# Patient Record
Sex: MALE | Race: WHITE | NOT HISPANIC OR LATINO | URBAN - METROPOLITAN AREA
[De-identification: names, ages, dates, MRNs, and addresses within clinical notes are randomized per-mention and may not be internally consistent; named-entity substitution may affect disease eponyms.]

---

## 2018-05-30 ENCOUNTER — EMERGENCY (EMERGENCY)
Facility: HOSPITAL | Age: 63
LOS: 1 days | Discharge: HOME | End: 2018-05-30
Attending: EMERGENCY MEDICINE | Admitting: EMERGENCY MEDICINE
Payer: COMMERCIAL

## 2018-05-30 VITALS
SYSTOLIC BLOOD PRESSURE: 148 MMHG | HEART RATE: 69 BPM | OXYGEN SATURATION: 96 % | WEIGHT: 229.94 LBS | RESPIRATION RATE: 16 BRPM | HEIGHT: 69 IN | DIASTOLIC BLOOD PRESSURE: 89 MMHG

## 2018-05-30 DIAGNOSIS — Z96.649 PRESENCE OF UNSPECIFIED ARTIFICIAL HIP JOINT: Chronic | ICD-10-CM

## 2018-05-30 DIAGNOSIS — Z98.890 OTHER SPECIFIED POSTPROCEDURAL STATES: Chronic | ICD-10-CM

## 2018-05-30 DIAGNOSIS — Z90.49 ACQUIRED ABSENCE OF OTHER SPECIFIED PARTS OF DIGESTIVE TRACT: Chronic | ICD-10-CM

## 2018-05-30 LAB
ANION GAP SERPL CALC-SCNC: 7 MMOL/L — SIGNIFICANT CHANGE UP (ref 5–17)
APPEARANCE UR: CLEAR — SIGNIFICANT CHANGE UP
BASOPHILS # BLD AUTO: 0.1 K/UL — SIGNIFICANT CHANGE UP (ref 0–0.2)
BASOPHILS NFR BLD AUTO: 0.8 % — SIGNIFICANT CHANGE UP (ref 0–2)
BILIRUB UR-MCNC: NEGATIVE — SIGNIFICANT CHANGE UP
BUN SERPL-MCNC: 23 MG/DL — SIGNIFICANT CHANGE UP (ref 7–23)
CALCIUM SERPL-MCNC: 9.4 MG/DL — SIGNIFICANT CHANGE UP (ref 8.4–10.5)
CHLORIDE SERPL-SCNC: 99 MMOL/L — SIGNIFICANT CHANGE UP (ref 96–108)
CO2 SERPL-SCNC: 26 MMOL/L — SIGNIFICANT CHANGE UP (ref 22–31)
COLOR SPEC: YELLOW — SIGNIFICANT CHANGE UP
CREAT SERPL-MCNC: 0.84 MG/DL — SIGNIFICANT CHANGE UP (ref 0.5–1.3)
DIFF PNL FLD: ABNORMAL
EOSINOPHIL # BLD AUTO: 0 K/UL — SIGNIFICANT CHANGE UP (ref 0–0.5)
EOSINOPHIL NFR BLD AUTO: 0.2 % — SIGNIFICANT CHANGE UP (ref 0–6)
GLUCOSE SERPL-MCNC: 137 MG/DL — HIGH (ref 70–99)
GLUCOSE UR QL: NEGATIVE MG/DL — SIGNIFICANT CHANGE UP
HCT VFR BLD CALC: 43.8 % — SIGNIFICANT CHANGE UP (ref 39–50)
HGB BLD-MCNC: 15.1 G/DL — SIGNIFICANT CHANGE UP (ref 13–17)
KETONES UR-MCNC: NEGATIVE — SIGNIFICANT CHANGE UP
LEUKOCYTE ESTERASE UR-ACNC: NEGATIVE — SIGNIFICANT CHANGE UP
LYMPHOCYTES # BLD AUTO: 1.6 K/UL — SIGNIFICANT CHANGE UP (ref 1–3.3)
LYMPHOCYTES # BLD AUTO: 10.4 % — LOW (ref 13–44)
MCHC RBC-ENTMCNC: 30.7 PG — SIGNIFICANT CHANGE UP (ref 27–34)
MCHC RBC-ENTMCNC: 34.6 GM/DL — SIGNIFICANT CHANGE UP (ref 32–36)
MCV RBC AUTO: 88.8 FL — SIGNIFICANT CHANGE UP (ref 80–100)
MONOCYTES # BLD AUTO: 0.8 K/UL — SIGNIFICANT CHANGE UP (ref 0–0.9)
MONOCYTES NFR BLD AUTO: 4.9 % — SIGNIFICANT CHANGE UP (ref 2–14)
NEUTROPHILS # BLD AUTO: 13.1 K/UL — HIGH (ref 1.8–7.4)
NEUTROPHILS NFR BLD AUTO: 83.7 % — HIGH (ref 43–77)
NITRITE UR-MCNC: NEGATIVE — SIGNIFICANT CHANGE UP
PH UR: 7 — SIGNIFICANT CHANGE UP (ref 5–8)
PLATELET # BLD AUTO: 299 K/UL — SIGNIFICANT CHANGE UP (ref 150–400)
POTASSIUM SERPL-MCNC: 4.2 MMOL/L — SIGNIFICANT CHANGE UP (ref 3.5–5.3)
POTASSIUM SERPL-SCNC: 4.2 MMOL/L — SIGNIFICANT CHANGE UP (ref 3.5–5.3)
PROT UR-MCNC: NEGATIVE MG/DL — SIGNIFICANT CHANGE UP
RBC # BLD: 4.93 M/UL — SIGNIFICANT CHANGE UP (ref 4.2–5.8)
RBC # FLD: 11.6 % — SIGNIFICANT CHANGE UP (ref 10.3–14.5)
SODIUM SERPL-SCNC: 132 MMOL/L — LOW (ref 135–145)
SP GR SPEC: 1.01 — SIGNIFICANT CHANGE UP (ref 1.01–1.02)
UROBILINOGEN FLD QL: NEGATIVE MG/DL — SIGNIFICANT CHANGE UP
WBC # BLD: 15.7 K/UL — HIGH (ref 3.8–10.5)
WBC # FLD AUTO: 15.7 K/UL — HIGH (ref 3.8–10.5)

## 2018-05-30 PROCEDURE — 74176 CT ABD & PELVIS W/O CONTRAST: CPT

## 2018-05-30 PROCEDURE — 99284 EMERGENCY DEPT VISIT MOD MDM: CPT | Mod: 25

## 2018-05-30 PROCEDURE — 85027 COMPLETE CBC AUTOMATED: CPT

## 2018-05-30 PROCEDURE — 96361 HYDRATE IV INFUSION ADD-ON: CPT

## 2018-05-30 PROCEDURE — 96375 TX/PRO/DX INJ NEW DRUG ADDON: CPT

## 2018-05-30 PROCEDURE — 87086 URINE CULTURE/COLONY COUNT: CPT

## 2018-05-30 PROCEDURE — 74176 CT ABD & PELVIS W/O CONTRAST: CPT | Mod: 26

## 2018-05-30 PROCEDURE — 96374 THER/PROPH/DIAG INJ IV PUSH: CPT

## 2018-05-30 PROCEDURE — 81001 URINALYSIS AUTO W/SCOPE: CPT

## 2018-05-30 PROCEDURE — 99285 EMERGENCY DEPT VISIT HI MDM: CPT

## 2018-05-30 PROCEDURE — 80048 BASIC METABOLIC PNL TOTAL CA: CPT

## 2018-05-30 RX ORDER — OXYCODONE AND ACETAMINOPHEN 5; 325 MG/1; MG/1
2 TABLET ORAL
Qty: 24 | Refills: 0
Start: 2018-05-30 | End: 2018-06-01

## 2018-05-30 RX ORDER — SODIUM CHLORIDE 9 MG/ML
1000 INJECTION INTRAMUSCULAR; INTRAVENOUS; SUBCUTANEOUS ONCE
Qty: 0 | Refills: 0 | Status: COMPLETED | OUTPATIENT
Start: 2018-05-30 | End: 2018-05-30

## 2018-05-30 RX ORDER — MORPHINE SULFATE 50 MG/1
4 CAPSULE, EXTENDED RELEASE ORAL ONCE
Qty: 0 | Refills: 0 | Status: DISCONTINUED | OUTPATIENT
Start: 2018-05-30 | End: 2018-05-30

## 2018-05-30 RX ORDER — TAMSULOSIN HYDROCHLORIDE 0.4 MG/1
1 CAPSULE ORAL
Qty: 10 | Refills: 0
Start: 2018-05-30 | End: 2018-06-08

## 2018-05-30 RX ORDER — TAMSULOSIN HYDROCHLORIDE 0.4 MG/1
0.4 CAPSULE ORAL ONCE
Qty: 0 | Refills: 0 | Status: DISCONTINUED | OUTPATIENT
Start: 2018-05-30 | End: 2018-06-03

## 2018-05-30 RX ORDER — ONDANSETRON 8 MG/1
4 TABLET, FILM COATED ORAL ONCE
Qty: 0 | Refills: 0 | Status: COMPLETED | OUTPATIENT
Start: 2018-05-30 | End: 2018-05-30

## 2018-05-30 RX ADMIN — ONDANSETRON 4 MILLIGRAM(S): 8 TABLET, FILM COATED ORAL at 04:53

## 2018-05-30 RX ADMIN — MORPHINE SULFATE 4 MILLIGRAM(S): 50 CAPSULE, EXTENDED RELEASE ORAL at 05:09

## 2018-05-30 RX ADMIN — SODIUM CHLORIDE 1000 MILLILITER(S): 9 INJECTION INTRAMUSCULAR; INTRAVENOUS; SUBCUTANEOUS at 04:52

## 2018-05-30 RX ADMIN — MORPHINE SULFATE 4 MILLIGRAM(S): 50 CAPSULE, EXTENDED RELEASE ORAL at 04:53

## 2018-05-30 NOTE — ED ADULT NURSE NOTE - PSH
H/O ileostomy  reversed 12/2014  H/O laminectomy  x2  H/O resection of rectum    History of hip replacement  right

## 2018-05-30 NOTE — ED ADULT TRIAGE NOTE - BMI (KG/M2)
34 Patient will perform bed mobility skills (supine to sit and sit to supine) with maximum assistance in 2-4 sessions.

## 2018-05-30 NOTE — ED ADULT NURSE NOTE - CAS EDN DISCHARGE ASSESSMENT
Patient baseline mental status/No adverse reaction to first time med in ED/Alert and oriented to person, place and time

## 2018-05-30 NOTE — ED PROVIDER NOTE - OBJECTIVE STATEMENT
62yo male who presents with flank \pain since last nite. 64yo male who presents with flank \pain since last nite.  pt has had pain since last nite, right flank non radiating, no vomiting or fever, pt has been drinking water with little relief. pt has hx of kidney stone last one he passed, pt did not take anything for the pain

## 2018-05-30 NOTE — ED PROVIDER NOTE - PROGRESS NOTE DETAILS
pt reevaluated, feeling better, pain has improved. ct results reviewed with pt, pt to be d/c home follow up with pmd d/c with flomax and percocet, return if any sytmposm wrosen

## 2018-05-31 LAB
CULTURE RESULTS: NO GROWTH — SIGNIFICANT CHANGE UP
SPECIMEN SOURCE: SIGNIFICANT CHANGE UP

## 2020-09-15 ENCOUNTER — TRANSCRIPTION ENCOUNTER (OUTPATIENT)
Age: 65
End: 2020-09-15

## 2022-11-21 PROBLEM — F41.9 ANXIETY DISORDER, UNSPECIFIED: Chronic | Status: ACTIVE | Noted: 2018-05-30

## 2022-11-21 PROBLEM — I10 ESSENTIAL (PRIMARY) HYPERTENSION: Chronic | Status: ACTIVE | Noted: 2018-05-30

## 2022-11-21 PROBLEM — G20 PARKINSON'S DISEASE: Chronic | Status: ACTIVE | Noted: 2018-05-30

## 2022-11-21 PROBLEM — E03.9 HYPOTHYROIDISM, UNSPECIFIED: Chronic | Status: ACTIVE | Noted: 2018-05-30

## 2022-11-23 ENCOUNTER — TRANSCRIPTION ENCOUNTER (OUTPATIENT)
Age: 67
End: 2022-11-23

## 2022-11-23 ENCOUNTER — APPOINTMENT (OUTPATIENT)
Dept: UROLOGY | Facility: CLINIC | Age: 67
End: 2022-11-23

## 2022-11-23 VITALS
HEIGHT: 70 IN | DIASTOLIC BLOOD PRESSURE: 71 MMHG | HEART RATE: 73 BPM | WEIGHT: 270 LBS | BODY MASS INDEX: 38.65 KG/M2 | TEMPERATURE: 97.6 F | SYSTOLIC BLOOD PRESSURE: 165 MMHG

## 2022-11-23 DIAGNOSIS — R31.0 GROSS HEMATURIA: ICD-10-CM

## 2022-11-23 PROCEDURE — 99204 OFFICE O/P NEW MOD 45 MIN: CPT

## 2022-11-23 NOTE — HISTORY OF PRESENT ILLNESS
[FreeTextEntry1] : referred for evaluation of renal masses.\par two weeks ago noted gross hematuria, intermittent but no clots. No flank pian. N/V though some "tightness" on the left ; Has h/o stones in past = nothing like it.\par Hd CT scan - we reviewed together  r.\par Noted a cm mass right kidney - extends into sinus near division of the right renal artery. imaging most c/w RCC.\par Left kidney with hydronephrotic pelvis, almost like a UPJ with hyperdense material on non-contrast, partly enhances . Note  LP blown out with thin parenchyma. Some enlarged nodes left side.\par Ct Chest negative \par never smoked. \par \par h/o removal rectal lesion with covering ileostomy - then hernai repair with mesh \par

## 2022-11-23 NOTE — REVIEW OF SYSTEMS
[Seen by urologist before (Name)  ___] : Preciously seen by a urologist: [unfilled] [Urine Infection (bladder/kidney)] : bladder/kidney infection [Blood in urine that you can see] : blood visible in urine [Wake up at night to urinate  How many times?  ___] : wakes up to urinate [unfilled] times during the night [Joint Pain] : joint pain [Negative] : Integumentary [Limb Weakness] : limb weakness [Difficulty Walking] : difficulty walking [Muscle Weakness] : muscle weakness [FreeTextEntry2] : STONES IN URINE

## 2022-11-23 NOTE — ASSESSMENT
[FreeTextEntry1] : feel he has bilateral separate issues - more concerned on left with the likely urothelial cancer with lymphadenopathy  = plan for ureteroscopy and biopsy JJ stent. Note recent Creatinine 0.9\par if so needs chemotherapy followed by nephroureterectomy and node dissection.\par not so worried with the rght side -  a partial fesible though would do open with ice.

## 2022-11-25 ENCOUNTER — OUTPATIENT (OUTPATIENT)
Dept: OUTPATIENT SERVICES | Facility: HOSPITAL | Age: 67
LOS: 1 days | Discharge: HOME | End: 2022-11-25

## 2022-11-25 DIAGNOSIS — N28.89 OTHER SPECIFIED DISORDERS OF KIDNEY AND URETER: ICD-10-CM

## 2022-11-25 DIAGNOSIS — Z90.49 ACQUIRED ABSENCE OF OTHER SPECIFIED PARTS OF DIGESTIVE TRACT: Chronic | ICD-10-CM

## 2022-11-25 DIAGNOSIS — Z96.649 PRESENCE OF UNSPECIFIED ARTIFICIAL HIP JOINT: Chronic | ICD-10-CM

## 2022-11-25 DIAGNOSIS — Z98.890 OTHER SPECIFIED POSTPROCEDURAL STATES: Chronic | ICD-10-CM

## 2022-11-25 LAB — BACTERIA UR CULT: NORMAL

## 2022-11-25 NOTE — CONSULT LETTER
[Dear  ___] : Dear  [unfilled], [Consult Letter:] : I had the pleasure of evaluating your patient, [unfilled]. [Please see my note below.] : Please see my note below. [Consult Closing:] : Thank you very much for allowing me to participate in the care of this patient.  If you have any questions, please do not hesitate to contact me. [Sincerely,] : Sincerely, [DrJazzmine  ___] : Dr. LOAIZA [DrJazzmine ___] : Dr. LOAIZA

## 2022-11-26 LAB — URINE CYTOLOGY: NORMAL

## 2022-11-28 ENCOUNTER — OUTPATIENT (OUTPATIENT)
Dept: OUTPATIENT SERVICES | Facility: HOSPITAL | Age: 67
LOS: 1 days | End: 2022-11-28
Payer: MEDICARE

## 2022-11-28 ENCOUNTER — NON-APPOINTMENT (OUTPATIENT)
Age: 67
End: 2022-11-28

## 2022-11-28 ENCOUNTER — RESULT REVIEW (OUTPATIENT)
Age: 67
End: 2022-11-28

## 2022-11-28 ENCOUNTER — APPOINTMENT (OUTPATIENT)
Dept: HEMATOLOGY ONCOLOGY | Facility: CLINIC | Age: 67
End: 2022-11-28

## 2022-11-28 VITALS
OXYGEN SATURATION: 96 % | SYSTOLIC BLOOD PRESSURE: 168 MMHG | HEART RATE: 75 BPM | DIASTOLIC BLOOD PRESSURE: 80 MMHG | RESPIRATION RATE: 16 BRPM | TEMPERATURE: 97 F | HEIGHT: 70 IN | BODY MASS INDEX: 39.77 KG/M2 | WEIGHT: 277.78 LBS

## 2022-11-28 DIAGNOSIS — Z80.0 FAMILY HISTORY OF MALIGNANT NEOPLASM OF DIGESTIVE ORGANS: ICD-10-CM

## 2022-11-28 DIAGNOSIS — Z98.890 OTHER SPECIFIED POSTPROCEDURAL STATES: Chronic | ICD-10-CM

## 2022-11-28 DIAGNOSIS — Z96.649 PRESENCE OF UNSPECIFIED ARTIFICIAL HIP JOINT: Chronic | ICD-10-CM

## 2022-11-28 DIAGNOSIS — Z80.51 FAMILY HISTORY OF MALIGNANT NEOPLASM OF KIDNEY: ICD-10-CM

## 2022-11-28 DIAGNOSIS — Z78.9 OTHER SPECIFIED HEALTH STATUS: ICD-10-CM

## 2022-11-28 DIAGNOSIS — Z11.52 ENCOUNTER FOR SCREENING FOR COVID-19: ICD-10-CM

## 2022-11-28 DIAGNOSIS — Z80.3 FAMILY HISTORY OF MALIGNANT NEOPLASM OF BREAST: ICD-10-CM

## 2022-11-28 DIAGNOSIS — Z90.49 ACQUIRED ABSENCE OF OTHER SPECIFIED PARTS OF DIGESTIVE TRACT: Chronic | ICD-10-CM

## 2022-11-28 DIAGNOSIS — I10 ESSENTIAL (PRIMARY) HYPERTENSION: ICD-10-CM

## 2022-11-28 DIAGNOSIS — G20 PARKINSON'S DISEASE: ICD-10-CM

## 2022-11-28 LAB
BASOPHILS # BLD AUTO: 0.06 K/UL — SIGNIFICANT CHANGE UP (ref 0–0.2)
BASOPHILS NFR BLD AUTO: 0.6 % — SIGNIFICANT CHANGE UP (ref 0–2)
EOSINOPHIL # BLD AUTO: 0.12 K/UL — SIGNIFICANT CHANGE UP (ref 0–0.5)
EOSINOPHIL NFR BLD AUTO: 1.2 % — SIGNIFICANT CHANGE UP (ref 0–6)
HCT VFR BLD CALC: 39.7 % — SIGNIFICANT CHANGE UP (ref 39–50)
HGB BLD-MCNC: 12.9 G/DL — LOW (ref 13–17)
IMM GRANULOCYTES NFR BLD AUTO: 0.3 % — SIGNIFICANT CHANGE UP (ref 0–0.9)
LYMPHOCYTES # BLD AUTO: 1.44 K/UL — SIGNIFICANT CHANGE UP (ref 1–3.3)
LYMPHOCYTES # BLD AUTO: 15 % — SIGNIFICANT CHANGE UP (ref 13–44)
MCHC RBC-ENTMCNC: 27 PG — SIGNIFICANT CHANGE UP (ref 27–34)
MCHC RBC-ENTMCNC: 32.5 G/DL — SIGNIFICANT CHANGE UP (ref 32–36)
MCV RBC AUTO: 83.2 FL — SIGNIFICANT CHANGE UP (ref 80–100)
MONOCYTES # BLD AUTO: 0.66 K/UL — SIGNIFICANT CHANGE UP (ref 0–0.9)
MONOCYTES NFR BLD AUTO: 6.9 % — SIGNIFICANT CHANGE UP (ref 2–14)
NEUTROPHILS # BLD AUTO: 7.31 K/UL — SIGNIFICANT CHANGE UP (ref 1.8–7.4)
NEUTROPHILS NFR BLD AUTO: 76 % — SIGNIFICANT CHANGE UP (ref 43–77)
NRBC # BLD: 0 /100 WBCS — SIGNIFICANT CHANGE UP (ref 0–0)
PLATELET # BLD AUTO: 293 K/UL — SIGNIFICANT CHANGE UP (ref 150–400)
RBC # BLD: 4.77 M/UL — SIGNIFICANT CHANGE UP (ref 4.2–5.8)
RBC # FLD: 14.3 % — SIGNIFICANT CHANGE UP (ref 10.3–14.5)
SARS-COV-2 RNA SPEC QL NAA+PROBE: SIGNIFICANT CHANGE UP
WBC # BLD: 9.62 K/UL — SIGNIFICANT CHANGE UP (ref 3.8–10.5)
WBC # FLD AUTO: 9.62 K/UL — SIGNIFICANT CHANGE UP (ref 3.8–10.5)

## 2022-11-28 PROCEDURE — U0005: CPT

## 2022-11-28 PROCEDURE — C9803: CPT

## 2022-11-28 PROCEDURE — 99205 OFFICE O/P NEW HI 60 MIN: CPT

## 2022-11-28 PROCEDURE — U0003: CPT

## 2022-11-28 RX ORDER — TAMSULOSIN HCL 0.4 MG
0.4 CAPSULE ORAL
Refills: 0 | Status: ACTIVE | COMMUNITY

## 2022-11-28 RX ORDER — LOSARTAN POTASSIUM 100 MG/1
100 TABLET, FILM COATED ORAL
Refills: 0 | Status: ACTIVE | COMMUNITY

## 2022-11-28 RX ORDER — LEVOTHYROXINE SODIUM 0.05 MG/1
50 TABLET ORAL
Refills: 0 | Status: ACTIVE | COMMUNITY

## 2022-11-28 RX ORDER — METOPROLOL SUCCINATE 50 MG/1
50 TABLET, EXTENDED RELEASE ORAL
Refills: 0 | Status: ACTIVE | COMMUNITY

## 2022-11-28 RX ORDER — ATORVASTATIN CALCIUM 20 MG/1
20 TABLET, FILM COATED ORAL
Refills: 0 | Status: ACTIVE | COMMUNITY

## 2022-11-28 RX ORDER — FUROSEMIDE 20 MG/1
20 TABLET ORAL
Refills: 0 | Status: ACTIVE | COMMUNITY

## 2022-11-28 RX ORDER — AMLODIPINE BESYLATE 10 MG/1
10 TABLET ORAL
Refills: 0 | Status: ACTIVE | COMMUNITY

## 2022-11-28 RX ORDER — CARBIDOPA AND LEVODOPA 25; 250 MG/1; MG/1
25-250 TABLET ORAL
Refills: 0 | Status: ACTIVE | COMMUNITY

## 2022-11-28 NOTE — ASSESSMENT
[Palliative Care Plan] : not applicable at this time [FreeTextEntry1] : Regis Naranjo is seen in the office today in consultation, accompanied by his wife.  He is referred for bilateral renal masses.  2 weeks prior to his diagnosis, he experienced intermittent gross hematuria.  There were no clots present.  He had no flank pain.  He did note some tightness on the left side, and he says is not clear if this was secondary to any issue or due to associated symptoms with his Parkinson's.  He has a history of renal stones in the past, and a CT stone hunt was done as an outpatient.  He then underwent a contrast CT urogram.  A mass in the right kidney extends into the sinus near the division of the right renal artery.  This was considered to be most compatible with a renal cell carcinoma.  The left kidney revealed a hydronephrotic pelvis.  There were enlarged nodes in the left side.  The mass on the left side has the appearance of transitional cell carcinoma.\par \par He was seen by Dr. Hernandez in November 23, and he agrees that the left side likely represents urothelial carcinoma with the associated lymphadenopathy.  He is planned to have a ureteroscopy and stent placement on December 1.  His creatinine was recently 0.9.  He is referred for consideration of the possibility of neoadjuvant chemotherapy.  Dr. Hernandez spoke with me before the visit and felt that the right side kidney mass might be amenable to a partial nephrectomy.\par \par He was initially evaluated by Dr. Sean Stroud.  He is being followed for an elevated PSA.  He had a recent Enterococcus faecalis urinary tract infection.  An office cystoscopy was performed after the CT scan, as the bladder wall was not well visualized due to his prior hip replacement.  There were no findings in the bladder.  A CT scan of the chest reports that there was stable lower chest findings include mildly enlarged lower mediastinal lymph nodes and a tiny lung base nodule.  This was stable from a CT scan more than 2 years ago.  There is new para-aortic lymphadenopathy compared to the prior scan.  A left periaortic lymph node at the level of the renal vasculature measured 2.1 x 1.5 cm.  An anterior periaortic lymph node measured the same size as well.  Overall, he states that he feels "fine".  He has some minor aches and pains.  He has been present for a long time.  He has some difficulty walking and he cannot walk for a long distance.  Standing in 1 spot is also an issue.  He was diagnosed with Parkinson's in 2010.  He had deep brain stimulators placed 3 years ago.  He says that this is helped with the symptoms considerably.  There were no headaches.  There is no dizziness.  He does have some balance issues.  He has not fallen recently.  He did fall once in the summer trying to get out of the pool.  His appetite has been normal and there is no weight loss.  He notes some occasional swelling of his ankles.  There is no history of DVT.  There is no chest pain chest pressure or palpitations.  He denies any cough or shortness of breath.  Denies any fatigue.  He works on a daily basis.  He goes to physical therapy once a week and has home physical therapy once a week in addition.  He rides a stationary bike a few times a week.  He has no previous history of hematuria.\par \par A comprehensive history was obtained and a physical examination was performed.  His performance status is 1.  There is no palpable adenopathy.  The lungs are clear and the heart examination is normal.  There was trace ankle edema.  There is a scar on the right side of the abdomen where the previous ileostomy was present.  There is no palpable masses or tenderness present.  There is no CVA tenderness.  Neurologically, there is no cogwheeling.  Knee jerk reflexes are normal.  Lower extremity motor power is 4/5 at the pelvic girdle bilaterally.  His tremor is most prominent in the distal right arm.\par \par The imaging reports were reviewed, and the imaging was available for review, loaded onto our PACS system.  I personally reviewed the studies.\par \par Today's chemistry panel was normal.  The BUN was 17 with a creatinine of 0.87.  Magnesium level was 1.8.  The LDH was 197.  The hemoglobin was slightly low at 12.9 g.  The white blood cell count and platelet count were normal.  The differential was normal.\par \par He will have a biopsy performed on December 1.  I discussed how we must be able to tell that this is invading, and sometimes a ureteroscopic biopsy cannot identify that.  If it does not show invasion, he may need to have a percutaneous biopsy of the kidney mass on the left side which is considered to be urothelial carcinoma.\par \par We discussed the role of neoadjuvant chemotherapy.  In the original trials, upper tract disease was not permitted.  This data was published in 2003 and showed a significant survival benefit for those patients who received chemotherapy for bladder cancer as opposed to surgery alone.  If he was not eligible for neoadjuvant chemotherapy, he would have his left kidney excised.  We discussed the clinical behavior of urothelial carcinoma and as well as the staging.\par \par In terms of his right sided mass, this appears to be a renal cell carcinoma and the patient explained to me that I might give him neoadjuvant immunotherapy prior to removal of the tumor.  I have not had this discussion with Dr. Dooley and I will need to meet with him about this.  This case is probably best discussed in multidisciplinary tumor board.\par \par We discussed the role of cisplatin and gemcitabine as neoadjuvant treatment.  I do not think that he would be a good candidate for dose dense MVAC.  Recent publication from cooperative group in [place] was published in the Journal of clinical oncology recently.  This is known as the VESPER trial.  500 patients were randomly assigned to 28 [facility] centers and received either 6 cycles of dose dense MVAC or 4 cycles of gemcitabine and cisplatin once every 3 weeks before surgery.  The primary endpoint reported in this trial was progression free survival at 3 years.  Secondary endpoints were time to progression and overall survival.  Of interest, they can find study participants that those less than 70 years of age out of concern for the role of MVAC in these patients.  Of note, this trial was for only bladder cancer and not upper tract disease.\par \par His tumor will have to be assessed for genomic abnormalities such as tumor mutational burden and EGFR mutations.  The latter are more prominent in upper tract disease.\par \par We discussed the adverse effects that may occur with cisplatin and gemcitabine.  He was given written information in regards to these 2 drugs.  Written informed consent was obtained.  It is unclear whether he will require an [id number] at this time.  All questions were answered to the best my ability and to their apparent satisfaction.  We will await the results of the biopsy and then make plans to initiate therapy if the biopsy is adequate.

## 2022-11-28 NOTE — PHYSICAL EXAM
[Restricted in physically strenuous activity but ambulatory and able to carry out work of a light or sedentary nature] : Status 1- Restricted in physically strenuous activity but ambulatory and able to carry out work of a light or sedentary nature, e.g., light house work, office work [Obese] : obese [Normal] : affect appropriate [de-identified] : trace ankle edema [de-identified] : scar right side [de-identified] : no CVAT [de-identified] : no cogwheeling, KJ reflexes normal, LE motor power 4/5 bilaterally, tremor most prominent in distal right arm

## 2022-11-28 NOTE — REVIEW OF SYSTEMS
[Lower Ext Edema] : lower extremity edema [Joint Pain] : joint pain [Difficulty Walking] : difficulty walking [Muscle Weakness] : muscle weakness [Negative] : Gastrointestinal [Fever] : no fever [Chills] : no chills [Fatigue] : no fatigue [Recent Change In Weight] : ~T no recent weight change [Dysphagia] : no dysphagia [Odynophagia] : no odynophagia [Chest Pain] : no chest pain [Palpitations] : no palpitations [Wheezing] : no wheezing [Cough] : no cough [SOB on Exertion] : no shortness of breath during exertion [Dysuria] : no dysuria [Incontinence] : no incontinence [Muscle Pain] : no muscle pain [Skin Rash] : no skin rash [Dizziness] : no dizziness [Anxiety] : no anxiety [Depression] : no depression [Easy Bleeding] : no tendency for easy bleeding [Easy Bruising] : no tendency for easy bruising [FreeTextEntry3] : wears glasses [FreeTextEntry8] : nocturia x 2-3, no urgency [FreeTextEntry9] : many joints no cramps  [de-identified] : No HA, occ balance issues.

## 2022-11-28 NOTE — HISTORY OF PRESENT ILLNESS
[de-identified] : Regis Mcintosh is seen in consultation on November 28, 2022.  He was referred for evaluation of bilateral renal masses.  2 weeks prior, he was experiencing gross hematuria which was intermittent.  There were no clots.  He had no flank pain.  He did notes some "tightness" on the left side, ? to Parkinson's?.  He has a history of stones in the past.  CT was done as an a outpatient at [facility].  A mass on the right kidney extends into the sinus near the division of the right renal artery.  The imaging was most compatible with renal cell carcinoma.  The left kidney revealed a hydronephrotic pelvis.  There was hyperdense material on the noncontrast CT scan, and it partially enhances with contrast.  There were some enlarged nodes on the left side.  A CT scan of the chest was negative.  He was seen by Dr. Hernandez on November 23, and he was concerned that the left likely represents urothelial carcinoma with the associated lymphadenopathy.  The plan is for ureteroscopy and biopsy with a JJ stent, scheduled for December 1, 2022 .  His creatinine was 0.9.  He is referred for consideration of the possibility of neoadjuvant chemotherapy.  I spoke with Dr. Hernandez after the patient was evaluated by him.  He feels that the right side kidney mass might be amenable to a partial nephrectomy.\par \par He was initially evaluated by Dr. Sean Jurado.  He had a PSA elevation in September 2020 at 4.76.  In 2018 his PSA was 1.9.  In November 2021, his PSA was 3.88.  In mid November he had an Enterococcus faecalis urinary tract infection and was treated.  A CT urogram was performed revealing bilateral renal masses concerning for malignant etiologies.  The right renal mass measured up to 7.1 cm and was concerning for renal cell carcinoma.  It was endophytic.  There were subtle features present raising concern for possible early vascular and urinary collecting system invasion.  The left renal pelvis mass measured 7.2 cm and had the radiologic features of urothelial carcinoma.  The mass was enhancing.  Retroperitoneal adenopathy was present.  The bladder was partly obscured due to right hip arthroplasty artifact.  No definite bladder lesion was identified.  There was a nonobstructing 0.5 cm right sided ureteral stone located distally.  The report says that there was stable lower chest findings including mildly enlarged lower mediastinal lymph node and a tiny lung base nodule.  These findings were stable from a CT scan 2 years ago.\par \par There was new para-aortic lymphadenopathy compared to the prior scan from 2 years ago.  There is a left para-aortic lymph node at the level of the renal vasculature measuring 2.1 x 1.5 cm.  An anterior periaortic lymph node at the level of the LEONEL was 2.1 x 1.5 cm.\par Cystoscopy was performed on November 21 by Dr. Jurado.  Urethra was normal.  The prostatic urethra demonstrated obstruction due to lateral lobe enlargement of the prostate.  The bladder was normal.  Both ureteral orifices revealed clear E flux of urine.  No tumors were noted within the bladder.\par \par Overall, he feels "fine". He has minor aches and pains, present for a long time. He has some difficulty walking, can't do long distance, standing in one spot is an issue, diagnosed with the Parkinson's in 2010. Had DBS placed 3 years ago. He says that helped his symptoms a lot. NO headaches, no dizziness, some balance issues. No falls except once this summer, while trying to get out of the pool. Appetite has been normal, no weight loss. Occ swelling of the legs, no h/o DVT. No chest pain/pressure/palpitations. No cough, no SALDANA. Denies fatigue. Works daily, goes to PT once weekly, and home PT once a week in addition. Rides stationary bike a few times a week. No prior h/o hematuria.

## 2022-11-29 ENCOUNTER — NON-APPOINTMENT (OUTPATIENT)
Age: 67
End: 2022-11-29

## 2022-11-29 ENCOUNTER — OUTPATIENT (OUTPATIENT)
Dept: OUTPATIENT SERVICES | Facility: HOSPITAL | Age: 67
LOS: 1 days | End: 2022-11-29
Payer: MEDICARE

## 2022-11-29 VITALS
TEMPERATURE: 99 F | SYSTOLIC BLOOD PRESSURE: 156 MMHG | RESPIRATION RATE: 18 BRPM | DIASTOLIC BLOOD PRESSURE: 82 MMHG | OXYGEN SATURATION: 95 % | WEIGHT: 276.02 LBS | HEIGHT: 69 IN | HEART RATE: 72 BPM

## 2022-11-29 DIAGNOSIS — Z96.649 PRESENCE OF UNSPECIFIED ARTIFICIAL HIP JOINT: Chronic | ICD-10-CM

## 2022-11-29 DIAGNOSIS — Z90.49 ACQUIRED ABSENCE OF OTHER SPECIFIED PARTS OF DIGESTIVE TRACT: Chronic | ICD-10-CM

## 2022-11-29 DIAGNOSIS — N28.89 OTHER SPECIFIED DISORDERS OF KIDNEY AND URETER: ICD-10-CM

## 2022-11-29 DIAGNOSIS — Z98.890 OTHER SPECIFIED POSTPROCEDURAL STATES: Chronic | ICD-10-CM

## 2022-11-29 DIAGNOSIS — Z01.818 ENCOUNTER FOR OTHER PREPROCEDURAL EXAMINATION: ICD-10-CM

## 2022-11-29 LAB
ALBUMIN SERPL ELPH-MCNC: 4.2 G/DL
ALP BLD-CCNC: 80 U/L
ALT SERPL-CCNC: 7 U/L
ANION GAP SERPL CALC-SCNC: 12 MMOL/L
APTT BLD: 33 SEC
AST SERPL-CCNC: 19 U/L
BILIRUB SERPL-MCNC: 0.4 MG/DL
BUN SERPL-MCNC: 17 MG/DL
CALCIUM SERPL-MCNC: 9.7 MG/DL
CHLORIDE SERPL-SCNC: 104 MMOL/L
CO2 SERPL-SCNC: 24 MMOL/L
CREAT SERPL-MCNC: 0.87 MG/DL
EGFR: 95 ML/MIN/1.73M2
GLUCOSE SERPL-MCNC: 122 MG/DL
INR PPP: 1.09 RATIO
LDH SERPL-CCNC: 197 U/L
MAGNESIUM SERPL-MCNC: 1.8 MG/DL
POTASSIUM SERPL-SCNC: 3.7 MMOL/L
PROT SERPL-MCNC: 7.7 G/DL
PT BLD: 12.6 SEC
SODIUM SERPL-SCNC: 141 MMOL/L

## 2022-11-29 RX ORDER — ESCITALOPRAM OXALATE 10 MG/1
1 TABLET, FILM COATED ORAL
Qty: 0 | Refills: 0 | DISCHARGE

## 2022-11-29 RX ORDER — CARBIDOPA AND LEVODOPA 25; 100 MG/1; MG/1
2 TABLET ORAL
Qty: 0 | Refills: 0 | DISCHARGE

## 2022-11-29 RX ORDER — METOPROLOL TARTRATE 50 MG
1 TABLET ORAL
Qty: 0 | Refills: 0 | DISCHARGE

## 2022-11-29 RX ORDER — AMLODIPINE AND VALSARTAN 5; 320 MG/1; MG/1
1 TABLET, FILM COATED ORAL
Qty: 0 | Refills: 0 | DISCHARGE

## 2022-11-29 NOTE — H&P PST ADULT - HISTORY OF PRESENT ILLNESS
This is a 68 y/o male PMH hypertension, Parkinson's disease, S/P DBS 4 years ago, possible renal calculi with spontaneous passage, began having hematuria 2 weeks and was found to have a left kidney mass.  Presents today for left flexible ureteroscopy, biopsy of tumor, JJ stent placement.    COVID 5/2022, self treated at home, COVID swab negative 11/28/22 This is a 66 y/o male PMH hypertension, Parkinson's disease, S/P DBS 4 years ago, possible BALJINDER based on Stop bang criteria, renal calculi with spontaneous passage, began having hematuria 2 weeks which he thought was due to renal calculus and was found to have a left kidney mass.  Presents today for left flexible ureteroscopy, biopsy of tumor, JJ stent placement.    COVID 5/2022, self treated at home, COVID swab negative 11/28/22 This is a 68 y/o male PMH hypertension, non-cancerous rectal mass, S/P resection of rectum and ileostomy, with reversal, Parkinson's disease, S/P DBS 4 years ago, possible BALJINDER based on Stop bang score of 6, renal calculi with spontaneous passage, began having hematuria 2 weeks which he thought was due to renal calculus and was found to have bilateral kidney masses, left side is consistent with urothelial carcinoma and right side is consistent with renal cell carcinoma.  Presents today for left flexible ureteroscopy, biopsy of tumor, JJ stent placement, plan for possible right partial nephrectomy at a later date.  The patient plans to have mediport placed for chemotherapy at a future date.    COVID 5/2022, self treated at home, COVID swab negative 11/28/22

## 2022-11-29 NOTE — H&P PST ADULT - NSICDXPASTMEDICALHX_GEN_ALL_CORE_FT
PAST MEDICAL HISTORY:  Anxiety     Hypertension     Hypothyroid     Parkinson disease      PAST MEDICAL HISTORY:  Anxiety     Edema leg     Hypertension     Hypothyroid     BALJINDER (obstructive sleep apnea) based on stop bang score of 5    Osteoarthritis     Parkinson disease     Rectal mass      PAST MEDICAL HISTORY:  Anxiety     Edema leg     Hypertension     Hypothyroid     BALJINDER (obstructive sleep apnea) based on stop bang score of 6    Osteoarthritis     Parkinson disease     Rectal mass

## 2022-11-29 NOTE — H&P PST ADULT - NSICDXPASTSURGICALHX_GEN_ALL_CORE_FT
PAST SURGICAL HISTORY:  H/O ileostomy reversed 12/2014    H/O laminectomy lumbar x2    H/O resection of rectum large mass, was non-cancerous    History of hip replacement right    S/P hernia repair incisional

## 2022-11-29 NOTE — H&P PST ADULT - CARDIOVASCULAR
negative normal/regular rate and rhythm/S1 S2 present/no gallops/no rub/no murmur normal/regular rate and rhythm/S1 S2 present/no gallops/no rub/murmur

## 2022-11-29 NOTE — H&P PST ADULT - ANESTHESIA, PREVIOUS REACTION, PROFILE
reports requiring additional O2 when coming out of anesthesia due to "lower numbers" but comes right back up with deep breaths

## 2022-11-30 ENCOUNTER — TRANSCRIPTION ENCOUNTER (OUTPATIENT)
Age: 67
End: 2022-11-30

## 2022-11-30 LAB
CULTURE RESULTS: SIGNIFICANT CHANGE UP
SPECIMEN SOURCE: SIGNIFICANT CHANGE UP

## 2022-12-01 ENCOUNTER — OUTPATIENT (OUTPATIENT)
Dept: OUTPATIENT SERVICES | Facility: HOSPITAL | Age: 67
LOS: 1 days | End: 2022-12-01
Payer: MEDICARE

## 2022-12-01 ENCOUNTER — RESULT REVIEW (OUTPATIENT)
Age: 67
End: 2022-12-01

## 2022-12-01 ENCOUNTER — APPOINTMENT (OUTPATIENT)
Dept: UROLOGY | Facility: HOSPITAL | Age: 67
End: 2022-12-01

## 2022-12-01 ENCOUNTER — TRANSCRIPTION ENCOUNTER (OUTPATIENT)
Age: 67
End: 2022-12-01

## 2022-12-01 VITALS
HEART RATE: 71 BPM | HEIGHT: 69 IN | OXYGEN SATURATION: 96 % | DIASTOLIC BLOOD PRESSURE: 87 MMHG | WEIGHT: 276.02 LBS | RESPIRATION RATE: 18 BRPM | SYSTOLIC BLOOD PRESSURE: 138 MMHG | TEMPERATURE: 98 F

## 2022-12-01 VITALS
SYSTOLIC BLOOD PRESSURE: 123 MMHG | OXYGEN SATURATION: 95 % | RESPIRATION RATE: 16 BRPM | DIASTOLIC BLOOD PRESSURE: 60 MMHG | HEART RATE: 59 BPM

## 2022-12-01 DIAGNOSIS — N28.89 OTHER SPECIFIED DISORDERS OF KIDNEY AND URETER: ICD-10-CM

## 2022-12-01 DIAGNOSIS — Z96.649 PRESENCE OF UNSPECIFIED ARTIFICIAL HIP JOINT: Chronic | ICD-10-CM

## 2022-12-01 DIAGNOSIS — Z98.890 OTHER SPECIFIED POSTPROCEDURAL STATES: Chronic | ICD-10-CM

## 2022-12-01 DIAGNOSIS — Z90.49 ACQUIRED ABSENCE OF OTHER SPECIFIED PARTS OF DIGESTIVE TRACT: Chronic | ICD-10-CM

## 2022-12-01 PROCEDURE — G0463: CPT

## 2022-12-01 PROCEDURE — 88305 TISSUE EXAM BY PATHOLOGIST: CPT

## 2022-12-01 PROCEDURE — 74420 UROGRAPHY RTRGR +-KUB: CPT

## 2022-12-01 PROCEDURE — 76000 FLUOROSCOPY <1 HR PHYS/QHP: CPT

## 2022-12-01 PROCEDURE — 88305 TISSUE EXAM BY PATHOLOGIST: CPT | Mod: 26

## 2022-12-01 PROCEDURE — 52354 CYSTOURETERO W/BIOPSY: CPT | Mod: LT

## 2022-12-01 PROCEDURE — 88341 IMHCHEM/IMCYTCHM EA ADD ANTB: CPT

## 2022-12-01 PROCEDURE — 88112 CYTOPATH CELL ENHANCE TECH: CPT

## 2022-12-01 PROCEDURE — 52332 CYSTOSCOPY AND TREATMENT: CPT | Mod: LT

## 2022-12-01 PROCEDURE — 88341 IMHCHEM/IMCYTCHM EA ADD ANTB: CPT | Mod: 26,59

## 2022-12-01 PROCEDURE — 88112 CYTOPATH CELL ENHANCE TECH: CPT | Mod: 26

## 2022-12-01 PROCEDURE — C1769: CPT

## 2022-12-01 PROCEDURE — C2625: CPT

## 2022-12-01 PROCEDURE — C1894: CPT

## 2022-12-01 PROCEDURE — 88342 IMHCHEM/IMCYTCHM 1ST ANTB: CPT

## 2022-12-01 PROCEDURE — 87086 URINE CULTURE/COLONY COUNT: CPT

## 2022-12-01 PROCEDURE — 88342 IMHCHEM/IMCYTCHM 1ST ANTB: CPT | Mod: 26

## 2022-12-01 PROCEDURE — C9399: CPT

## 2022-12-01 PROCEDURE — C1758: CPT

## 2022-12-01 DEVICE — GUIDEWIRE SENSOR DUAL-FLEX NITINOL STRAIGHT .035" X 150CM: Type: IMPLANTABLE DEVICE | Site: LEFT | Status: FUNCTIONAL

## 2022-12-01 DEVICE — URETERAL CATH OPEN END 5FR 70CM: Type: IMPLANTABLE DEVICE | Site: LEFT | Status: FUNCTIONAL

## 2022-12-01 DEVICE — URETERAL ACCESS SHEATH 10FR 12-16FR 35CM: Type: IMPLANTABLE DEVICE | Site: LEFT | Status: FUNCTIONAL

## 2022-12-01 DEVICE — STENT URET 7FR 26CM: Type: IMPLANTABLE DEVICE | Site: LEFT | Status: FUNCTIONAL

## 2022-12-01 RX ORDER — SODIUM CHLORIDE 9 MG/ML
1000 INJECTION, SOLUTION INTRAVENOUS
Refills: 0 | Status: DISCONTINUED | OUTPATIENT
Start: 2022-12-01 | End: 2022-12-15

## 2022-12-01 RX ORDER — PHENAZOPYRIDINE HCL 100 MG
1 TABLET ORAL
Qty: 9 | Refills: 0
Start: 2022-12-01 | End: 2022-12-03

## 2022-12-01 RX ORDER — CEFAZOLIN SODIUM 1 G
3000 VIAL (EA) INJECTION ONCE
Refills: 0 | Status: COMPLETED | OUTPATIENT
Start: 2022-12-01 | End: 2022-12-01

## 2022-12-01 RX ORDER — OXYCODONE HYDROCHLORIDE 5 MG/1
5 TABLET ORAL ONCE
Refills: 0 | Status: DISCONTINUED | OUTPATIENT
Start: 2022-12-01 | End: 2022-12-01

## 2022-12-01 RX ORDER — ONDANSETRON 8 MG/1
4 TABLET, FILM COATED ORAL ONCE
Refills: 0 | Status: DISCONTINUED | OUTPATIENT
Start: 2022-12-01 | End: 2022-12-01

## 2022-12-01 RX ORDER — LIDOCAINE HCL 20 MG/ML
0.2 VIAL (ML) INJECTION ONCE
Refills: 0 | Status: DISCONTINUED | OUTPATIENT
Start: 2022-12-01 | End: 2022-12-01

## 2022-12-01 RX ORDER — HYDROMORPHONE HYDROCHLORIDE 2 MG/ML
0.25 INJECTION INTRAMUSCULAR; INTRAVENOUS; SUBCUTANEOUS
Refills: 0 | Status: DISCONTINUED | OUTPATIENT
Start: 2022-12-01 | End: 2022-12-01

## 2022-12-01 RX ORDER — SODIUM CHLORIDE 9 MG/ML
3 INJECTION INTRAMUSCULAR; INTRAVENOUS; SUBCUTANEOUS EVERY 8 HOURS
Refills: 0 | Status: DISCONTINUED | OUTPATIENT
Start: 2022-12-01 | End: 2022-12-01

## 2022-12-01 NOTE — ASU DISCHARGE PLAN (ADULT/PEDIATRIC) - ASU DC SPECIAL INSTRUCTIONSFT
It is common to have blood in the urine after your procedure.  It may be pink or even red; inform your doctor if you have a significant amount of clots in the urine or if you are unable to void at all.  Be sure to drink plenty of fluids at all times.    -It is not uncommon to have some burning when you urinate.  -You have an internal stent (a hollow tube that runs from the kidney to your bladder) after your procedure, helping your kidney drain down to your bladder after your surgery.  Some patients do not notice that they have a stent, while others complain of the sensation of needing to urinate frequently, burning on urination, or even some back pain (especially when they go to urinate). These sensations usually improve gradually, some faster than others. This is not uncommon, but may initially warrant the use of the pain medication which you were prescribed.  While the stent is in place, your urine may continue to be bloody. This stent is temporary and must be removed by your urologist as an outpatient.  -Provided that you are not restricted with fluids by your physician, you should drink 6-8 (8 oz.) glasses of fluid per day.  -You may resume your regular diet and regular medication regimen.    -You may shower or bathe.    -You may take over the counter pain medications such as Tylenol, do not exceed 4 grams daily.  If you have severe pain that does not improve with the pain medication or you have persistent vomiting, call your doctor.  -As you have just underwent general anesthesia, you should refrain from driving, heavy lifting, smoking, alcohol consumption, or important decision making for the next 24 hours.  You may climb stairs and you may resume sexual activity.  -Call your physician if you have a fever over 101F.  -Make a follow up appointment for with your urologist when you arrive home (or the next business day).  -Call your urologist during normal business hours with any other routine questions.   -Dr. Hernandez will call you with the results of your pathology.

## 2022-12-01 NOTE — ASU DISCHARGE PLAN (ADULT/PEDIATRIC) - CARE PROVIDER_API CALL
Nicholas Hernandez  Urology  [address] [place] [address]  Phone: [phone]  Fax: [phone]  Follow Up Time:

## 2022-12-01 NOTE — ASU PATIENT PROFILE, ADULT - FALL HARM RISK - HARM RISK INTERVENTIONS

## 2022-12-01 NOTE — ASU PATIENT PROFILE, ADULT - NSICDXPASTMEDICALHX_GEN_ALL_CORE_FT
PAST MEDICAL HISTORY:  Anxiety     Edema leg     Hypertension     Hypothyroid     BALJINDER (obstructive sleep apnea) based on stop bang score of 6    Osteoarthritis     Parkinson disease     Rectal mass

## 2022-12-01 NOTE — ASU DISCHARGE PLAN (ADULT/PEDIATRIC) - NS MD DC FALL RISK RISK
For information on Fall & Injury Prevention, visit: [email or web address] OR  [email or web address] OR  https://www.cdc.gov/steadi/patient.html

## 2022-12-01 NOTE — BRIEF OPERATIVE NOTE - NSICDXBRIEFPROCEDURE_GEN_ALL_CORE_FT
PROCEDURES:  Cystoscopy, with any combination if indicated of ureteroscopy, pyeloscopy, biopsy, lesion fulguration, and stent insertion 01-Dec-2022 11:33:17  Genaro Moreno

## 2022-12-06 ENCOUNTER — NON-APPOINTMENT (OUTPATIENT)
Age: 67
End: 2022-12-06

## 2022-12-06 DIAGNOSIS — R35.1 NOCTURIA: ICD-10-CM

## 2022-12-07 PROBLEM — G47.33 OBSTRUCTIVE SLEEP APNEA (ADULT) (PEDIATRIC): Chronic | Status: ACTIVE | Noted: 2022-11-29

## 2022-12-07 PROBLEM — R60.0 LOCALIZED EDEMA: Chronic | Status: ACTIVE | Noted: 2022-11-29

## 2022-12-07 PROBLEM — M19.90 UNSPECIFIED OSTEOARTHRITIS, UNSPECIFIED SITE: Chronic | Status: ACTIVE | Noted: 2022-11-29

## 2022-12-07 PROBLEM — K62.89 OTHER SPECIFIED DISEASES OF ANUS AND RECTUM: Chronic | Status: ACTIVE | Noted: 2022-11-29

## 2022-12-08 LAB
BACTERIA UR CULT: NORMAL
SURGICAL PATHOLOGY STUDY: SIGNIFICANT CHANGE UP

## 2022-12-09 LAB
APPEARANCE: ABNORMAL
BACTERIA: ABNORMAL
BILIRUBIN URINE: ABNORMAL
BLOOD URINE: ABNORMAL
COLOR: ABNORMAL
GLUCOSE QUALITATIVE U: NEGATIVE
HYALINE CASTS: 0 /LPF
KETONES URINE: NEGATIVE
LEUKOCYTE ESTERASE URINE: ABNORMAL
MICROSCOPIC-UA: NORMAL
NITRITE URINE: POSITIVE
NON-GYNECOLOGICAL CYTOLOGY STUDY: SIGNIFICANT CHANGE UP
PH URINE: 6
PROTEIN URINE: ABNORMAL
RED BLOOD CELLS URINE: >720 /HPF
SPECIFIC GRAVITY URINE: 1.01
SQUAMOUS EPITHELIAL CELLS: 1 /HPF
UROBILINOGEN URINE: NORMAL
WHITE BLOOD CELLS URINE: >720 /HPF

## 2022-12-09 RX ORDER — METOCLOPRAMIDE 10 MG/1
10 TABLET ORAL EVERY 6 HOURS
Qty: 30 | Refills: 1 | Status: ACTIVE | COMMUNITY
Start: 2022-12-09 | End: 1900-01-01

## 2022-12-11 ENCOUNTER — OUTPATIENT (OUTPATIENT)
Dept: OUTPATIENT SERVICES | Facility: HOSPITAL | Age: 67
LOS: 1 days | End: 2022-12-11
Payer: MEDICARE

## 2022-12-11 DIAGNOSIS — Z98.890 OTHER SPECIFIED POSTPROCEDURAL STATES: Chronic | ICD-10-CM

## 2022-12-11 DIAGNOSIS — Z96.649 PRESENCE OF UNSPECIFIED ARTIFICIAL HIP JOINT: Chronic | ICD-10-CM

## 2022-12-11 DIAGNOSIS — Z11.52 ENCOUNTER FOR SCREENING FOR COVID-19: ICD-10-CM

## 2022-12-11 DIAGNOSIS — Z90.49 ACQUIRED ABSENCE OF OTHER SPECIFIED PARTS OF DIGESTIVE TRACT: Chronic | ICD-10-CM

## 2022-12-11 LAB — SARS-COV-2 RNA SPEC QL NAA+PROBE: SIGNIFICANT CHANGE UP

## 2022-12-11 PROCEDURE — U0005: CPT

## 2022-12-11 PROCEDURE — C9803: CPT

## 2022-12-11 PROCEDURE — U0003: CPT

## 2022-12-12 ENCOUNTER — RESULT REVIEW (OUTPATIENT)
Age: 67
End: 2022-12-12

## 2022-12-12 ENCOUNTER — TRANSCRIPTION ENCOUNTER (OUTPATIENT)
Age: 67
End: 2022-12-12

## 2022-12-12 ENCOUNTER — OUTPATIENT (OUTPATIENT)
Dept: OUTPATIENT SERVICES | Facility: HOSPITAL | Age: 67
LOS: 1 days | End: 2022-12-12
Payer: MEDICARE

## 2022-12-12 VITALS
RESPIRATION RATE: 18 BRPM | WEIGHT: 268.96 LBS | HEIGHT: 69 IN | SYSTOLIC BLOOD PRESSURE: 137 MMHG | HEART RATE: 80 BPM | DIASTOLIC BLOOD PRESSURE: 70 MMHG | TEMPERATURE: 98 F | OXYGEN SATURATION: 99 %

## 2022-12-12 VITALS
SYSTOLIC BLOOD PRESSURE: 122 MMHG | DIASTOLIC BLOOD PRESSURE: 67 MMHG | HEART RATE: 62 BPM | OXYGEN SATURATION: 95 % | RESPIRATION RATE: 17 BRPM

## 2022-12-12 DIAGNOSIS — Z90.49 ACQUIRED ABSENCE OF OTHER SPECIFIED PARTS OF DIGESTIVE TRACT: Chronic | ICD-10-CM

## 2022-12-12 DIAGNOSIS — Z98.890 OTHER SPECIFIED POSTPROCEDURAL STATES: Chronic | ICD-10-CM

## 2022-12-12 DIAGNOSIS — C64.9 MALIGNANT NEOPLASM OF UNSPECIFIED KIDNEY, EXCEPT RENAL PELVIS: ICD-10-CM

## 2022-12-12 DIAGNOSIS — Z96.649 PRESENCE OF UNSPECIFIED ARTIFICIAL HIP JOINT: Chronic | ICD-10-CM

## 2022-12-12 PROCEDURE — 76937 US GUIDE VASCULAR ACCESS: CPT | Mod: 26

## 2022-12-12 PROCEDURE — 36561 INSERT TUNNELED CV CATH: CPT

## 2022-12-12 PROCEDURE — C1894: CPT

## 2022-12-12 PROCEDURE — 76937 US GUIDE VASCULAR ACCESS: CPT

## 2022-12-12 PROCEDURE — 77001 FLUOROGUIDE FOR VEIN DEVICE: CPT

## 2022-12-12 PROCEDURE — 77001 FLUOROGUIDE FOR VEIN DEVICE: CPT | Mod: 26

## 2022-12-12 PROCEDURE — C1887: CPT

## 2022-12-12 PROCEDURE — C1788: CPT

## 2022-12-12 PROCEDURE — C1769: CPT

## 2022-12-12 RX ORDER — CHLORHEXIDINE GLUCONATE 213 G/1000ML
1 SOLUTION TOPICAL
Refills: 0 | Status: DISCONTINUED | OUTPATIENT
Start: 2022-12-12 | End: 2022-12-26

## 2022-12-12 RX ORDER — ACETAMINOPHEN 500 MG
1000 TABLET ORAL ONCE
Refills: 0 | Status: COMPLETED | OUTPATIENT
Start: 2022-12-12 | End: 2022-12-12

## 2022-12-12 RX ORDER — FENTANYL CITRATE 50 UG/ML
25 INJECTION INTRAVENOUS
Refills: 0 | Status: DISCONTINUED | OUTPATIENT
Start: 2022-12-12 | End: 2022-12-12

## 2022-12-12 RX ORDER — ONDANSETRON 8 MG/1
4 TABLET, FILM COATED ORAL ONCE
Refills: 0 | Status: DISCONTINUED | OUTPATIENT
Start: 2022-12-12 | End: 2022-12-26

## 2022-12-12 RX ORDER — SODIUM CHLORIDE 9 MG/ML
10 INJECTION INTRAMUSCULAR; INTRAVENOUS; SUBCUTANEOUS
Refills: 0 | Status: DISCONTINUED | OUTPATIENT
Start: 2022-12-12 | End: 2022-12-26

## 2022-12-12 RX ADMIN — Medication 1000 MILLIGRAM(S): at 15:29

## 2022-12-12 RX ADMIN — Medication 400 MILLIGRAM(S): at 15:00

## 2022-12-12 NOTE — PRE-ANESTHESIA EVALUATION ADULT - ANESTHESIA, PREVIOUS REACTION, PROFILE
reports requiring additional O2 when coming out of anesthesia due to "lower numbers" but comes right back up with deep breaths/none

## 2022-12-12 NOTE — PRE PROCEDURE NOTE - PRE PROCEDURE EVALUATION
Interventional Radiology    HPI: 67y Male with newly diagnosed urothelial ca who presents to IR for chest wall port placement for chemotherapy.     Allergies:   Medications (Abx/Cardiac/Anticoagulation/Blood Products)      Data:  175.3  122  T(C): 36.7  HR: 80  BP: 137/70  RR: 18  SpO2: 99%    Exam  General: No acute distress  Chest: Non labored breathing      Plan: 67y Male presents for chest wall port placement.   -Risks/Benefits/alternatives explained with the patient and/or healthcare proxy and witnessed informed consent obtained.   
Interventional Radiology Pre-Procedure Note    Diagnosis/Indication: Patient is a 67y old  Male who presents for a chest port placement for initiation of chemotherapy.    PAST MEDICAL & SURGICAL HISTORY:  Parkinson disease      Hypertension      Hypothyroid      Anxiety      Rectal mass      Osteoarthritis      Edema leg      BALJINDER (obstructive sleep apnea)  based on stop bang score of 6      History of hip replacement  right      H/O resection of rectum  large mass, was non-cancerous      H/O ileostomy  reversed 12/2014      H/O laminectomy  lumbar x2      S/P hernia repair  incisional           Allergies: No Known Allergies      LABS:              Procedure/ risks/ benefits were explained, informed consent obtained from patient, verbalizes understanding.

## 2022-12-12 NOTE — ASU PATIENT PROFILE, ADULT - FALL HARM RISK - RISK INTERVENTIONS

## 2022-12-12 NOTE — ASU DISCHARGE PLAN (ADULT/PEDIATRIC) - ASU DC SPECIAL INSTRUCTIONSFT
Chest Port Placement    Discharge Instructions  - You have had a chest port implanted in your chest.   - The port is ready for use.  - You may shower in 48 hours. No soaking or swimming for 2 weeks or until the site is completely healed.  - Keep the area covered and dry for the next 2days. It may be removed by a chemotherapy nurse as needed for treatment.  - Do not perform any heavy lifting or put tension on the area for the next week or until the site is healed.  - You may resume your normal diet.  - You may resume your normal medications however you should wait 48 hours before restarting aspirin, plavix, or blood thinners.  - It is normal to experience some pain over the site for the next few days. You may take Tylenol for that pain. Do not take more frequently than every 6 hours and do not exceed more than 3000mg of Tylenol in a 24 hour period.  - You were given conscious sedation which may make you drowsy, therefore you need someone to stay with you until the morning following the procedure.  - Do not drive, engage in heavy lifting or strenuous activity, or drink any alcoholic beverages for the next 24 hours.   - You may resume normal activity in 24 hours.    Notify your primary physician and/or Interventional Radiology IMMEDIATELY if you experience any of the following       - Fever of 101F or 38C       - Chills or Rigors/ Shakes       - Swelling and/or Redness in the area around the port       - Worsening Pain       - Blood soaked bandages or worsening bleeding       - Lightheadedness and/or dizziness upon standing       - Chest Pain/ Tightness       - Shortness of Breath       - Difficulty walking    If you have a problem that you believe requires IMMEDIATE attention, please go to your NEAREST Emergency Room. If you believe your problem can safely wait until you speak to a physician, please call Interventional Radiology for any concerns.      Please feel free to contact us at [phone] if any problems arise. After 6PM, Monday through Friday, on weekends and on holidays, please call [phone] and ask for the radiology resident on call to be paged.

## 2022-12-12 NOTE — ASU DISCHARGE PLAN (ADULT/PEDIATRIC) - NURSING INSTRUCTIONS
Please feel free to contact us at [phone] if any problems arise. After 6PM, Monday through Friday, on weekends and on holidays, please call [phone] and ask for the radiology resident on call to be paged.

## 2022-12-12 NOTE — ASU DISCHARGE PLAN (ADULT/PEDIATRIC) - NS MD DC FALL RISK RISK
For information on Fall & Injury Prevention, visit: [email or web address] OR  [email or web address] OR  https://www.cdc.gov/steadi/patient.html

## 2022-12-12 NOTE — PROCEDURE NOTE - PROCEDURE FINDINGS AND DETAILS
Ultrasound demonstrates patent left internal jugular vein. Left IJ vein accessed.  Left chest wall port placed with tip confirmed at the cavoatrial junction.  Full report to follow.

## 2022-12-13 ENCOUNTER — RESULT REVIEW (OUTPATIENT)
Age: 67
End: 2022-12-13

## 2022-12-13 ENCOUNTER — NON-APPOINTMENT (OUTPATIENT)
Age: 67
End: 2022-12-13

## 2022-12-13 ENCOUNTER — APPOINTMENT (OUTPATIENT)
Dept: INFUSION THERAPY | Facility: HOSPITAL | Age: 67
End: 2022-12-13

## 2022-12-13 LAB
ALBUMIN SERPL ELPH-MCNC: 3.7 G/DL
ALP BLD-CCNC: 104 U/L
ALT SERPL-CCNC: 18 U/L
ANION GAP SERPL CALC-SCNC: 22 MMOL/L
AST SERPL-CCNC: 39 U/L
BASOPHILS # BLD AUTO: 0.07 K/UL — SIGNIFICANT CHANGE UP (ref 0–0.2)
BASOPHILS NFR BLD AUTO: 0.5 % — SIGNIFICANT CHANGE UP (ref 0–2)
BILIRUB SERPL-MCNC: 0.4 MG/DL
BUN SERPL-MCNC: 15 MG/DL
CALCIUM SERPL-MCNC: 9.2 MG/DL
CHLORIDE SERPL-SCNC: 104 MMOL/L
CO2 SERPL-SCNC: 15 MMOL/L
CREAT SERPL-MCNC: 0.87 MG/DL
EGFR: 95 ML/MIN/1.73M2
EOSINOPHIL # BLD AUTO: 0.31 K/UL — SIGNIFICANT CHANGE UP (ref 0–0.5)
EOSINOPHIL NFR BLD AUTO: 2.4 % — SIGNIFICANT CHANGE UP (ref 0–6)
GLUCOSE SERPL-MCNC: 115 MG/DL
HBV CORE AB SER-ACNC: SIGNIFICANT CHANGE UP
HBV SURFACE AB SER-ACNC: SIGNIFICANT CHANGE UP
HBV SURFACE AG SER-ACNC: SIGNIFICANT CHANGE UP
HCT VFR BLD CALC: 37.7 % — LOW (ref 39–50)
HCV AB S/CO SERPL IA: 0.12 S/CO — SIGNIFICANT CHANGE UP (ref 0–0.99)
HCV AB SERPL-IMP: SIGNIFICANT CHANGE UP
HGB BLD-MCNC: 12.2 G/DL — LOW (ref 13–17)
IMM GRANULOCYTES NFR BLD AUTO: 0.6 % — SIGNIFICANT CHANGE UP (ref 0–0.9)
LYMPHOCYTES # BLD AUTO: 1.29 K/UL — SIGNIFICANT CHANGE UP (ref 1–3.3)
LYMPHOCYTES # BLD AUTO: 10 % — LOW (ref 13–44)
MCHC RBC-ENTMCNC: 27.1 PG — SIGNIFICANT CHANGE UP (ref 27–34)
MCHC RBC-ENTMCNC: 32.4 G/DL — SIGNIFICANT CHANGE UP (ref 32–36)
MCV RBC AUTO: 83.6 FL — SIGNIFICANT CHANGE UP (ref 80–100)
MONOCYTES # BLD AUTO: 0.79 K/UL — SIGNIFICANT CHANGE UP (ref 0–0.9)
MONOCYTES NFR BLD AUTO: 6.1 % — SIGNIFICANT CHANGE UP (ref 2–14)
NEUTROPHILS # BLD AUTO: 10.37 K/UL — HIGH (ref 1.8–7.4)
NEUTROPHILS NFR BLD AUTO: 80.4 % — HIGH (ref 43–77)
NRBC # BLD: 0 /100 WBCS — SIGNIFICANT CHANGE UP (ref 0–0)
PLATELET # BLD AUTO: 329 K/UL — SIGNIFICANT CHANGE UP (ref 150–400)
POTASSIUM SERPL-SCNC: 4.4 MMOL/L
PROT SERPL-MCNC: 7.3 G/DL
RBC # BLD: 4.51 M/UL — SIGNIFICANT CHANGE UP (ref 4.2–5.8)
RBC # FLD: 14 % — SIGNIFICANT CHANGE UP (ref 10.3–14.5)
SODIUM SERPL-SCNC: 141 MMOL/L
WBC # BLD: 12.91 K/UL — HIGH (ref 3.8–10.5)
WBC # FLD AUTO: 12.91 K/UL — HIGH (ref 3.8–10.5)

## 2022-12-13 PROCEDURE — 93010 ELECTROCARDIOGRAM REPORT: CPT

## 2022-12-13 RX ORDER — LIDOCAINE AND PRILOCAINE 25; 25 MG/G; MG/G
2.5-2.5 CREAM TOPICAL
Qty: 1 | Refills: 2 | Status: ACTIVE | COMMUNITY
Start: 2022-12-13 | End: 1900-01-01

## 2022-12-14 DIAGNOSIS — Z51.11 ENCOUNTER FOR ANTINEOPLASTIC CHEMOTHERAPY: ICD-10-CM

## 2022-12-14 DIAGNOSIS — C67.9 MALIGNANT NEOPLASM OF BLADDER, UNSPECIFIED: ICD-10-CM

## 2022-12-14 DIAGNOSIS — R11.2 NAUSEA WITH VOMITING, UNSPECIFIED: ICD-10-CM

## 2022-12-14 DIAGNOSIS — E86.0 DEHYDRATION: ICD-10-CM

## 2022-12-16 ENCOUNTER — NON-APPOINTMENT (OUTPATIENT)
Age: 67
End: 2022-12-16

## 2022-12-19 ENCOUNTER — APPOINTMENT (OUTPATIENT)
Dept: HEMATOLOGY ONCOLOGY | Facility: CLINIC | Age: 67
End: 2022-12-19

## 2022-12-19 ENCOUNTER — RESULT REVIEW (OUTPATIENT)
Age: 67
End: 2022-12-19

## 2022-12-19 ENCOUNTER — APPOINTMENT (OUTPATIENT)
Dept: INFUSION THERAPY | Facility: HOSPITAL | Age: 67
End: 2022-12-19

## 2022-12-19 VITALS
DIASTOLIC BLOOD PRESSURE: 77 MMHG | SYSTOLIC BLOOD PRESSURE: 124 MMHG | WEIGHT: 269.63 LBS | RESPIRATION RATE: 16 BRPM | BODY MASS INDEX: 38.69 KG/M2 | HEART RATE: 77 BPM | OXYGEN SATURATION: 96 % | TEMPERATURE: 97.2 F

## 2022-12-19 LAB
ALBUMIN SERPL ELPH-MCNC: 3.4 G/DL — SIGNIFICANT CHANGE UP (ref 3.3–5)
ALP SERPL-CCNC: 72 U/L — SIGNIFICANT CHANGE UP (ref 40–120)
ALT FLD-CCNC: 24 U/L — SIGNIFICANT CHANGE UP (ref 10–45)
ANION GAP SERPL CALC-SCNC: 11 MMOL/L — SIGNIFICANT CHANGE UP (ref 5–17)
AST SERPL-CCNC: 11 U/L — SIGNIFICANT CHANGE UP (ref 10–40)
BASOPHILS # BLD AUTO: 0.01 K/UL — SIGNIFICANT CHANGE UP (ref 0–0.2)
BASOPHILS NFR BLD AUTO: 0.1 % — SIGNIFICANT CHANGE UP (ref 0–2)
BILIRUB SERPL-MCNC: 0.4 MG/DL — SIGNIFICANT CHANGE UP (ref 0.2–1.2)
BUN SERPL-MCNC: 29 MG/DL — HIGH (ref 7–23)
CALCIUM SERPL-MCNC: 8.5 MG/DL — SIGNIFICANT CHANGE UP (ref 8.4–10.5)
CHLORIDE SERPL-SCNC: 96 MMOL/L — SIGNIFICANT CHANGE UP (ref 96–108)
CO2 SERPL-SCNC: 29 MMOL/L — SIGNIFICANT CHANGE UP (ref 22–31)
CREAT SERPL-MCNC: 1.09 MG/DL — SIGNIFICANT CHANGE UP (ref 0.5–1.3)
EGFR: 75 ML/MIN/1.73M2 — SIGNIFICANT CHANGE UP
EOSINOPHIL # BLD AUTO: 0.08 K/UL — SIGNIFICANT CHANGE UP (ref 0–0.5)
EOSINOPHIL NFR BLD AUTO: 1 % — SIGNIFICANT CHANGE UP (ref 0–6)
GLUCOSE SERPL-MCNC: 133 MG/DL — HIGH (ref 70–99)
HCT VFR BLD CALC: 38.5 % — LOW (ref 39–50)
HGB BLD-MCNC: 12.5 G/DL — LOW (ref 13–17)
IMM GRANULOCYTES NFR BLD AUTO: 0.6 % — SIGNIFICANT CHANGE UP (ref 0–0.9)
LYMPHOCYTES # BLD AUTO: 0.99 K/UL — LOW (ref 1–3.3)
LYMPHOCYTES # BLD AUTO: 12.8 % — LOW (ref 13–44)
MAGNESIUM SERPL-MCNC: 1.4 MG/DL — LOW (ref 1.6–2.6)
MCHC RBC-ENTMCNC: 26.8 PG — LOW (ref 27–34)
MCHC RBC-ENTMCNC: 32.5 G/DL — SIGNIFICANT CHANGE UP (ref 32–36)
MCV RBC AUTO: 82.4 FL — SIGNIFICANT CHANGE UP (ref 80–100)
MONOCYTES # BLD AUTO: 0.22 K/UL — SIGNIFICANT CHANGE UP (ref 0–0.9)
MONOCYTES NFR BLD AUTO: 2.8 % — SIGNIFICANT CHANGE UP (ref 2–14)
NEUTROPHILS # BLD AUTO: 6.38 K/UL — SIGNIFICANT CHANGE UP (ref 1.8–7.4)
NEUTROPHILS NFR BLD AUTO: 82.7 % — HIGH (ref 43–77)
NRBC # BLD: 0 /100 WBCS — SIGNIFICANT CHANGE UP (ref 0–0)
PLATELET # BLD AUTO: 250 K/UL — SIGNIFICANT CHANGE UP (ref 150–400)
POTASSIUM SERPL-MCNC: 3.3 MMOL/L — LOW (ref 3.5–5.3)
POTASSIUM SERPL-SCNC: 3.3 MMOL/L — LOW (ref 3.5–5.3)
PROT SERPL-MCNC: 6.4 G/DL — SIGNIFICANT CHANGE UP (ref 6–8.3)
RBC # BLD: 4.67 M/UL — SIGNIFICANT CHANGE UP (ref 4.2–5.8)
RBC # FLD: 13.8 % — SIGNIFICANT CHANGE UP (ref 10.3–14.5)
SODIUM SERPL-SCNC: 136 MMOL/L — SIGNIFICANT CHANGE UP (ref 135–145)
WBC # BLD: 7.73 K/UL — SIGNIFICANT CHANGE UP (ref 3.8–10.5)
WBC # FLD AUTO: 7.73 K/UL — SIGNIFICANT CHANGE UP (ref 3.8–10.5)

## 2022-12-19 PROCEDURE — 99214 OFFICE O/P EST MOD 30 MIN: CPT

## 2022-12-19 RX ORDER — MULTIVITAMIN
TABLET ORAL
Refills: 0 | Status: DISCONTINUED | COMMUNITY
End: 2022-12-19

## 2022-12-19 RX ORDER — ASCORBIC ACID 500 MG
500 TABLET ORAL
Refills: 0 | Status: DISCONTINUED | COMMUNITY
End: 2022-12-19

## 2022-12-19 RX ORDER — RIB BELT
EACH MISCELLANEOUS
Refills: 0 | Status: DISCONTINUED | COMMUNITY
End: 2022-12-19

## 2022-12-19 NOTE — ASSESSMENT
[FreeTextEntry1] : Regis is seen in the office in follow-up today, accompanied by his wife.  Today is day 7, cycle #1 of cisplatin and gemcitabine and is due for gemcitabine alone today.  He reports that he feels "good".  He did not experience any fatigue until 2 days ago and he remains fatigued at this time.  He is less active.  He takes some brief naps but he is not sleeping well at night.  Some of the sleeping issues may be related to the dexamethasone that he received as an antiemetic.  However, he says that he is not slept well since the ureteral stent was placed.  Urinary flow is good.  Daytime frequency occurs nocturia occurs 3-4 times.  There is no urgency or incontinence.  There is occasional blood in the urine without clots.  He has some cough but there is no shortness of breath.  There is no fevers or chills.  He has some mild constipation and he takes senna Colace and MiraLAX.  There were no headaches or dizziness.  He does have balance issues related to his Parkinson's.  He uses a Rollator outside but not within the house.  He has not fallen recently.  There is no edema of the extremities.  He has no chest pain chest pressure or palpitations.  He did note some occasional tinnitus of very brief duration.  There is no disclosure.  He does have some anxiety.  There were no paresthesias.\par \par On physical examination, he appears well and in no acute distress.  His performance status is 1.  The blood pressure was 124/77.  The HEENT examination was normal.  The lungs are clear and the heart examination was normal.  There was trace edema of the ankles.  There is no spinal column or chest wall tenderness to palpation or percussion.  His tremor is seemingly less today, and it is variable when he is very anxious.  The abdominal examination is normal.\par \par Today's white blood cell count was 7.73 with a hemoglobin value of 12.5 g and a platelet count of 250,000.  Today is day 7.  He will have a repeat blood test performed on Friday of this week which will be day 12.  Typically, the white blood cell count goes to his lowest value between days 10 and 14 after the start of chemotherapy.  We discussed contingencies in the event that his white blood cell count is low at that time.  He would be given Cipro as prophylaxis.  I once again reiterated that any fevers chills or otherwise feeling poorly should be reported to our emergency number regardless of the time of the day.\par \par All questions were answered to the best of my ability and to their apparent satisfaction.

## 2022-12-19 NOTE — PHYSICAL EXAM
[Restricted in physically strenuous activity but ambulatory and able to carry out work of a light or sedentary nature] : Status 1- Restricted in physically strenuous activity but ambulatory and able to carry out work of a light or sedentary nature, e.g., light house work, office work [Normal] : affect appropriate [de-identified] : trace edema [de-identified] : mild tremor

## 2022-12-19 NOTE — HISTORY OF PRESENT ILLNESS
[Date: ____________] : Patient's last distress assessment performed on [unfilled]. [8 - Distress Level] : Distress Level: 8 [Patient given social work contact information and resource sheet] : Patient was given social work contact information and resource sheet [de-identified] : Regis Mcintosh is seen in consultation on November 28, 2022.  He was referred for evaluation of bilateral renal masses.  2 weeks prior, he was experiencing gross hematuria which was intermittent.  There were no clots.  He had no flank pain.  He did notes some "tightness" on the left side, ? to Parkinson's?.  He has a history of stones in the past.  CT was done as an a outpatient at [facility].  A mass on the right kidney extends into the sinus near the division of the right renal artery.  The imaging was most compatible with renal cell carcinoma.  The left kidney revealed a hydronephrotic pelvis.  There was hyperdense material on the noncontrast CT scan, and it partially enhances with contrast.  There were some enlarged nodes on the left side.  A CT scan of the chest was negative.  He was seen by Dr. Hernandez on November 23, and he was concerned that the left likely represents urothelial carcinoma with the associated lymphadenopathy.  The plan is for ureteroscopy and biopsy with a JJ stent, scheduled for December 1, 2022 .  His creatinine was 0.9.  He is referred for consideration of the possibility of neoadjuvant chemotherapy.  I spoke with Dr. Hernandez after the patient was evaluated by him.  He feels that the right side kidney mass might be amenable to a partial nephrectomy.\par \par He was initially evaluated by Dr. Sean Jurado.  He had a PSA elevation in September 2020 at 4.76.  In 2018 his PSA was 1.9.  In November 2021, his PSA was 3.88.  In mid November he had an Enterococcus faecalis urinary tract infection and was treated.  A CT urogram was performed revealing bilateral renal masses concerning for malignant etiologies.  The right renal mass measured up to 7.1 cm and was concerning for renal cell carcinoma.  It was endophytic.  There were subtle features present raising concern for possible early vascular and urinary collecting system invasion.  The left renal pelvis mass measured 7.2 cm and had the radiologic features of urothelial carcinoma.  The mass was enhancing.  Retroperitoneal adenopathy was present.  The bladder was partly obscured due to right hip arthroplasty artifact.  No definite bladder lesion was identified.  There was a nonobstructing 0.5 cm right sided ureteral stone located distally.  The report says that there was stable lower chest findings including mildly enlarged lower mediastinal lymph node and a tiny lung base nodule.  These findings were stable from a CT scan 2 years ago.\par \par There was new para-aortic lymphadenopathy compared to the prior scan from 2 years ago.  There is a left para-aortic lymph node at the level of the renal vasculature measuring 2.1 x 1.5 cm.  An anterior periaortic lymph node at the level of the LEONEL was 2.1 x 1.5 cm.\par Cystoscopy was performed on November 21 by Dr. Jurado.  Urethra was normal.  The prostatic urethra demonstrated obstruction due to lateral lobe enlargement of the prostate.  The bladder was normal.  Both ureteral orifices revealed clear E flux of urine.  No tumors were noted within the bladder.\par \par Overall, he feels "fine". He has minor aches and pains, present for a long time. He has some difficulty walking, can't do long distance, standing in one spot is an issue, diagnosed with the Parkinson's in 2010. Had DBS placed 3 years ago. He says that helped his symptoms a lot. NO headaches, no dizziness, some balance issues. No falls except once this summer, while trying to get out of the pool. Appetite has been normal, no weight loss. Occ swelling of the legs, no h/o DVT. No chest pain/pressure/palpitations. No cough, no SALDANA. Denies fatigue. Works daily, goes to PT once weekly, and home PT once a week in addition. Rides stationary bike a few times a week. No prior h/o hematuria.  [FreeTextEntry1] : 12/13/22....cis/gem started [de-identified] : 12/19/22...started cis/gem on 12/13/22, day 7, cycle 1. Feels "good". He had no fatigue until 2 days ago, started 12/17, remains fatigued. Less active. Brief naps, not sleeping well at night. This has been the case since the ureteral stent. Flow is good, daytime frequency, nocturia x 3-4. No urgency. No incontinence. Occ blood, no clots. Some cough no SALDANA. NO fevers, no chills. No N/V/D, some constipation, on senna, Colace and MiraLAX. No headaches, no dizziness. Balance issues due to Parkinson's, uses rollator outside, but not in the house. No recent falls. No edema. No chest pain/pressure/palpitations. URI, with cough, using Robitussin

## 2022-12-19 NOTE — REVIEW OF SYSTEMS
[Fatigue] : fatigue [Cough] : cough [Constipation] : constipation [Difficulty Walking] : difficulty walking [Anxiety] : anxiety [Depression] : depression [Negative] : ENT [Fever] : no fever [Chills] : no chills [Recent Change In Weight] : ~T no recent weight change [Chest Pain] : no chest pain [Palpitations] : no palpitations [Lower Ext Edema] : no lower extremity edema [Wheezing] : no wheezing [SOB on Exertion] : no shortness of breath during exertion [Abdominal Pain] : no abdominal pain [Vomiting] : no vomiting [Diarrhea] : no diarrhea [Dysuria] : no dysuria [Incontinence] : no incontinence [Joint Pain] : no joint pain [Joint Stiffness] : no joint stiffness [Muscle Pain] : no muscle pain [Skin Rash] : no skin rash [Dizziness] : no dizziness [Muscle Weakness] : no muscle weakness [Easy Bleeding] : no tendency for easy bleeding [Easy Bruising] : no tendency for easy bruising [FreeTextEntry4] : occ tinnitus, no dysgeusia [FreeTextEntry9] : muscle stiffness, no cramps [de-identified] : Nom HA, no paresthesias

## 2022-12-20 ENCOUNTER — NON-APPOINTMENT (OUTPATIENT)
Age: 67
End: 2022-12-20

## 2022-12-20 DIAGNOSIS — N28.89 OTHER SPECIFIED DISORDERS OF KIDNEY AND URETER: ICD-10-CM

## 2022-12-20 DIAGNOSIS — Z45.2 ENCOUNTER FOR ADJUSTMENT AND MANAGEMENT OF VASCULAR ACCESS DEVICE: ICD-10-CM

## 2022-12-21 ENCOUNTER — NON-APPOINTMENT (OUTPATIENT)
Age: 67
End: 2022-12-21

## 2022-12-23 DIAGNOSIS — R05.9 COUGH, UNSPECIFIED: ICD-10-CM

## 2022-12-23 LAB
ALBUMIN SERPL ELPH-MCNC: 3.7 G/DL
ALP BLD-CCNC: 70 U/L
ALT SERPL-CCNC: 11 U/L
ANION GAP SERPL CALC-SCNC: 16 MMOL/L
AST SERPL-CCNC: 17 U/L
BILIRUB SERPL-MCNC: 0.6 MG/DL
BUN SERPL-MCNC: 27 MG/DL
CALCIUM SERPL-MCNC: 8.7 MG/DL
CHLORIDE SERPL-SCNC: 98 MMOL/L
CO2 SERPL-SCNC: 23 MMOL/L
CREAT SERPL-MCNC: 1.34 MG/DL
EGFR: 58 ML/MIN/1.73M2
GLUCOSE SERPL-MCNC: 118 MG/DL
MAGNESIUM SERPL-MCNC: 1.3 MG/DL
POTASSIUM SERPL-SCNC: 3.6 MMOL/L
PROT SERPL-MCNC: 6.4 G/DL
SODIUM SERPL-SCNC: 137 MMOL/L

## 2022-12-26 ENCOUNTER — NON-APPOINTMENT (OUTPATIENT)
Age: 67
End: 2022-12-26

## 2022-12-27 ENCOUNTER — NON-APPOINTMENT (OUTPATIENT)
Age: 67
End: 2022-12-27

## 2022-12-27 ENCOUNTER — APPOINTMENT (OUTPATIENT)
Dept: UROLOGY | Facility: CLINIC | Age: 67
End: 2022-12-27
Payer: MEDICARE

## 2022-12-27 VITALS
DIASTOLIC BLOOD PRESSURE: 68 MMHG | TEMPERATURE: 97.3 F | OXYGEN SATURATION: 97 % | HEART RATE: 82 BPM | SYSTOLIC BLOOD PRESSURE: 122 MMHG

## 2022-12-27 PROBLEM — E87.6 HYPOKALEMIA: Status: ACTIVE | Noted: 2022-12-20

## 2022-12-27 PROCEDURE — 99215 OFFICE O/P EST HI 40 MIN: CPT

## 2022-12-27 NOTE — HISTORY OF PRESENT ILLNESS
[FreeTextEntry1] : CC: Bilateral renal masses.\par Gross hematuria in November. \par \par 7.1 cm mass right kidney - extends into sinus near division of the right renal artery. imaging most c/w RCC.\par Left kidney with 7.2 cm mass, c/w TCC. Paraaortic LAD, 2.1x1.5. Periarotic LN 2.1 x 1.5cm.  Cystoscopy by Dr. Jurado reportedly without tumors. \par \par Dr. Hernandez - ureteroscopy and biopsy JJ stent; 22: invasive urothelial carcinoma with squamous differentiation; high grade; invasive into LP.  \par \par Of note his CT 2022 demonstrated a 5 mm right sided ureteral stone distally \par \par He is currently on [place]/Cis chemotherapy. \par \par CT Chest negative \par \par PSA elevation in 2020 at 4.76. In  his PSA was 1.9. In 2021, his PSA was 3.88. 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

## 2022-12-27 NOTE — ASSESSMENT
[FreeTextEntry1] : Diagnosis\par Left UT TCC with LAD\par Right solid enhancing renal mass\par \par Plan: \par Agree with G/C chemotherapy; then image and if responded then left NephUx/LND\par Right side almost certainly RCC, however, given his need for left NephUx and risks of CKD/ESRD would consider sestamibi scan/renal biopsy. \par \par Christopher Platt MD, FACS, FRCS \par  of Urology [facility]\par Director of Laparoscopic and Robotic Surgery \par [facility] Director of Urology, [facility] \par Professor of Urology\par \par (Office) \par (Cell)  [phone] \par [email or web address] \par \par

## 2022-12-27 NOTE — PHYSICAL EXAM
[General Appearance - Well Developed] : well developed [General Appearance - Well Nourished] : well nourished [Normal Appearance] : normal appearance [Well Groomed] : well groomed [General Appearance - In No Acute Distress] : no acute distress [Edema] : no peripheral edema [] : no respiratory distress [Respiration, Rhythm And Depth] : normal respiratory rhythm and effort [Exaggerated Use Of Accessory Muscles For Inspiration] : no accessory muscle use [Abdomen Soft] : soft [Abdomen Tenderness] : non-tender [Costovertebral Angle Tenderness] : no ~M costovertebral angle tenderness [Testes Mass (___cm)] : there were no testicular masses [No Prostate Nodules] : no prostate nodules [Skin Turgor] : supple [FreeTextEntry1] : flank and back rash  [No Focal Deficits] : no focal deficits [Oriented To Time, Place, And Person] : oriented to person, place, and time [Affect] : the affect was normal [Mood] : the mood was normal [Not Anxious] : not anxious [No Palpable Adenopathy] : no palpable adenopathy

## 2022-12-27 NOTE — CONSULT LETTER
[Dear  ___] : Dear  [unfilled], [Courtesy Letter:] : I had the pleasure of seeing your patient, [unfilled], in my office today. [Please see my note below.] : Please see my note below. [Consult Closing:] : Thank you very much for allowing me to participate in the care of this patient.  If you have any questions, please do not hesitate to contact me. [Sincerely,] : Sincerely, [DrJazzmine  ___] : Dr. LOAIZA [DrJazzmine ___] : Dr. LOAIZA

## 2022-12-29 ENCOUNTER — RESULT REVIEW (OUTPATIENT)
Age: 67
End: 2022-12-29

## 2022-12-29 ENCOUNTER — APPOINTMENT (OUTPATIENT)
Dept: HEMATOLOGY ONCOLOGY | Facility: CLINIC | Age: 67
End: 2022-12-29
Payer: MEDICARE

## 2022-12-29 VITALS
HEART RATE: 70 BPM | DIASTOLIC BLOOD PRESSURE: 73 MMHG | HEIGHT: 69.92 IN | TEMPERATURE: 97.2 F | BODY MASS INDEX: 40.56 KG/M2 | SYSTOLIC BLOOD PRESSURE: 142 MMHG | RESPIRATION RATE: 16 BRPM | WEIGHT: 283.29 LBS | OXYGEN SATURATION: 96 %

## 2022-12-29 DIAGNOSIS — E87.6 HYPOKALEMIA: ICD-10-CM

## 2022-12-29 LAB
ALBUMIN SERPL ELPH-MCNC: 3.6 G/DL
ALP BLD-CCNC: 77 U/L
ALT SERPL-CCNC: 12 U/L
ANION GAP SERPL CALC-SCNC: 12 MMOL/L
AST SERPL-CCNC: 14 U/L
BASOPHILS # BLD AUTO: 0.03 K/UL — SIGNIFICANT CHANGE UP (ref 0–0.2)
BASOPHILS NFR BLD AUTO: 0.5 % — SIGNIFICANT CHANGE UP (ref 0–2)
BILIRUB SERPL-MCNC: 0.2 MG/DL
BUN SERPL-MCNC: 24 MG/DL
CALCIUM SERPL-MCNC: 8.8 MG/DL
CHLORIDE SERPL-SCNC: 103 MMOL/L
CO2 SERPL-SCNC: 25 MMOL/L
CREAT SERPL-MCNC: 1.3 MG/DL
EGFR: 61 ML/MIN/1.73M2
EOSINOPHIL # BLD AUTO: 0.51 K/UL — HIGH (ref 0–0.5)
EOSINOPHIL NFR BLD AUTO: 8.8 % — HIGH (ref 0–6)
GLUCOSE SERPL-MCNC: 110 MG/DL
HCT VFR BLD CALC: 32.9 % — LOW (ref 39–50)
HGB BLD-MCNC: 10.4 G/DL — LOW (ref 13–17)
IMM GRANULOCYTES NFR BLD AUTO: 0.5 % — SIGNIFICANT CHANGE UP (ref 0–0.9)
LYMPHOCYTES # BLD AUTO: 1.36 K/UL — SIGNIFICANT CHANGE UP (ref 1–3.3)
LYMPHOCYTES # BLD AUTO: 23.4 % — SIGNIFICANT CHANGE UP (ref 13–44)
MAGNESIUM SERPL-MCNC: 1.4 MG/DL
MCHC RBC-ENTMCNC: 27.2 PG — SIGNIFICANT CHANGE UP (ref 27–34)
MCHC RBC-ENTMCNC: 31.6 G/DL — LOW (ref 32–36)
MCV RBC AUTO: 85.9 FL — SIGNIFICANT CHANGE UP (ref 80–100)
MONOCYTES # BLD AUTO: 0.56 K/UL — SIGNIFICANT CHANGE UP (ref 0–0.9)
MONOCYTES NFR BLD AUTO: 9.7 % — SIGNIFICANT CHANGE UP (ref 2–14)
NEUTROPHILS # BLD AUTO: 3.31 K/UL — SIGNIFICANT CHANGE UP (ref 1.8–7.4)
NEUTROPHILS NFR BLD AUTO: 57.1 % — SIGNIFICANT CHANGE UP (ref 43–77)
NRBC # BLD: 0 /100 WBCS — SIGNIFICANT CHANGE UP (ref 0–0)
PLATELET # BLD AUTO: 246 K/UL — SIGNIFICANT CHANGE UP (ref 150–400)
POTASSIUM SERPL-SCNC: 4.3 MMOL/L
PROT SERPL-MCNC: 6.1 G/DL
RBC # BLD: 3.83 M/UL — LOW (ref 4.2–5.8)
RBC # FLD: 13.7 % — SIGNIFICANT CHANGE UP (ref 10.3–14.5)
SODIUM SERPL-SCNC: 140 MMOL/L
WBC # BLD: 5.8 K/UL — SIGNIFICANT CHANGE UP (ref 3.8–10.5)
WBC # FLD AUTO: 5.8 K/UL — SIGNIFICANT CHANGE UP (ref 3.8–10.5)

## 2022-12-29 PROCEDURE — 99213 OFFICE O/P EST LOW 20 MIN: CPT

## 2022-12-30 NOTE — ASSESSMENT
[Palliative Care Plan] : not applicable at this time [FreeTextEntry1] : Regis Juan is seen today for f/u of upper tract urothelial carcinoma. Cisplatin + gemcitabine started 12/13/22. \par \par Urothelial carcinoma:\par - Cisplatin + gemcitabine cycle 1 day 17 today. Tolerating well with no significant AEs. \par - Proceed with cycle 2 on 1/4/23. \par \par Anemia:\par - Hgb = 10.4, stable.\par - Continue to monitor closely. \par \par Rash:\par - Last week rash started in groin area, he used nystatin cream bid with improvement in the groin area but this week noticed new erythematous and pruritic rash on his back all the way down his backside to posterior knees. Wife reports the rash was "fire engine red". He is using hydrocortisone cream bid with improvement in erythema and pruritus. \par - On exam today there is mild erythema of his mid-to-low back, posterior thighs, and posterior knees. There is erythema and noticeable moisture in the right groin. \par - Continue nystatin cream bid to groin area for suspected fungal infection, encouraged to keep the area dry and clean. \par - Erythema on backside is likely gemcitabine-associated rash, no improving. Continue OTC hydrocortisone cream bid and monitor closely. \par \par Hypomagnesemia:\par - Mg = 1.4 today, increase MgOx 400mg to bid\par \par Hypokalemia:\par - 12/19/22 K = 3.3, started KCL 20meq daily\par - K = 4.3 today\par - Continue KCL 20meq daily\par \par Weight gain:\par - Weight is up 14lbs over the past 10 days. He denies increased PO intake and there is no evidence of fluid retention on exam. It is unclear if the prior weight from 12/19 may have been incorrect.\par - Monitor closely. \par \par Instructed to contact our office with any new/worsening symptoms.\par Pt and wife educated regarding plan of care, all questions/concerns addressed to the best of my abilities and their apparent satisfaction.\par RTC 1/4/22 for cisplatin + gemcitabine cycle 2\par F/u with provider in 3wks

## 2022-12-30 NOTE — REVIEW OF SYSTEMS
[Fatigue] : fatigue [Recent Change In Weight] : ~T recent weight change [Lower Ext Edema] : lower extremity edema [Cough] : cough [Muscle Pain] : muscle pain [Skin Rash] : skin rash [Fever] : no fever [Chills] : no chills [Night Sweats] : no night sweats [Eye Pain] : no eye pain [Vision Problems] : no vision problems [Dysphagia] : no dysphagia [Odynophagia] : no odynophagia [Mucosal Pain] : no mucosal pain [Chest Pain] : no chest pain [Palpitations] : no palpitations [Shortness Of Breath] : no shortness of breath [Abdominal Pain] : no abdominal pain [Vomiting] : no vomiting [Constipation] : no constipation [Diarrhea] : no diarrhea [Dysuria] : no dysuria [Incontinence] : no incontinence [Joint Pain] : no joint pain [Joint Stiffness] : no joint stiffness [Dizziness] : no dizziness [Hot Flashes] : no hot flashes [Muscle Weakness] : no muscle weakness [Easy Bleeding] : no tendency for easy bleeding [Easy Bruising] : no tendency for easy bruising

## 2022-12-30 NOTE — REASON FOR VISIT
[Follow-Up Visit] : a follow-up [Spouse] : spouse [FreeTextEntry2] : upper tract urothelial cancer and renal cancer

## 2022-12-30 NOTE — PHYSICAL EXAM
[Restricted in physically strenuous activity but ambulatory and able to carry out work of a light or sedentary nature] : Status 1- Restricted in physically strenuous activity but ambulatory and able to carry out work of a light or sedentary nature, e.g., light house work, office work [Normal] : affect appropriate [de-identified] : anicteric  [de-identified] : trace edema of bilateral ankles, trace edema of left lower tibia is reportedly stable  [de-identified] : ambulates with a walker [de-identified] : slight blanching erythema of mid to low back and posterior thighs/knees. Erythema of right groin area with noticeable moisture trapping [de-identified] : tremors

## 2023-01-04 ENCOUNTER — RESULT REVIEW (OUTPATIENT)
Age: 68
End: 2023-01-04

## 2023-01-04 ENCOUNTER — APPOINTMENT (OUTPATIENT)
Dept: INFUSION THERAPY | Facility: HOSPITAL | Age: 68
End: 2023-01-04

## 2023-01-04 LAB
BASOPHILS # BLD AUTO: 0.16 K/UL — SIGNIFICANT CHANGE UP (ref 0–0.2)
BASOPHILS NFR BLD AUTO: 1.9 % — SIGNIFICANT CHANGE UP (ref 0–2)
EOSINOPHIL # BLD AUTO: 0.52 K/UL — HIGH (ref 0–0.5)
EOSINOPHIL NFR BLD AUTO: 6.1 % — HIGH (ref 0–6)
HCT VFR BLD CALC: 33.9 % — LOW (ref 39–50)
HGB BLD-MCNC: 10.9 G/DL — LOW (ref 13–17)
IMM GRANULOCYTES NFR BLD AUTO: 2.1 % — HIGH (ref 0–0.9)
LYMPHOCYTES # BLD AUTO: 1.62 K/UL — SIGNIFICANT CHANGE UP (ref 1–3.3)
LYMPHOCYTES # BLD AUTO: 19 % — SIGNIFICANT CHANGE UP (ref 13–44)
MAGNESIUM SERPL-MCNC: 1.4 MG/DL — LOW (ref 1.6–2.6)
MCHC RBC-ENTMCNC: 27.2 PG — SIGNIFICANT CHANGE UP (ref 27–34)
MCHC RBC-ENTMCNC: 32.2 G/DL — SIGNIFICANT CHANGE UP (ref 32–36)
MCV RBC AUTO: 84.5 FL — SIGNIFICANT CHANGE UP (ref 80–100)
MONOCYTES # BLD AUTO: 1.28 K/UL — HIGH (ref 0–0.9)
MONOCYTES NFR BLD AUTO: 15 % — HIGH (ref 2–14)
NEUTROPHILS # BLD AUTO: 4.77 K/UL — SIGNIFICANT CHANGE UP (ref 1.8–7.4)
NEUTROPHILS NFR BLD AUTO: 55.9 % — SIGNIFICANT CHANGE UP (ref 43–77)
NRBC # BLD: 0 /100 WBCS — SIGNIFICANT CHANGE UP (ref 0–0)
PLATELET # BLD AUTO: 472 K/UL — HIGH (ref 150–400)
RBC # BLD: 4.01 M/UL — LOW (ref 4.2–5.8)
RBC # FLD: 14.3 % — SIGNIFICANT CHANGE UP (ref 10.3–14.5)
WBC # BLD: 8.53 K/UL — SIGNIFICANT CHANGE UP (ref 3.8–10.5)
WBC # FLD AUTO: 8.53 K/UL — SIGNIFICANT CHANGE UP (ref 3.8–10.5)

## 2023-01-11 ENCOUNTER — RESULT REVIEW (OUTPATIENT)
Age: 68
End: 2023-01-11

## 2023-01-11 ENCOUNTER — APPOINTMENT (OUTPATIENT)
Dept: INFUSION THERAPY | Facility: HOSPITAL | Age: 68
End: 2023-01-11

## 2023-01-11 LAB
BASOPHILS # BLD AUTO: 0.03 K/UL — SIGNIFICANT CHANGE UP (ref 0–0.2)
BASOPHILS NFR BLD AUTO: 0.3 % — SIGNIFICANT CHANGE UP (ref 0–2)
EOSINOPHIL # BLD AUTO: 0.03 K/UL — SIGNIFICANT CHANGE UP (ref 0–0.5)
EOSINOPHIL NFR BLD AUTO: 0.3 % — SIGNIFICANT CHANGE UP (ref 0–6)
HCT VFR BLD CALC: 34.5 % — LOW (ref 39–50)
HGB BLD-MCNC: 11.5 G/DL — LOW (ref 13–17)
IMM GRANULOCYTES NFR BLD AUTO: 2.1 % — HIGH (ref 0–0.9)
LYMPHOCYTES # BLD AUTO: 1.51 K/UL — SIGNIFICANT CHANGE UP (ref 1–3.3)
LYMPHOCYTES # BLD AUTO: 14.6 % — SIGNIFICANT CHANGE UP (ref 13–44)
MCHC RBC-ENTMCNC: 27.4 PG — SIGNIFICANT CHANGE UP (ref 27–34)
MCHC RBC-ENTMCNC: 33.3 G/DL — SIGNIFICANT CHANGE UP (ref 32–36)
MCV RBC AUTO: 82.1 FL — SIGNIFICANT CHANGE UP (ref 80–100)
MONOCYTES # BLD AUTO: 0.84 K/UL — SIGNIFICANT CHANGE UP (ref 0–0.9)
MONOCYTES NFR BLD AUTO: 8.1 % — SIGNIFICANT CHANGE UP (ref 2–14)
NEUTROPHILS # BLD AUTO: 7.72 K/UL — HIGH (ref 1.8–7.4)
NEUTROPHILS NFR BLD AUTO: 74.6 % — SIGNIFICANT CHANGE UP (ref 43–77)
NRBC # BLD: 0 /100 WBCS — SIGNIFICANT CHANGE UP (ref 0–0)
PLATELET # BLD AUTO: 220 K/UL — SIGNIFICANT CHANGE UP (ref 150–400)
RBC # BLD: 4.2 M/UL — SIGNIFICANT CHANGE UP (ref 4.2–5.8)
RBC # FLD: 14.4 % — SIGNIFICANT CHANGE UP (ref 10.3–14.5)
WBC # BLD: 10.35 K/UL — SIGNIFICANT CHANGE UP (ref 3.8–10.5)
WBC # FLD AUTO: 10.35 K/UL — SIGNIFICANT CHANGE UP (ref 3.8–10.5)

## 2023-01-13 PROBLEM — R21 RASH: Status: ACTIVE | Noted: 2022-12-23

## 2023-01-17 ENCOUNTER — RESULT REVIEW (OUTPATIENT)
Age: 68
End: 2023-01-17

## 2023-01-17 ENCOUNTER — APPOINTMENT (OUTPATIENT)
Dept: HEMATOLOGY ONCOLOGY | Facility: CLINIC | Age: 68
End: 2023-01-17
Payer: MEDICARE

## 2023-01-17 VITALS
BODY MASS INDEX: 38.96 KG/M2 | TEMPERATURE: 97.7 F | OXYGEN SATURATION: 96 % | HEART RATE: 78 BPM | WEIGHT: 270.93 LBS | SYSTOLIC BLOOD PRESSURE: 119 MMHG | RESPIRATION RATE: 16 BRPM | DIASTOLIC BLOOD PRESSURE: 71 MMHG

## 2023-01-17 DIAGNOSIS — R21 RASH AND OTHER NONSPECIFIC SKIN ERUPTION: ICD-10-CM

## 2023-01-17 DIAGNOSIS — D69.59 OTHER SECONDARY THROMBOCYTOPENIA: ICD-10-CM

## 2023-01-17 DIAGNOSIS — T45.1X5A OTHER SECONDARY THROMBOCYTOPENIA: ICD-10-CM

## 2023-01-17 LAB
BASOPHILS # BLD AUTO: 0.02 K/UL — SIGNIFICANT CHANGE UP (ref 0–0.2)
BASOPHILS NFR BLD AUTO: 0.4 % — SIGNIFICANT CHANGE UP (ref 0–2)
EOSINOPHIL # BLD AUTO: 0.03 K/UL — SIGNIFICANT CHANGE UP (ref 0–0.5)
EOSINOPHIL NFR BLD AUTO: 0.6 % — SIGNIFICANT CHANGE UP (ref 0–6)
HCT VFR BLD CALC: 31.9 % — LOW (ref 39–50)
HGB BLD-MCNC: 10.4 G/DL — LOW (ref 13–17)
IMM GRANULOCYTES NFR BLD AUTO: 1.2 % — HIGH (ref 0–0.9)
LYMPHOCYTES # BLD AUTO: 1.41 K/UL — SIGNIFICANT CHANGE UP (ref 1–3.3)
LYMPHOCYTES # BLD AUTO: 28.2 % — SIGNIFICANT CHANGE UP (ref 13–44)
MCHC RBC-ENTMCNC: 27.5 PG — SIGNIFICANT CHANGE UP (ref 27–34)
MCHC RBC-ENTMCNC: 32.6 G/DL — SIGNIFICANT CHANGE UP (ref 32–36)
MCV RBC AUTO: 84.4 FL — SIGNIFICANT CHANGE UP (ref 80–100)
MONOCYTES # BLD AUTO: 0.54 K/UL — SIGNIFICANT CHANGE UP (ref 0–0.9)
MONOCYTES NFR BLD AUTO: 10.8 % — SIGNIFICANT CHANGE UP (ref 2–14)
NEUTROPHILS # BLD AUTO: 2.94 K/UL — SIGNIFICANT CHANGE UP (ref 1.8–7.4)
NEUTROPHILS NFR BLD AUTO: 58.8 % — SIGNIFICANT CHANGE UP (ref 43–77)
NRBC # BLD: 0 /100 WBCS — SIGNIFICANT CHANGE UP (ref 0–0)
PLATELET # BLD AUTO: 96 K/UL — LOW (ref 150–400)
RBC # BLD: 3.78 M/UL — LOW (ref 4.2–5.8)
RBC # FLD: 14.4 % — SIGNIFICANT CHANGE UP (ref 10.3–14.5)
WBC # BLD: 5 K/UL — SIGNIFICANT CHANGE UP (ref 3.8–10.5)
WBC # FLD AUTO: 5 K/UL — SIGNIFICANT CHANGE UP (ref 3.8–10.5)

## 2023-01-17 PROCEDURE — 99213 OFFICE O/P EST LOW 20 MIN: CPT

## 2023-01-17 RX ORDER — ASPIRIN 81 MG/1
81 TABLET, CHEWABLE ORAL
Refills: 0 | Status: DISCONTINUED | COMMUNITY
End: 2023-01-17

## 2023-01-17 RX ORDER — BENZONATATE 100 MG/1
100 CAPSULE ORAL 3 TIMES DAILY
Qty: 60 | Refills: 0 | Status: DISCONTINUED | COMMUNITY
Start: 2022-12-23 | End: 2023-01-17

## 2023-01-18 ENCOUNTER — NON-APPOINTMENT (OUTPATIENT)
Age: 68
End: 2023-01-18

## 2023-01-18 LAB
ALBUMIN SERPL ELPH-MCNC: 3.9 G/DL
ALP BLD-CCNC: 76 U/L
ALT SERPL-CCNC: 15 U/L
ANION GAP SERPL CALC-SCNC: 12 MMOL/L
AST SERPL-CCNC: 13 U/L
BILIRUB SERPL-MCNC: 0.2 MG/DL
BUN SERPL-MCNC: 19 MG/DL
CALCIUM SERPL-MCNC: 8.8 MG/DL
CHLORIDE SERPL-SCNC: 101 MMOL/L
CO2 SERPL-SCNC: 26 MMOL/L
CREAT SERPL-MCNC: 1.06 MG/DL
EGFR: 77 ML/MIN/1.73M2
GLUCOSE SERPL-MCNC: 102 MG/DL
MAGNESIUM SERPL-MCNC: 1.2 MG/DL
POTASSIUM SERPL-SCNC: 3.9 MMOL/L
PROT SERPL-MCNC: 6.8 G/DL
SODIUM SERPL-SCNC: 139 MMOL/L

## 2023-01-20 ENCOUNTER — APPOINTMENT (OUTPATIENT)
Dept: NUCLEAR MEDICINE | Facility: IMAGING CENTER | Age: 68
End: 2023-01-20
Payer: MEDICARE

## 2023-01-20 ENCOUNTER — RESULT REVIEW (OUTPATIENT)
Age: 68
End: 2023-01-20

## 2023-01-20 ENCOUNTER — OUTPATIENT (OUTPATIENT)
Dept: OUTPATIENT SERVICES | Facility: HOSPITAL | Age: 68
LOS: 1 days | End: 2023-01-20
Payer: MEDICARE

## 2023-01-20 DIAGNOSIS — Z98.890 OTHER SPECIFIED POSTPROCEDURAL STATES: Chronic | ICD-10-CM

## 2023-01-20 DIAGNOSIS — Z96.649 PRESENCE OF UNSPECIFIED ARTIFICIAL HIP JOINT: Chronic | ICD-10-CM

## 2023-01-20 DIAGNOSIS — Z90.49 ACQUIRED ABSENCE OF OTHER SPECIFIED PARTS OF DIGESTIVE TRACT: Chronic | ICD-10-CM

## 2023-01-20 DIAGNOSIS — N28.89 OTHER SPECIFIED DISORDERS OF KIDNEY AND URETER: ICD-10-CM

## 2023-01-20 PROCEDURE — 78830 RP LOCLZJ TUM SPECT W/CT 1: CPT | Mod: MH

## 2023-01-20 PROCEDURE — A9500: CPT

## 2023-01-20 PROCEDURE — 78830 RP LOCLZJ TUM SPECT W/CT 1: CPT | Mod: 26,MH

## 2023-01-20 NOTE — ASSESSMENT
[Palliative Care Plan] : not applicable at this time [FreeTextEntry1] : Regis Juan is seen today for f/u of upper tract urothelial carcinoma. Cisplatin + gemcitabine started 12/13/22. \par \par Urothelial carcinoma:\par - Cisplatin + gemcitabine cycle 2 day 14 today. Tolerating well with no significant AEs. \par - Proceed with cycle 3 on 1/23/23 pending CBC. \par \par Cytopenias:\par - Anemia - Hgb = 10.4 today, stable. \par - Thrombocytopenia - PLT = 96 today, continue to monitor. \par \par Rash:\par - Rash on backside has resolved. 2 days ago he developed a small erythematous and pruritic rash on his left inner wrist area. \par - Possibly 2/2 gemcitabine. Continue hydrocortisone to area bid and contact the office if symptoms worsen. \par \par Hypomagnesemia:\par - 1/4/23 Mg = 1.4 \par - Continues on MgOx 400mg tid\par - Repeat Mg today.\par \par Hypokalemia:\par - 12/19/22 K = 3.3, started KCL 20meq daily\par - 12/29/22 K = 4.3\par - Continue KCL 20meq daily\par \par Instructed to contact our office with any new/worsening symptoms.\par Pt and wife educated regarding plan of care, all questions/concerns addressed to the best of my abilities and their apparent satisfaction.\par RTC 1/23/23 cis/gem cycle 3 day 1\par RTC 1/30/23 cis/gem cycle 3 day 8\par F/u with provider in 3wks

## 2023-01-20 NOTE — PHYSICAL EXAM
[Restricted in physically strenuous activity but ambulatory and able to carry out work of a light or sedentary nature] : Status 1- Restricted in physically strenuous activity but ambulatory and able to carry out work of a light or sedentary nature, e.g., light house work, office work [Normal] : affect appropriate [de-identified] : anicteric [de-identified] : +1 left ankle edema [de-identified] : ambulates with a walker [de-identified] : small erythematous patch on left inner wrist [de-identified] : hand tremors 2/2 Parkinsons

## 2023-01-20 NOTE — HISTORY OF PRESENT ILLNESS
[de-identified] : Regis Mcintosh is seen in consultation on November 28, 2022.  He was referred for evaluation of bilateral renal masses.  2 weeks prior, he was experiencing gross hematuria which was intermittent.  There were no clots.  He had no flank pain.  He did notes some "tightness" on the left side, ? to Parkinson's?.  He has a history of stones in the past.  CT was done as an a outpatient at [facility].  A mass on the right kidney extends into the sinus near the division of the right renal artery.  The imaging was most compatible with renal cell carcinoma.  The left kidney revealed a hydronephrotic pelvis.  There was hyperdense material on the noncontrast CT scan, and it partially enhances with contrast.  There were some enlarged nodes on the left side.  A CT scan of the chest was negative.  He was seen by Dr. Hernandez on November 23, and he was concerned that the left likely represents urothelial carcinoma with the associated lymphadenopathy.  The plan is for ureteroscopy and biopsy with a JJ stent, scheduled for December 1, 2022 .  His creatinine was 0.9.  He is referred for consideration of the possibility of neoadjuvant chemotherapy.  I spoke with Dr. Hernandez after the patient was evaluated by him.  He feels that the right side kidney mass might be amenable to a partial nephrectomy.\par \par He was initially evaluated by Dr. Sean Jurado.  He had a PSA elevation in September 2020 at 4.76.  In 2018 his PSA was 1.9.  In November 2021, his PSA was 3.88.  In mid November he had an Enterococcus faecalis urinary tract infection and was treated.  A CT urogram was performed revealing bilateral renal masses concerning for malignant etiologies.  The right renal mass measured up to 7.1 cm and was concerning for renal cell carcinoma.  It was endophytic.  There were subtle features present raising concern for possible early vascular and urinary collecting system invasion.  The left renal pelvis mass measured 7.2 cm and had the radiologic features of urothelial carcinoma.  The mass was enhancing.  Retroperitoneal adenopathy was present.  The bladder was partly obscured due to right hip arthroplasty artifact.  No definite bladder lesion was identified.  There was a nonobstructing 0.5 cm right sided ureteral stone located distally.  The report says that there was stable lower chest findings including mildly enlarged lower mediastinal lymph node and a tiny lung base nodule.  These findings were stable from a CT scan 2 years ago.\par \par There was new para-aortic lymphadenopathy compared to the prior scan from 2 years ago.  There is a left para-aortic lymph node at the level of the renal vasculature measuring 2.1 x 1.5 cm.  An anterior periaortic lymph node at the level of the LEONEL was 2.1 x 1.5 cm.\par Cystoscopy was performed on November 21 by Dr. Jurado.  Urethra was normal.  The prostatic urethra demonstrated obstruction due to lateral lobe enlargement of the prostate.  The bladder was normal.  Both ureteral orifices revealed clear E flux of urine.  No tumors were noted within the bladder.\par \par Overall, he feels "fine". He has minor aches and pains, present for a long time. He has some difficulty walking, can't do long distance, standing in one spot is an issue, diagnosed with the Parkinson's in 2010. Had DBS placed 3 years ago. He says that helped his symptoms a lot. NO headaches, no dizziness, some balance issues. No falls except once this summer, while trying to get out of the pool. Appetite has been normal, no weight loss. Occ swelling of the legs, no h/o DVT. No chest pain/pressure/palpitations. No cough, no SALDANA. Denies fatigue. Works daily, goes to PT once weekly, and home PT once a week in addition. Rides stationary bike a few times a week. No prior h/o hematuria. \par \par 12/19/22...started cis/gem on 12/13/22, day 7, cycle 1. Feels "good". He had no fatigue until 2 days ago, started 12/17, remains fatigued. Less active. Brief naps, not sleeping well at night. This has been the case since the ureteral stent. Flow is good, daytime frequency, nocturia x 3-4. No urgency. No incontinence. Occ blood, no clots. Some cough no SALDANA. NO fevers, no chills. No N/V/D, some constipation, on senna, Colace and MiraLAX. No headaches, no dizziness. Balance issues due to Parkinson's, uses rollator outside, but not in the house. No recent falls. No edema. No chest pain/pressure/palpitations. URI, with cough, using Robitussin\par \par 12/29/22 - cisplatin/gemcitabine cycle 1 day 17 today. Overall feeling fine, some fatigue. Last week rash started in groin area, he used nystatin cream bid with improvement in the  groin area but noticed new rash on his low back all the way down his backside to posterior knees. He used hydrocortisone cream bid which was helpful. Appetite has been decreased but reports adequate PO intake, weight is up 14lbs since last week. Occasional tinnitus of left ear lasting <15. Ongoing productive cough for >2wks, slowly improving. Trace edema of bilateral ankles is stable since before start of chemo. Stable low back pain 2/2 parkinsons. Denies fever, chills, night sweats, hot flashes, headache, dizziness, balance issues, eye pain/problems, mucositis/odynophagia, chest pain, palpitations, SOB, nausea/vomiting, diarrhea/constipation, abdominal pain, dysuria, hematuria, incontinence, neuropathy, bleeding.  [FreeTextEntry1] : 12/13/22....cis/gem started [de-identified] : 1/17/23 - cis/gem cycle 2 day 14 today. Overall feeling well, notes ongoing fatigue especially after treatment. Occasional transient tinnitus. Appetite waxes and wanes but reports adequate PO intake, reports adequate hydration. Urine flow is "ok", rare urgency, notes some dribbling at the end of the urine stream, nocturia 2-3x/night but sometimes more frequently. Rash on the backside has resolved, approx 2 days ago he developed a small red pruritic patch on the left forearm, he started using hydrocortisone cream today. Occasional lower back pain. Denies fever, chills, night sweats, hot flashes, headache, dizziness, balance issues, eye pain/problems, mucositis/odynophagia, dysgeusia, chest pain, palpitations, SOB, cough, nausea/vomiting, diarrhea/constipation, abdominal pain, dysuria, hematuria, incontinence, LE edema, neuropathy, bleeding, weakness. \par

## 2023-01-20 NOTE — REVIEW OF SYSTEMS
[Fatigue] : fatigue [Muscle Pain] : muscle pain [Skin Rash] : skin rash [Fever] : no fever [Chills] : no chills [Night Sweats] : no night sweats [Eye Pain] : no eye pain [Vision Problems] : no vision problems [Dysphagia] : no dysphagia [Loss of Hearing] : no loss of hearing [Odynophagia] : no odynophagia [Mucosal Pain] : no mucosal pain [Chest Pain] : no chest pain [Palpitations] : no palpitations [Lower Ext Edema] : no lower extremity edema [Shortness Of Breath] : no shortness of breath [Cough] : no cough [Abdominal Pain] : no abdominal pain [Vomiting] : no vomiting [Constipation] : no constipation [Diarrhea] : no diarrhea [Dysuria] : no dysuria [Incontinence] : no incontinence [Joint Pain] : no joint pain [Joint Stiffness] : no joint stiffness [Dizziness] : no dizziness [Hot Flashes] : no hot flashes [Muscle Weakness] : no muscle weakness [Easy Bleeding] : no tendency for easy bleeding [Easy Bruising] : no tendency for easy bruising

## 2023-01-21 ENCOUNTER — APPOINTMENT (OUTPATIENT)
Dept: INFUSION THERAPY | Facility: HOSPITAL | Age: 68
End: 2023-01-21

## 2023-01-23 ENCOUNTER — RESULT REVIEW (OUTPATIENT)
Age: 68
End: 2023-01-23

## 2023-01-23 ENCOUNTER — OUTPATIENT (OUTPATIENT)
Dept: OUTPATIENT SERVICES | Facility: HOSPITAL | Age: 68
LOS: 1 days | Discharge: HOME | End: 2023-01-23

## 2023-01-23 ENCOUNTER — LABORATORY RESULT (OUTPATIENT)
Age: 68
End: 2023-01-23

## 2023-01-23 ENCOUNTER — NON-APPOINTMENT (OUTPATIENT)
Age: 68
End: 2023-01-23

## 2023-01-23 ENCOUNTER — APPOINTMENT (OUTPATIENT)
Dept: INFUSION THERAPY | Facility: HOSPITAL | Age: 68
End: 2023-01-23

## 2023-01-23 DIAGNOSIS — Z90.49 ACQUIRED ABSENCE OF OTHER SPECIFIED PARTS OF DIGESTIVE TRACT: Chronic | ICD-10-CM

## 2023-01-23 DIAGNOSIS — E86.0 DEHYDRATION: ICD-10-CM

## 2023-01-23 DIAGNOSIS — C64.9 MALIGNANT NEOPLASM OF UNSPECIFIED KIDNEY, EXCEPT RENAL PELVIS: ICD-10-CM

## 2023-01-23 DIAGNOSIS — Z51.11 ENCOUNTER FOR ANTINEOPLASTIC CHEMOTHERAPY: ICD-10-CM

## 2023-01-23 DIAGNOSIS — Z96.649 PRESENCE OF UNSPECIFIED ARTIFICIAL HIP JOINT: Chronic | ICD-10-CM

## 2023-01-23 DIAGNOSIS — N28.89 OTHER SPECIFIED DISORDERS OF KIDNEY AND URETER: ICD-10-CM

## 2023-01-23 DIAGNOSIS — Z98.890 OTHER SPECIFIED POSTPROCEDURAL STATES: Chronic | ICD-10-CM

## 2023-01-23 DIAGNOSIS — R11.2 NAUSEA WITH VOMITING, UNSPECIFIED: ICD-10-CM

## 2023-01-23 LAB
BASOPHILS # BLD AUTO: 0.04 K/UL — SIGNIFICANT CHANGE UP (ref 0–0.2)
BASOPHILS NFR BLD AUTO: 0.6 % — SIGNIFICANT CHANGE UP (ref 0–2)
EOSINOPHIL # BLD AUTO: 0.2 K/UL — SIGNIFICANT CHANGE UP (ref 0–0.5)
EOSINOPHIL NFR BLD AUTO: 2.8 % — SIGNIFICANT CHANGE UP (ref 0–6)
HCT VFR BLD CALC: 32.1 % — LOW (ref 39–50)
HGB BLD-MCNC: 10.6 G/DL — LOW (ref 13–17)
IMM GRANULOCYTES NFR BLD AUTO: 0.7 % — SIGNIFICANT CHANGE UP (ref 0–0.9)
LYMPHOCYTES # BLD AUTO: 1.22 K/UL — SIGNIFICANT CHANGE UP (ref 1–3.3)
LYMPHOCYTES # BLD AUTO: 16.9 % — SIGNIFICANT CHANGE UP (ref 13–44)
MCHC RBC-ENTMCNC: 27.8 PG — SIGNIFICANT CHANGE UP (ref 27–34)
MCHC RBC-ENTMCNC: 33 G/DL — SIGNIFICANT CHANGE UP (ref 32–36)
MCV RBC AUTO: 84.3 FL — SIGNIFICANT CHANGE UP (ref 80–100)
MONOCYTES # BLD AUTO: 0.95 K/UL — HIGH (ref 0–0.9)
MONOCYTES NFR BLD AUTO: 13.2 % — SIGNIFICANT CHANGE UP (ref 2–14)
NEUTROPHILS # BLD AUTO: 4.75 K/UL — SIGNIFICANT CHANGE UP (ref 1.8–7.4)
NEUTROPHILS NFR BLD AUTO: 65.8 % — SIGNIFICANT CHANGE UP (ref 43–77)
NRBC # BLD: 0 /100 WBCS — SIGNIFICANT CHANGE UP (ref 0–0)
PLATELET # BLD AUTO: 389 K/UL — SIGNIFICANT CHANGE UP (ref 150–400)
RBC # BLD: 3.81 M/UL — LOW (ref 4.2–5.8)
RBC # FLD: 15.1 % — HIGH (ref 10.3–14.5)
WBC # BLD: 7.21 K/UL — SIGNIFICANT CHANGE UP (ref 3.8–10.5)
WBC # FLD AUTO: 7.21 K/UL — SIGNIFICANT CHANGE UP (ref 3.8–10.5)

## 2023-01-24 ENCOUNTER — NON-APPOINTMENT (OUTPATIENT)
Age: 68
End: 2023-01-24

## 2023-01-30 ENCOUNTER — RESULT REVIEW (OUTPATIENT)
Age: 68
End: 2023-01-30

## 2023-01-30 ENCOUNTER — APPOINTMENT (OUTPATIENT)
Dept: INFUSION THERAPY | Facility: HOSPITAL | Age: 68
End: 2023-01-30

## 2023-01-30 ENCOUNTER — NON-APPOINTMENT (OUTPATIENT)
Age: 68
End: 2023-01-30

## 2023-01-30 LAB
BASOPHILS # BLD AUTO: 0 K/UL — SIGNIFICANT CHANGE UP (ref 0–0.2)
BASOPHILS NFR BLD AUTO: 0 % — SIGNIFICANT CHANGE UP (ref 0–2)
EOSINOPHIL # BLD AUTO: 0.08 K/UL — SIGNIFICANT CHANGE UP (ref 0–0.5)
EOSINOPHIL NFR BLD AUTO: 1 % — SIGNIFICANT CHANGE UP (ref 0–6)
HCT VFR BLD CALC: 32.7 % — LOW (ref 39–50)
HGB BLD-MCNC: 11.1 G/DL — LOW (ref 13–17)
LYMPHOCYTES # BLD AUTO: 0.91 K/UL — LOW (ref 1–3.3)
LYMPHOCYTES # BLD AUTO: 11 % — LOW (ref 13–44)
MAGNESIUM SERPL-MCNC: 1.1 MG/DL — LOW (ref 1.6–2.6)
MCHC RBC-ENTMCNC: 28.1 PG — SIGNIFICANT CHANGE UP (ref 27–34)
MCHC RBC-ENTMCNC: 33.9 G/DL — SIGNIFICANT CHANGE UP (ref 32–36)
MCV RBC AUTO: 82.8 FL — SIGNIFICANT CHANGE UP (ref 80–100)
METAMYELOCYTES # FLD: 3 % — HIGH (ref 0–0)
MONOCYTES # BLD AUTO: 0.75 K/UL — SIGNIFICANT CHANGE UP (ref 0–0.9)
MONOCYTES NFR BLD AUTO: 9 % — SIGNIFICANT CHANGE UP (ref 2–14)
MYELOCYTES NFR BLD: 1 % — HIGH (ref 0–0)
NEUTROPHILS # BLD AUTO: 6.23 K/UL — SIGNIFICANT CHANGE UP (ref 1.8–7.4)
NEUTROPHILS NFR BLD AUTO: 75 % — SIGNIFICANT CHANGE UP (ref 43–77)
NRBC # BLD: 0 /100 — SIGNIFICANT CHANGE UP (ref 0–0)
NRBC # BLD: SIGNIFICANT CHANGE UP /100 WBCS (ref 0–0)
PLAT MORPH BLD: NORMAL — SIGNIFICANT CHANGE UP
PLATELET # BLD AUTO: 234 K/UL — SIGNIFICANT CHANGE UP (ref 150–400)
RBC # BLD: 3.95 M/UL — LOW (ref 4.2–5.8)
RBC # FLD: 15.3 % — HIGH (ref 10.3–14.5)
RBC BLD AUTO: SIGNIFICANT CHANGE UP
WBC # BLD: 8.3 K/UL — SIGNIFICANT CHANGE UP (ref 3.8–10.5)
WBC # FLD AUTO: 8.3 K/UL — SIGNIFICANT CHANGE UP (ref 3.8–10.5)

## 2023-02-02 LAB
BASOPHILS # BLD AUTO: 0.03 K/UL
BASOPHILS NFR BLD AUTO: 0.4 %
EOSINOPHIL # BLD AUTO: 0.03 K/UL
EOSINOPHIL NFR BLD AUTO: 0.4 %
HCT VFR BLD CALC: 29.7 %
HGB BLD-MCNC: 9.4 G/DL
IMM GRANULOCYTES NFR BLD AUTO: 0.7 %
LYMPHOCYTES # BLD AUTO: 0.71 K/UL
LYMPHOCYTES NFR BLD AUTO: 10.6 %
MAN DIFF?: NORMAL
MCHC RBC-ENTMCNC: 27.8 PG
MCHC RBC-ENTMCNC: 31.6 GM/DL
MCV RBC AUTO: 87.9 FL
MONOCYTES # BLD AUTO: 0.09 K/UL
MONOCYTES NFR BLD AUTO: 1.3 %
NEUTROPHILS # BLD AUTO: 5.76 K/UL
NEUTROPHILS NFR BLD AUTO: 86.6 %
PLATELET # BLD AUTO: 182 K/UL
RBC # BLD: 3.38 M/UL
RBC # FLD: 15.9 %
WBC # FLD AUTO: 6.67 K/UL

## 2023-02-03 ENCOUNTER — NON-APPOINTMENT (OUTPATIENT)
Age: 68
End: 2023-02-03

## 2023-02-08 ENCOUNTER — APPOINTMENT (OUTPATIENT)
Dept: HEMATOLOGY ONCOLOGY | Facility: CLINIC | Age: 68
End: 2023-02-08
Payer: MEDICARE

## 2023-02-08 ENCOUNTER — RESULT REVIEW (OUTPATIENT)
Age: 68
End: 2023-02-08

## 2023-02-08 VITALS
BODY MASS INDEX: 39.12 KG/M2 | RESPIRATION RATE: 16 BRPM | SYSTOLIC BLOOD PRESSURE: 132 MMHG | OXYGEN SATURATION: 97 % | WEIGHT: 272.05 LBS | TEMPERATURE: 97 F | DIASTOLIC BLOOD PRESSURE: 72 MMHG | HEART RATE: 82 BPM

## 2023-02-08 LAB
ALBUMIN SERPL ELPH-MCNC: 3.9 G/DL
ALP BLD-CCNC: 81 U/L
ALT SERPL-CCNC: 6 U/L
ANION GAP SERPL CALC-SCNC: 14 MMOL/L
AST SERPL-CCNC: 12 U/L
BASOPHILS # BLD AUTO: 0.03 K/UL — SIGNIFICANT CHANGE UP (ref 0–0.2)
BASOPHILS NFR BLD AUTO: 0.5 % — SIGNIFICANT CHANGE UP (ref 0–2)
BILIRUB SERPL-MCNC: 0.3 MG/DL
BUN SERPL-MCNC: 19 MG/DL
CALCIUM SERPL-MCNC: 9.1 MG/DL
CHLORIDE SERPL-SCNC: 104 MMOL/L
CO2 SERPL-SCNC: 23 MMOL/L
CREAT SERPL-MCNC: 0.96 MG/DL
EGFR: 87 ML/MIN/1.73M2
EOSINOPHIL # BLD AUTO: 0.01 K/UL — SIGNIFICANT CHANGE UP (ref 0–0.5)
EOSINOPHIL NFR BLD AUTO: 0.2 % — SIGNIFICANT CHANGE UP (ref 0–6)
GLUCOSE SERPL-MCNC: 109 MG/DL
HCT VFR BLD CALC: 29.5 % — LOW (ref 39–50)
HGB BLD-MCNC: 9.5 G/DL — LOW (ref 13–17)
IMM GRANULOCYTES NFR BLD AUTO: 1.8 % — HIGH (ref 0–0.9)
LYMPHOCYTES # BLD AUTO: 0.95 K/UL — LOW (ref 1–3.3)
LYMPHOCYTES # BLD AUTO: 14.6 % — SIGNIFICANT CHANGE UP (ref 13–44)
MAGNESIUM SERPL-MCNC: 1.2 MG/DL
MCHC RBC-ENTMCNC: 28 PG — SIGNIFICANT CHANGE UP (ref 27–34)
MCHC RBC-ENTMCNC: 32.2 G/DL — SIGNIFICANT CHANGE UP (ref 32–36)
MCV RBC AUTO: 87 FL — SIGNIFICANT CHANGE UP (ref 80–100)
MONOCYTES # BLD AUTO: 1.1 K/UL — HIGH (ref 0–0.9)
MONOCYTES NFR BLD AUTO: 16.9 % — HIGH (ref 2–14)
NEUTROPHILS # BLD AUTO: 4.28 K/UL — SIGNIFICANT CHANGE UP (ref 1.8–7.4)
NEUTROPHILS NFR BLD AUTO: 66 % — SIGNIFICANT CHANGE UP (ref 43–77)
NRBC # BLD: 0 /100 WBCS — SIGNIFICANT CHANGE UP (ref 0–0)
PLATELET # BLD AUTO: 210 K/UL — SIGNIFICANT CHANGE UP (ref 150–400)
POTASSIUM SERPL-SCNC: 4 MMOL/L
PROT SERPL-MCNC: 6.6 G/DL
RBC # BLD: 3.39 M/UL — LOW (ref 4.2–5.8)
RBC # FLD: 16.5 % — HIGH (ref 10.3–14.5)
SODIUM SERPL-SCNC: 141 MMOL/L
WBC # BLD: 6.49 K/UL — SIGNIFICANT CHANGE UP (ref 3.8–10.5)
WBC # FLD AUTO: 6.49 K/UL — SIGNIFICANT CHANGE UP (ref 3.8–10.5)

## 2023-02-08 PROCEDURE — 99214 OFFICE O/P EST MOD 30 MIN: CPT

## 2023-02-08 RX ORDER — HYDROCORTISONE 0.5 G/100G
0.5 CREAM TOPICAL TWICE DAILY
Refills: 0 | Status: DISCONTINUED | COMMUNITY
Start: 2022-12-29 | End: 2023-02-08

## 2023-02-08 RX ORDER — ADHESIVE TAPE 3"X 2.3 YD
50 MCG TAPE, NON-MEDICATED TOPICAL
Refills: 0 | Status: DISCONTINUED | COMMUNITY
End: 2023-02-08

## 2023-02-08 NOTE — REVIEW OF SYSTEMS
[Fatigue] : fatigue [Joint Stiffness] : joint stiffness [Anxiety] : anxiety [Muscle Weakness] : muscle weakness [Negative] : ENT [Fever] : no fever [Chills] : no chills [Recent Change In Weight] : ~T no recent weight change [Chest Pain] : no chest pain [Palpitations] : no palpitations [Lower Ext Edema] : no lower extremity edema [Shortness Of Breath] : no shortness of breath [Cough] : no cough [SOB on Exertion] : no shortness of breath during exertion [Abdominal Pain] : no abdominal pain [Vomiting] : no vomiting [Constipation] : no constipation [Diarrhea] : no diarrhea [Dysuria] : no dysuria [Incontinence] : no incontinence [Joint Pain] : no joint pain [Muscle Pain] : no muscle pain [Skin Rash] : no skin rash [Dizziness] : no dizziness [Depression] : no depression [Easy Bleeding] : no tendency for easy bleeding [Easy Bruising] : no tendency for easy bruising [FreeTextEntry9] : no cramps [de-identified] : some pruritus [de-identified] : No HA, no paresthesias, uses walker for long distances, no falls

## 2023-02-08 NOTE — PHYSICAL EXAM
[Restricted in physically strenuous activity but ambulatory and able to carry out work of a light or sedentary nature] : Status 1- Restricted in physically strenuous activity but ambulatory and able to carry out work of a light or sedentary nature, e.g., light house work, office work [Normal] : affect appropriate [de-identified] : no edema [de-identified] : tremors

## 2023-02-08 NOTE — ASSESSMENT
[Palliative Care Plan] : not applicable at this time [FreeTextEntry1] : Regis Juan is seen in the office today in follow-up, accompanied by his wife.  Today is cis/gem cycle #3, day #14.  He feels somewhat tired and lethargic over the course of this past week.  Has been getting up every 2 hours to void and sometimes has difficulty getting back to sleep, but his wife says that he does not seem to have difficulty getting back to sleep.  He takes melatonin chewables at times, but he ran out.  He is not sure if it helps.  He does not sleep during the day.  He works during the day.  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  The fatigue is more on the cycle.  His appetite is variable.  There is no nausea vomiting diarrhea or constipation.  He is taking MiraLAX and senna.  He had dysgeusia only on 1 day.  He has some intermittent tinnitus.  There were no paresthesias of the feet.  He denies any fevers or chills.  There is no cough or shortness of breath.  His urinary flow is "good, not strong".  It remains the same.  Nocturia every 2 hours as mentioned above.  There is no hesitancy.  There is rare urgency.  There is no incontinence.  He has no hematuria or dysuria.  There is no edema.  There is no chest pain chest pressure or palpitations.  He says that the chemotherapy experience has been "better than he thought it would be".  His magnesium has been low, and he is now taking 4 tablets of magnesium per day.  He had some IV magnesium last week.  He uses a rolling walker for long distances.  There were no falls.  He ambulates within the house and at work without any cane or walker.  He does admit to some anxiety.  He does have some muscle weakness.  He notes that his parkinsonian tremors are a bit more pronounced.\par \par On physical examination, he appears well and in no acute distress.  His performance status is 1.  Blood pressure was 132/72.  His weight is 123.4 kg and this has been more or less in range over the course of the last 6 determinations or so.  He does have some fluctuation in his weight.  The oral mucous membranes are normal.  The neck examination is normal.  The lungs are clear and the heart examination is normal.  There is no edema of the extremities.  The abdominal examination is normal.  There is no spinal column or chest wall tenderness to palpation or percussion.\par \par Today's CBC reveals a white blood cell count of 6.5.  The hemoglobin was 9.5 g.  It was 10.5 g 1 month ago.  His hemoglobin was 12.2-12.5 at the initiation of treatment.\par \par He has never had a low white blood cell count, and his platelet count has not been low other than on 1 occasion on January 17, with a value of 96.  On January 30, his magnesium level was 1.1 and he was given intravenous magnesium that day.  His magnesium and chemistry results from today have not been reported as yet.\par \par All questions were answered to the best of my ability and in to his apparent satisfaction.  If he did not tolerate chemotherapy, I was going to do a CT scan after 3 cycles.  He is tolerating it well, and will go on to cycle #4 as shared decision making.  A CAT scan will be done 2 weeks after the completion of cycle #4.  All questions were answered to the best of my ability and to their apparent satisfaction.  His wife asked about the need for genetic counseling given his kidney cancer, and I explained that we have not removed the kidney yet and therefore this discussion should be tabled until after the kidney surgery occurs.\par \par

## 2023-02-08 NOTE — HISTORY OF PRESENT ILLNESS
[Disease: _____________________] : Disease: [unfilled] [T: ___] : T[unfilled] [N: ___] : N[unfilled] [de-identified] : Regis Mcintosh is seen in consultation on November 28, 2022.  He was referred for evaluation of bilateral renal masses.  2 weeks prior, he was experiencing gross hematuria which was intermittent.  There were no clots.  He had no flank pain.  He did notes some "tightness" on the left side, ? to Parkinson's?.  He has a history of stones in the past.  CT was done as an a outpatient at [facility].  A mass on the right kidney extends into the sinus near the division of the right renal artery.  The imaging was most compatible with renal cell carcinoma.  The left kidney revealed a hydronephrotic pelvis.  There was hyperdense material on the noncontrast CT scan, and it partially enhances with contrast.  There were some enlarged nodes on the left side.  A CT scan of the chest was negative.  He was seen by Dr. Hernandez on November 23, and he was concerned that the left likely represents urothelial carcinoma with the associated lymphadenopathy.  The plan is for ureteroscopy and biopsy with a JJ stent, scheduled for December 1, 2022 .  His creatinine was 0.9.  He is referred for consideration of the possibility of neoadjuvant chemotherapy.  I spoke with Dr. Hernandez after the patient was evaluated by him.  He feels that the right side kidney mass might be amenable to a partial nephrectomy.\par \par He was initially evaluated by Dr. Sean Jurado.  He had a PSA elevation in September 2020 at 4.76.  In 2018 his PSA was 1.9.  In November 2021, his PSA was 3.88.  In mid November he had an Enterococcus faecalis urinary tract infection and was treated.  A CT urogram was performed revealing bilateral renal masses concerning for malignant etiologies.  The right renal mass measured up to 7.1 cm and was concerning for renal cell carcinoma.  It was endophytic.  There were subtle features present raising concern for possible early vascular and urinary collecting system invasion.  The left renal pelvis mass measured 7.2 cm and had the radiologic features of urothelial carcinoma.  The mass was enhancing.  Retroperitoneal adenopathy was present.  The bladder was partly obscured due to right hip arthroplasty artifact.  No definite bladder lesion was identified.  There was a nonobstructing 0.5 cm right sided ureteral stone located distally.  The report says that there was stable lower chest findings including mildly enlarged lower mediastinal lymph node and a tiny lung base nodule.  These findings were stable from a CT scan 2 years ago.\par \par There was new para-aortic lymphadenopathy compared to the prior scan from 2 years ago.  There is a left para-aortic lymph node at the level of the renal vasculature measuring 2.1 x 1.5 cm.  An anterior periaortic lymph node at the level of the LEONEL was 2.1 x 1.5 cm.\par Cystoscopy was performed on November 21 by Dr. Jurado.  Urethra was normal.  The prostatic urethra demonstrated obstruction due to lateral lobe enlargement of the prostate.  The bladder was normal.  Both ureteral orifices revealed clear E flux of urine.  No tumors were noted within the bladder.\par \par Overall, he feels "fine". He has minor aches and pains, present for a long time. He has some difficulty walking, can't do long distance, standing in one spot is an issue, diagnosed with the Parkinson's in 2010. Had DBS placed 3 years ago. He says that helped his symptoms a lot. NO headaches, no dizziness, some balance issues. No falls except once this summer, while trying to get out of the pool. Appetite has been normal, no weight loss. Occ swelling of the legs, no h/o DVT. No chest pain/pressure/palpitations. No cough, no SALDANA. Denies fatigue. Works daily, goes to PT once weekly, and home PT once a week in addition. Rides stationary bike a few times a week. No prior h/o hematuria. \par \par 12/19/22...started cis/gem on 12/13/22, day 7, cycle 1. Feels "good". He had no fatigue until 2 days ago, started 12/17, remains fatigued. Less active. Brief naps, not sleeping well at night. This has been the case since the ureteral stent. Flow is good, daytime frequency, nocturia x 3-4. No urgency. No incontinence. Occ blood, no clots. Some cough no SALDANA. NO fevers, no chills. No N/V/D, some constipation, on senna, Colace and MiraLAX. No headaches, no dizziness. Balance issues due to Parkinson's, uses rollator outside, but not in the house. No recent falls. No edema. No chest pain/pressure/palpitations. URI, with cough, using Robitussin\par \par 12/29/22 - cisplatin/gemcitabine cycle 1 day 17 today. Overall feeling fine, some fatigue. Last week rash started in groin area, he used nystatin cream bid with improvement in the  groin area but noticed new rash on his low back all the way down his backside to posterior knees. He used hydrocortisone cream bid which was helpful. Appetite has been decreased but reports adequate PO intake, weight is up 14lbs since last week. Occasional tinnitus of left ear lasting <15. Ongoing productive cough for >2wks, slowly improving. Trace edema of bilateral ankles is stable since before start of chemo. Stable low back pain 2/2 parkinsons. Denies fever, chills, night sweats, hot flashes, headache, dizziness, balance issues, eye pain/problems, mucositis/odynophagia, chest pain, palpitations, SOB, nausea/vomiting, diarrhea/constipation, abdominal pain, dysuria, hematuria, incontinence, neuropathy, bleeding. \par \par 1/17/23 - cis/gem cycle 2 day 14 today. Overall feeling well, notes ongoing fatigue especially after treatment. Occasional transient tinnitus. Appetite waxes and wanes but reports adequate PO intake, reports adequate hydration. Urine flow is "ok", rare urgency, notes some dribbling at the end of the urine stream, nocturia 2-3x/night but sometimes more frequently. Rash on the backside has resolved, approx 2 days ago he developed a small red pruritic patch on the left forearm, he started using hydrocortisone cream today. Occasional lower back pain. Denies fever, chills, night sweats, hot flashes, headache, dizziness, balance issues, eye pain/problems, mucositis/odynophagia, dysgeusia, chest pain, palpitations, SOB, cough, nausea/vomiting, diarrhea/constipation, abdominal pain, dysuria, hematuria, incontinence, LE edema, neuropathy, bleeding, weakness.  [FreeTextEntry1] : 12/13/22....cis/gem started [de-identified] : 2/8/23 - cis/gem cycle 3 day 14 today.  Feels tired and lethargic this past week. Getting up every 2 hours to void and sometimes has difficulty getting back to sleep, wife disagrees. takes melatonin chewables at times, he is not sure it helps. 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  the fatigue is more this cycle. Appetite is variable. No N/V/D/C. On MiraLAX and senna. He had dysgeusia only on one day. has some intermittent tinnitus. No paresthesias of the feet. No fevers, no chills. No cough, no SALDANA. Urine flow is "good, not strong", RTS. NOcturia every 2 hours. No hesitancy, rare urgency, no incontinence. No blood, no dysuria. No edema. No chest pain/pressure/palpitations. Says that the chemotherapy experience is "better than he thought". Magnesium has been low now taking 4 tabs a day. He received IV mag last week.

## 2023-02-11 ENCOUNTER — APPOINTMENT (OUTPATIENT)
Dept: INFUSION THERAPY | Facility: HOSPITAL | Age: 68
End: 2023-02-11

## 2023-02-13 ENCOUNTER — RESULT REVIEW (OUTPATIENT)
Age: 68
End: 2023-02-13

## 2023-02-13 ENCOUNTER — APPOINTMENT (OUTPATIENT)
Dept: INFUSION THERAPY | Facility: HOSPITAL | Age: 68
End: 2023-02-13

## 2023-02-13 LAB
BASOPHILS # BLD AUTO: 0.05 K/UL — SIGNIFICANT CHANGE UP (ref 0–0.2)
BASOPHILS NFR BLD AUTO: 0.6 % — SIGNIFICANT CHANGE UP (ref 0–2)
EOSINOPHIL # BLD AUTO: 0.1 K/UL — SIGNIFICANT CHANGE UP (ref 0–0.5)
EOSINOPHIL NFR BLD AUTO: 1.1 % — SIGNIFICANT CHANGE UP (ref 0–6)
HCT VFR BLD CALC: 29.3 % — LOW (ref 39–50)
HGB BLD-MCNC: 9.7 G/DL — LOW (ref 13–17)
IMM GRANULOCYTES NFR BLD AUTO: 1.1 % — HIGH (ref 0–0.9)
LYMPHOCYTES # BLD AUTO: 1.38 K/UL — SIGNIFICANT CHANGE UP (ref 1–3.3)
LYMPHOCYTES # BLD AUTO: 15.4 % — SIGNIFICANT CHANGE UP (ref 13–44)
MAGNESIUM SERPL-MCNC: 1.3 MG/DL — LOW (ref 1.6–2.6)
MCHC RBC-ENTMCNC: 28.4 PG — SIGNIFICANT CHANGE UP (ref 27–34)
MCHC RBC-ENTMCNC: 33.1 G/DL — SIGNIFICANT CHANGE UP (ref 32–36)
MCV RBC AUTO: 85.9 FL — SIGNIFICANT CHANGE UP (ref 80–100)
MONOCYTES # BLD AUTO: 1.25 K/UL — HIGH (ref 0–0.9)
MONOCYTES NFR BLD AUTO: 13.9 % — SIGNIFICANT CHANGE UP (ref 2–14)
NEUTROPHILS # BLD AUTO: 6.11 K/UL — SIGNIFICANT CHANGE UP (ref 1.8–7.4)
NEUTROPHILS NFR BLD AUTO: 67.9 % — SIGNIFICANT CHANGE UP (ref 43–77)
NRBC # BLD: 0 /100 WBCS — SIGNIFICANT CHANGE UP (ref 0–0)
PLATELET # BLD AUTO: 414 K/UL — HIGH (ref 150–400)
RBC # BLD: 3.41 M/UL — LOW (ref 4.2–5.8)
RBC # FLD: 17.2 % — HIGH (ref 10.3–14.5)
WBC # BLD: 8.99 K/UL — SIGNIFICANT CHANGE UP (ref 3.8–10.5)
WBC # FLD AUTO: 8.99 K/UL — SIGNIFICANT CHANGE UP (ref 3.8–10.5)

## 2023-02-13 RX ORDER — POTASSIUM CHLORIDE 1500 MG/1
20 TABLET, EXTENDED RELEASE ORAL DAILY
Qty: 30 | Refills: 0 | Status: ACTIVE | COMMUNITY
Start: 2022-12-20 | End: 1900-01-01

## 2023-02-14 ENCOUNTER — NON-APPOINTMENT (OUTPATIENT)
Age: 68
End: 2023-02-14

## 2023-02-20 ENCOUNTER — RESULT REVIEW (OUTPATIENT)
Age: 68
End: 2023-02-20

## 2023-02-20 ENCOUNTER — APPOINTMENT (OUTPATIENT)
Dept: INFUSION THERAPY | Facility: HOSPITAL | Age: 68
End: 2023-02-20

## 2023-02-20 LAB
BASOPHILS # BLD AUTO: 0 K/UL — SIGNIFICANT CHANGE UP (ref 0–0.2)
BASOPHILS NFR BLD AUTO: 0 % — SIGNIFICANT CHANGE UP (ref 0–2)
EOSINOPHIL # BLD AUTO: 0.26 K/UL — SIGNIFICANT CHANGE UP (ref 0–0.5)
EOSINOPHIL NFR BLD AUTO: 3 % — SIGNIFICANT CHANGE UP (ref 0–6)
HCT VFR BLD CALC: 29.5 % — LOW (ref 39–50)
HGB BLD-MCNC: 9.9 G/DL — LOW (ref 13–17)
LYMPHOCYTES # BLD AUTO: 0.7 K/UL — LOW (ref 1–3.3)
LYMPHOCYTES # BLD AUTO: 8 % — LOW (ref 13–44)
MAGNESIUM SERPL-MCNC: 1.2 MG/DL — LOW (ref 1.6–2.6)
MCHC RBC-ENTMCNC: 28.4 PG — SIGNIFICANT CHANGE UP (ref 27–34)
MCHC RBC-ENTMCNC: 33.6 G/DL — SIGNIFICANT CHANGE UP (ref 32–36)
MCV RBC AUTO: 84.8 FL — SIGNIFICANT CHANGE UP (ref 80–100)
METAMYELOCYTES # FLD: 4 % — HIGH (ref 0–0)
MONOCYTES # BLD AUTO: 0.78 K/UL — SIGNIFICANT CHANGE UP (ref 0–0.9)
MONOCYTES NFR BLD AUTO: 9 % — SIGNIFICANT CHANGE UP (ref 2–14)
NEUTROPHILS # BLD AUTO: 6.63 K/UL — SIGNIFICANT CHANGE UP (ref 1.8–7.4)
NEUTROPHILS NFR BLD AUTO: 76 % — SIGNIFICANT CHANGE UP (ref 43–77)
NRBC # BLD: 0 /100 — SIGNIFICANT CHANGE UP (ref 0–0)
NRBC # BLD: SIGNIFICANT CHANGE UP /100 WBCS (ref 0–0)
PLAT MORPH BLD: NORMAL — SIGNIFICANT CHANGE UP
PLATELET # BLD AUTO: 213 K/UL — SIGNIFICANT CHANGE UP (ref 150–400)
RBC # BLD: 3.48 M/UL — LOW (ref 4.2–5.8)
RBC # FLD: 16.7 % — HIGH (ref 10.3–14.5)
RBC BLD AUTO: SIGNIFICANT CHANGE UP
WBC # BLD: 8.72 K/UL — SIGNIFICANT CHANGE UP (ref 3.8–10.5)
WBC # FLD AUTO: 8.72 K/UL — SIGNIFICANT CHANGE UP (ref 3.8–10.5)

## 2023-02-21 PROBLEM — N28.89 BILATERAL RENAL MASSES: Status: ACTIVE | Noted: 2022-11-23

## 2023-02-21 PROBLEM — Z79.899 ON ANTINEOPLASTIC CHEMOTHERAPY: Status: ACTIVE | Noted: 2022-12-27

## 2023-02-25 ENCOUNTER — APPOINTMENT (OUTPATIENT)
Dept: INFUSION THERAPY | Facility: HOSPITAL | Age: 68
End: 2023-02-25

## 2023-02-25 ENCOUNTER — NON-APPOINTMENT (OUTPATIENT)
Age: 68
End: 2023-02-25

## 2023-02-25 DIAGNOSIS — M54.50 LOW BACK PAIN, UNSPECIFIED: ICD-10-CM

## 2023-02-25 RX ORDER — CYCLOBENZAPRINE HYDROCHLORIDE 5 MG/1
5 TABLET, FILM COATED ORAL 3 TIMES DAILY
Qty: 9 | Refills: 0 | Status: ACTIVE | COMMUNITY
Start: 2023-02-25 | End: 1900-01-01

## 2023-02-27 ENCOUNTER — APPOINTMENT (OUTPATIENT)
Dept: HEMATOLOGY ONCOLOGY | Facility: CLINIC | Age: 68
End: 2023-02-27
Payer: MEDICARE

## 2023-02-27 ENCOUNTER — RESULT REVIEW (OUTPATIENT)
Age: 68
End: 2023-02-27

## 2023-02-27 ENCOUNTER — APPOINTMENT (OUTPATIENT)
Dept: HEMATOLOGY ONCOLOGY | Facility: CLINIC | Age: 68
End: 2023-02-27

## 2023-02-27 VITALS
SYSTOLIC BLOOD PRESSURE: 137 MMHG | OXYGEN SATURATION: 97 % | BODY MASS INDEX: 38.11 KG/M2 | HEART RATE: 85 BPM | RESPIRATION RATE: 16 BRPM | WEIGHT: 265 LBS | DIASTOLIC BLOOD PRESSURE: 80 MMHG | TEMPERATURE: 97 F

## 2023-02-27 DIAGNOSIS — N28.89 OTHER SPECIFIED DISORDERS OF KIDNEY AND URETER: ICD-10-CM

## 2023-02-27 DIAGNOSIS — Z79.899 OTHER LONG TERM (CURRENT) DRUG THERAPY: ICD-10-CM

## 2023-02-27 LAB
BASOPHILS # BLD AUTO: 0.01 K/UL — SIGNIFICANT CHANGE UP (ref 0–0.2)
BASOPHILS NFR BLD AUTO: 0.2 % — SIGNIFICANT CHANGE UP (ref 0–2)
EOSINOPHIL # BLD AUTO: 0.03 K/UL — SIGNIFICANT CHANGE UP (ref 0–0.5)
EOSINOPHIL NFR BLD AUTO: 0.5 % — SIGNIFICANT CHANGE UP (ref 0–6)
HCT VFR BLD CALC: 26 % — LOW (ref 39–50)
HGB BLD-MCNC: 8.5 G/DL — LOW (ref 13–17)
IMM GRANULOCYTES NFR BLD AUTO: 1.7 % — HIGH (ref 0–0.9)
LYMPHOCYTES # BLD AUTO: 1.28 K/UL — SIGNIFICANT CHANGE UP (ref 1–3.3)
LYMPHOCYTES # BLD AUTO: 21.7 % — SIGNIFICANT CHANGE UP (ref 13–44)
MCHC RBC-ENTMCNC: 28.7 PG — SIGNIFICANT CHANGE UP (ref 27–34)
MCHC RBC-ENTMCNC: 32.7 G/DL — SIGNIFICANT CHANGE UP (ref 32–36)
MCV RBC AUTO: 87.8 FL — SIGNIFICANT CHANGE UP (ref 80–100)
MONOCYTES # BLD AUTO: 0.84 K/UL — SIGNIFICANT CHANGE UP (ref 0–0.9)
MONOCYTES NFR BLD AUTO: 14.2 % — HIGH (ref 2–14)
NEUTROPHILS # BLD AUTO: 3.64 K/UL — SIGNIFICANT CHANGE UP (ref 1.8–7.4)
NEUTROPHILS NFR BLD AUTO: 61.7 % — SIGNIFICANT CHANGE UP (ref 43–77)
NRBC # BLD: 0 /100 WBCS — SIGNIFICANT CHANGE UP (ref 0–0)
PLATELET # BLD AUTO: 115 K/UL — LOW (ref 150–400)
RBC # BLD: 2.96 M/UL — LOW (ref 4.2–5.8)
RBC # FLD: 17.5 % — HIGH (ref 10.3–14.5)
WBC # BLD: 5.9 K/UL — SIGNIFICANT CHANGE UP (ref 3.8–10.5)
WBC # FLD AUTO: 5.9 K/UL — SIGNIFICANT CHANGE UP (ref 3.8–10.5)

## 2023-02-27 PROCEDURE — 99214 OFFICE O/P EST MOD 30 MIN: CPT

## 2023-02-27 RX ORDER — DEXAMETHASONE 4 MG/1
4 TABLET ORAL
Qty: 8 | Refills: 1 | Status: DISCONTINUED | COMMUNITY
Start: 2022-12-09 | End: 2023-02-27

## 2023-02-27 RX ORDER — CISPLATIN 1 MG/ML
100 INJECTION, SOLUTION INTRAVENOUS
Refills: 0 | Status: DISCONTINUED | COMMUNITY
Start: 2022-12-29 | End: 2023-02-27

## 2023-02-27 RX ORDER — MAGNESIUM OXIDE 241.3 MG/1000MG
400 TABLET ORAL
Qty: 90 | Refills: 1 | Status: DISCONTINUED | COMMUNITY
Start: 2022-12-20 | End: 2023-02-27

## 2023-02-27 RX ORDER — NYSTATIN 100000 [USP'U]/G
100000 CREAM TOPICAL TWICE DAILY
Qty: 1 | Refills: 1 | Status: DISCONTINUED | COMMUNITY
Start: 2022-12-23 | End: 2023-02-27

## 2023-02-27 RX ORDER — GEMCITABINE 100 MG/ML
1 INJECTION, SOLUTION INTRAVENOUS
Refills: 0 | Status: DISCONTINUED | COMMUNITY
Start: 2022-12-29 | End: 2023-02-27

## 2023-02-27 NOTE — ASSESSMENT
[Palliative Care Plan] : not applicable at this time [FreeTextEntry1] : Regis Juan completed 4 cycles of cisplatin and gemcitabine.  He was seen here on the weekend.  He had a sudden onset of pain in the back on Thursday evening towards the right side.  There was no trauma.  The pain is somewhat sharp.  It goes to the lower back.  It does not extend to the legs and there is no paresthesias.  He said it dissipated somewhat but increased with walking this morning.  He took 500 mg of Tylenol x2, which "took the edge off".  He says the pain is currently at 6 or 7.  It does not bother him while sitting.  It does not awaken him at night.  His worst pain score in the last 24 hours was 7.  The best was at 5.  There is no cough or shortness of breath.  There is no palpitations or chest pain.  He has his usual edema of the ankles which is no worse.  There are no headaches or dizziness.  He occasionally feels lightheaded.  He walks with a rollator for the past few months.  He has not fallen down.  There is no fevers or chills.  He says his appetite is variable.  There is no dysgeusia.  He does have some tinnitus.  His Parkinson symptoms are "good".  They are worse with anxiety.  He is independent in his activities of daily living, and he drives to work daily.  On physical examination, he appears well and in no acute distress.  His blood pressure is 137/80.  His weight is 120.2 kg, which is down 3.2 kg from February 8.  The lungs are clear and the heart examination is normal.  There is no palpable adenopathy present.  The abdominal examination is normal.  There is no spinal column or chest wall tenderness to palpation or percussion.  There is some discomfort to palpation in the right lower flank area which is not identifiable to any specific spot.  It is difficult to tell whether this is muscular or from the iliac crest.  There is edema of the extremities, which is brawny and unchanged from before.  It is slightly more on the left side than the right side.\par \par Laboratory results from today are pending.  The lab was busy and he was not able to go to the lab before his visit.\par \par He continues to have hypomagnesemia.  He continues on potassium supplements although his potassium is normal.  His magnesium level has been low for considerable time despite vigorous oral and IV supplementation.\par \par He has a CT scan scheduled for this Thursday, and we will have to see what impact the chemotherapy has had on his urothelial carcinoma.  He also has a renal cell carcinoma on the opposite side.  The goal here was to achieve some benefit in terms of urothelial carcinoma control. \par \par

## 2023-02-27 NOTE — PHYSICAL EXAM
[Restricted in physically strenuous activity but ambulatory and able to carry out work of a light or sedentary nature] : Status 1- Restricted in physically strenuous activity but ambulatory and able to carry out work of a light or sedentary nature, e.g., light house work, office work [Normal] : affect appropriate [de-identified] : brawny edema of ankles, left slightly more than left [de-identified] : no spinal column tenderness, some difficult to ascertain site of discomfort in right lower flank,

## 2023-02-27 NOTE — HISTORY OF PRESENT ILLNESS
[Disease: _____________________] : Disease: [unfilled] [T: ___] : T[unfilled] [N: ___] : N[unfilled] [de-identified] : Regis Mcintosh is seen in consultation on November 28, 2022.  He was referred for evaluation of bilateral renal masses.  2 weeks prior, he was experiencing gross hematuria which was intermittent.  There were no clots.  He had no flank pain.  He did notes some "tightness" on the left side, ? to Parkinson's?.  He has a history of stones in the past.  CT was done as an a outpatient at [facility].  A mass on the right kidney extends into the sinus near the division of the right renal artery.  The imaging was most compatible with renal cell carcinoma.  The left kidney revealed a hydronephrotic pelvis.  There was hyperdense material on the noncontrast CT scan, and it partially enhances with contrast.  There were some enlarged nodes on the left side.  A CT scan of the chest was negative.  He was seen by Dr. Hernandez on November 23, and he was concerned that the left likely represents urothelial carcinoma with the associated lymphadenopathy.  The plan is for ureteroscopy and biopsy with a JJ stent, scheduled for December 1, 2022 .  His creatinine was 0.9.  He is referred for consideration of the possibility of neoadjuvant chemotherapy.  I spoke with Dr. Hernandez after the patient was evaluated by him.  He feels that the right side kidney mass might be amenable to a partial nephrectomy.\par \par He was initially evaluated by Dr. Sean Jurado.  He had a PSA elevation in September 2020 at 4.76.  In 2018 his PSA was 1.9.  In November 2021, his PSA was 3.88.  In mid November he had an Enterococcus faecalis urinary tract infection and was treated.  A CT urogram was performed revealing bilateral renal masses concerning for malignant etiologies.  The right renal mass measured up to 7.1 cm and was concerning for renal cell carcinoma.  It was endophytic.  There were subtle features present raising concern for possible early vascular and urinary collecting system invasion.  The left renal pelvis mass measured 7.2 cm and had the radiologic features of urothelial carcinoma.  The mass was enhancing.  Retroperitoneal adenopathy was present.  The bladder was partly obscured due to right hip arthroplasty artifact.  No definite bladder lesion was identified.  There was a nonobstructing 0.5 cm right sided ureteral stone located distally.  The report says that there was stable lower chest findings including mildly enlarged lower mediastinal lymph node and a tiny lung base nodule.  These findings were stable from a CT scan 2 years ago.\par \par There was new para-aortic lymphadenopathy compared to the prior scan from 2 years ago.  There is a left para-aortic lymph node at the level of the renal vasculature measuring 2.1 x 1.5 cm.  An anterior periaortic lymph node at the level of the LEONEL was 2.1 x 1.5 cm.\par Cystoscopy was performed on November 21 by Dr. Jurado.  Urethra was normal.  The prostatic urethra demonstrated obstruction due to lateral lobe enlargement of the prostate.  The bladder was normal.  Both ureteral orifices revealed clear E flux of urine.  No tumors were noted within the bladder.\par \par Overall, he feels "fine". He has minor aches and pains, present for a long time. He has some difficulty walking, can't do long distance, standing in one spot is an issue, diagnosed with the Parkinson's in 2010. Had DBS placed 3 years ago. He says that helped his symptoms a lot. NO headaches, no dizziness, some balance issues. No falls except once this summer, while trying to get out of the pool. Appetite has been normal, no weight loss. Occ swelling of the legs, no h/o DVT. No chest pain/pressure/palpitations. No cough, no SALDANA. Denies fatigue. Works daily, goes to PT once weekly, and home PT once a week in addition. Rides stationary bike a few times a week. No prior h/o hematuria. \par \par 12/19/22...started cis/gem on 12/13/22, day 7, cycle 1. Feels "good". He had no fatigue until 2 days ago, started 12/17, remains fatigued. Less active. Brief naps, not sleeping well at night. This has been the case since the ureteral stent. Flow is good, daytime frequency, nocturia x 3-4. No urgency. No incontinence. Occ blood, no clots. Some cough no SALDANA. NO fevers, no chills. No N/V/D, some constipation, on senna, Colace and MiraLAX. No headaches, no dizziness. Balance issues due to Parkinson's, uses rollator outside, but not in the house. No recent falls. No edema. No chest pain/pressure/palpitations. URI, with cough, using Robitussin\par \par 12/29/22 - cisplatin/gemcitabine cycle 1 day 17 today. Overall feeling fine, some fatigue. Last week rash started in groin area, he used nystatin cream bid with improvement in the  groin area but noticed new rash on his low back all the way down his backside to posterior knees. He used hydrocortisone cream bid which was helpful. Appetite has been decreased but reports adequate PO intake, weight is up 14lbs since last week. Occasional tinnitus of left ear lasting <15. Ongoing productive cough for >2wks, slowly improving. Trace edema of bilateral ankles is stable since before start of chemo. Stable low back pain 2/2 parkinsons. Denies fever, chills, night sweats, hot flashes, headache, dizziness, balance issues, eye pain/problems, mucositis/odynophagia, chest pain, palpitations, SOB, nausea/vomiting, diarrhea/constipation, abdominal pain, dysuria, hematuria, incontinence, neuropathy, bleeding. \par \par 1/17/23 - cis/gem cycle 2 day 14 today. Overall feeling well, notes ongoing fatigue especially after treatment. Occasional transient tinnitus. Appetite waxes and wanes but reports adequate PO intake, reports adequate hydration. Urine flow is "ok", rare urgency, notes some dribbling at the end of the urine stream, nocturia 2-3x/night but sometimes more frequently. Rash on the backside has resolved, approx 2 days ago he developed a small red pruritic patch on the left forearm, he started using hydrocortisone cream today. Occasional lower back pain. Denies fever, chills, night sweats, hot flashes, headache, dizziness, balance issues, eye pain/problems, mucositis/odynophagia, dysgeusia, chest pain, palpitations, SOB, cough, nausea/vomiting, diarrhea/constipation, abdominal pain, dysuria, hematuria, incontinence, LE edema, neuropathy, bleeding, weakness. \par \par 2/8/23 - cis/gem cycle 3 day 14 today.  Feels tired and lethargic this past week. Getting up every 2 hours to void and sometimes has difficulty getting back to sleep, wife disagrees. takes melatonin chewables at times, he is not sure it helps. 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  the fatigue is more this cycle. Appetite is variable. No N/V/D/C. On MiraLAX and senna. He had dysgeusia only on one day. has some intermittent tinnitus. No paresthesias of the feet. No fevers, no chills. No cough, no SALDANA. Urine flow is "good, not strong", RTS. NOcturia every 2 hours. No hesitancy, rare urgency, no incontinence. No blood, no dysuria. No edema. No chest pain/pressure/palpitations. Says that the chemotherapy experience is "better than he thought". Magnesium has been low now taking 4 tabs a day. He received IV mag last week.  [FreeTextEntry1] : 12/13/22....cis/gem started [de-identified] : 2/27/23 - cis/gem cycle 4 day 15 today.  Had back pain, onset Thursday evening, sudden onset, without any trauma. Sharp pain, more on the right side, goes to lower back, not the leg, no paresthesias. It dissipated somewhat, but increased with walking this AM, Took Tylenol 500 x 2, which "took the edge off". Pain currently at 6-7, does not bother him while sitting. Does not awaken him. Worst pain score in last 24 hours at 7, best at 5. NO cough, no SALDANA. NO palpitations, no chest pains. Usual edema of the ankles, no worse. No headaches, no dizziness, occ lightheaded. Walks with a  rollator for the last few months. No falls. No fevers, no chills. Appetite is variable, no dysgeusia. Has some tinnitus. Parkinson's symptoms are "good", worse with anxiety.  Independent in [employer], drives to work daily

## 2023-02-27 NOTE — REVIEW OF SYSTEMS
[Fatigue] : fatigue [Recent Change In Weight] : ~T recent weight change [Anxiety] : anxiety [Muscle Weakness] : muscle weakness [Negative] : Gastrointestinal [Fever] : no fever [Chills] : no chills [Chest Pain] : no chest pain [Palpitations] : no palpitations [Lower Ext Edema] : no lower extremity edema [Wheezing] : no wheezing [Cough] : no cough [SOB on Exertion] : no shortness of breath during exertion [Dysuria] : no dysuria [Incontinence] : no incontinence [Joint Pain] : no joint pain [Joint Stiffness] : no joint stiffness [Muscle Pain] : no muscle pain [Skin Rash] : no skin rash [Dizziness] : no dizziness [Depression] : no depression [Easy Bleeding] : no tendency for easy bleeding [Easy Bruising] : no tendency for easy bruising [FreeTextEntry4] : tinnitus [FreeTextEntry8] : nocturia x 2-3 [de-identified] : No HA, no paresthesias

## 2023-02-28 LAB
ALBUMIN SERPL ELPH-MCNC: 4 G/DL
ALP BLD-CCNC: 74 U/L
ALT SERPL-CCNC: 9 U/L
ANION GAP SERPL CALC-SCNC: 12 MMOL/L
AST SERPL-CCNC: 12 U/L
BILIRUB SERPL-MCNC: 0.2 MG/DL
BUN SERPL-MCNC: 19 MG/DL
CALCIUM SERPL-MCNC: 9.3 MG/DL
CHLORIDE SERPL-SCNC: 104 MMOL/L
CO2 SERPL-SCNC: 25 MMOL/L
CREAT SERPL-MCNC: 1.02 MG/DL
EGFR: 81 ML/MIN/1.73M2
GLUCOSE SERPL-MCNC: 100 MG/DL
MAGNESIUM SERPL-MCNC: 1.3 MG/DL
POTASSIUM SERPL-SCNC: 3.8 MMOL/L
PROT SERPL-MCNC: 6.2 G/DL
SODIUM SERPL-SCNC: 141 MMOL/L

## 2023-03-01 ENCOUNTER — APPOINTMENT (OUTPATIENT)
Dept: INFUSION THERAPY | Facility: HOSPITAL | Age: 68
End: 2023-03-01

## 2023-03-02 ENCOUNTER — OUTPATIENT (OUTPATIENT)
Dept: OUTPATIENT SERVICES | Facility: HOSPITAL | Age: 68
LOS: 1 days | End: 2023-03-02
Payer: MEDICARE

## 2023-03-02 ENCOUNTER — APPOINTMENT (OUTPATIENT)
Dept: CT IMAGING | Facility: CLINIC | Age: 68
End: 2023-03-02
Payer: MEDICARE

## 2023-03-02 DIAGNOSIS — Z90.49 ACQUIRED ABSENCE OF OTHER SPECIFIED PARTS OF DIGESTIVE TRACT: Chronic | ICD-10-CM

## 2023-03-02 DIAGNOSIS — Z96.649 PRESENCE OF UNSPECIFIED ARTIFICIAL HIP JOINT: Chronic | ICD-10-CM

## 2023-03-02 DIAGNOSIS — C64.9 MALIGNANT NEOPLASM OF UNSPECIFIED KIDNEY, EXCEPT RENAL PELVIS: ICD-10-CM

## 2023-03-02 DIAGNOSIS — Z98.890 OTHER SPECIFIED POSTPROCEDURAL STATES: Chronic | ICD-10-CM

## 2023-03-02 PROCEDURE — 71260 CT THORAX DX C+: CPT | Mod: 26,MH

## 2023-03-02 PROCEDURE — 82565 ASSAY OF CREATININE: CPT

## 2023-03-02 PROCEDURE — 74178 CT ABD&PLV WO CNTR FLWD CNTR: CPT | Mod: 26,MH

## 2023-03-02 PROCEDURE — 71260 CT THORAX DX C+: CPT

## 2023-03-02 PROCEDURE — 74178 CT ABD&PLV WO CNTR FLWD CNTR: CPT

## 2023-03-10 ENCOUNTER — APPOINTMENT (OUTPATIENT)
Dept: INFUSION THERAPY | Facility: HOSPITAL | Age: 68
End: 2023-03-10

## 2023-03-10 LAB
ALBUMIN SERPL ELPH-MCNC: 4 G/DL
ALP BLD-CCNC: 82 U/L
ALT SERPL-CCNC: 8 U/L
ANION GAP SERPL CALC-SCNC: 17 MMOL/L
AST SERPL-CCNC: 14 U/L
BILIRUB SERPL-MCNC: 0.3 MG/DL
BUN SERPL-MCNC: 18 MG/DL
CALCIUM SERPL-MCNC: 9.4 MG/DL
CHLORIDE SERPL-SCNC: 104 MMOL/L
CO2 SERPL-SCNC: 20 MMOL/L
CREAT SERPL-MCNC: 0.91 MG/DL
EGFR: 93 ML/MIN/1.73M2
GLUCOSE SERPL-MCNC: 111 MG/DL
MAGNESIUM SERPL-MCNC: 1.3 MG/DL
POTASSIUM SERPL-SCNC: 4 MMOL/L
PROT SERPL-MCNC: 7 G/DL
SODIUM SERPL-SCNC: 141 MMOL/L

## 2023-03-13 ENCOUNTER — APPOINTMENT (OUTPATIENT)
Dept: INFUSION THERAPY | Facility: HOSPITAL | Age: 68
End: 2023-03-13

## 2023-03-13 ENCOUNTER — RESULT REVIEW (OUTPATIENT)
Age: 68
End: 2023-03-13

## 2023-03-13 LAB
HCT VFR BLD CALC: 31.5 % — LOW (ref 39–50)
HGB BLD-MCNC: 10.1 G/DL — LOW (ref 13–17)
MCHC RBC-ENTMCNC: 29 PG — SIGNIFICANT CHANGE UP (ref 27–34)
MCHC RBC-ENTMCNC: 32.1 G/DL — SIGNIFICANT CHANGE UP (ref 32–36)
MCV RBC AUTO: 90.5 FL — SIGNIFICANT CHANGE UP (ref 80–100)
PLATELET # BLD AUTO: 312 K/UL — SIGNIFICANT CHANGE UP (ref 150–400)
RBC # BLD: 3.48 M/UL — LOW (ref 4.2–5.8)
RBC # FLD: 17.2 % — HIGH (ref 10.3–14.5)
WBC # BLD: 8.02 K/UL — SIGNIFICANT CHANGE UP (ref 3.8–10.5)
WBC # FLD AUTO: 8.02 K/UL — SIGNIFICANT CHANGE UP (ref 3.8–10.5)

## 2023-03-15 ENCOUNTER — OUTPATIENT (OUTPATIENT)
Dept: OUTPATIENT SERVICES | Facility: HOSPITAL | Age: 68
LOS: 1 days | End: 2023-03-15
Payer: MEDICARE

## 2023-03-15 ENCOUNTER — APPOINTMENT (OUTPATIENT)
Dept: NUCLEAR MEDICINE | Facility: CLINIC | Age: 68
End: 2023-03-15
Payer: MEDICARE

## 2023-03-15 DIAGNOSIS — Z90.49 ACQUIRED ABSENCE OF OTHER SPECIFIED PARTS OF DIGESTIVE TRACT: Chronic | ICD-10-CM

## 2023-03-15 DIAGNOSIS — C64.9 MALIGNANT NEOPLASM OF UNSPECIFIED KIDNEY, EXCEPT RENAL PELVIS: ICD-10-CM

## 2023-03-15 PROCEDURE — 78815 PET IMAGE W/CT SKULL-THIGH: CPT

## 2023-03-15 PROCEDURE — 78815 PET IMAGE W/CT SKULL-THIGH: CPT | Mod: 26,PS,MH

## 2023-03-15 PROCEDURE — A9552: CPT

## 2023-03-20 ENCOUNTER — RESULT REVIEW (OUTPATIENT)
Age: 68
End: 2023-03-20

## 2023-03-20 ENCOUNTER — APPOINTMENT (OUTPATIENT)
Dept: INFUSION THERAPY | Facility: HOSPITAL | Age: 68
End: 2023-03-20

## 2023-03-20 DIAGNOSIS — E83.42 HYPOMAGNESEMIA: ICD-10-CM

## 2023-03-20 LAB
APTT BLD: 32.9 SEC — SIGNIFICANT CHANGE UP (ref 27.5–35.5)
INR BLD: 1.12 RATIO — SIGNIFICANT CHANGE UP (ref 0.88–1.16)
MAGNESIUM SERPL-MCNC: 1.5 MG/DL — LOW (ref 1.6–2.6)
PROTHROM AB SERPL-ACNC: 13.1 SEC — SIGNIFICANT CHANGE UP (ref 10.5–13.4)

## 2023-03-21 ENCOUNTER — APPOINTMENT (OUTPATIENT)
Dept: ULTRASOUND IMAGING | Facility: IMAGING CENTER | Age: 68
End: 2023-03-21
Payer: MEDICARE

## 2023-03-21 ENCOUNTER — RESULT REVIEW (OUTPATIENT)
Age: 68
End: 2023-03-21

## 2023-03-21 ENCOUNTER — OUTPATIENT (OUTPATIENT)
Dept: OUTPATIENT SERVICES | Facility: HOSPITAL | Age: 68
LOS: 1 days | End: 2023-03-21
Payer: MEDICARE

## 2023-03-21 DIAGNOSIS — Z98.890 OTHER SPECIFIED POSTPROCEDURAL STATES: Chronic | ICD-10-CM

## 2023-03-21 DIAGNOSIS — C64.9 MALIGNANT NEOPLASM OF UNSPECIFIED KIDNEY, EXCEPT RENAL PELVIS: ICD-10-CM

## 2023-03-21 PROCEDURE — 76942 ECHO GUIDE FOR BIOPSY: CPT | Mod: 26

## 2023-03-21 PROCEDURE — 50200 RENAL BIOPSY PERQ: CPT | Mod: RT

## 2023-03-21 PROCEDURE — 76942 ECHO GUIDE FOR BIOPSY: CPT

## 2023-03-21 PROCEDURE — 88305 TISSUE EXAM BY PATHOLOGIST: CPT

## 2023-03-21 PROCEDURE — 88173 CYTOPATH EVAL FNA REPORT: CPT

## 2023-03-21 PROCEDURE — 88173 CYTOPATH EVAL FNA REPORT: CPT | Mod: 26

## 2023-03-21 PROCEDURE — 50200 RENAL BIOPSY PERQ: CPT

## 2023-03-21 PROCEDURE — 88305 TISSUE EXAM BY PATHOLOGIST: CPT | Mod: 26

## 2023-03-21 PROCEDURE — 88172 CYTP DX EVAL FNA 1ST EA SITE: CPT

## 2023-03-24 PROBLEM — D64.81 ANEMIA DUE TO ANTINEOPLASTIC CHEMOTHERAPY: Status: ACTIVE | Noted: 2023-01-17

## 2023-03-24 PROBLEM — C64.9 UROTHELIAL CARCINOMA OF KIDNEY: Status: ACTIVE | Noted: 2022-12-09

## 2023-03-26 ENCOUNTER — OUTPATIENT (OUTPATIENT)
Dept: OUTPATIENT SERVICES | Facility: HOSPITAL | Age: 68
LOS: 1 days | Discharge: HOME | End: 2023-03-26

## 2023-03-26 DIAGNOSIS — Z90.49 ACQUIRED ABSENCE OF OTHER SPECIFIED PARTS OF DIGESTIVE TRACT: Chronic | ICD-10-CM

## 2023-03-26 DIAGNOSIS — Z96.649 PRESENCE OF UNSPECIFIED ARTIFICIAL HIP JOINT: Chronic | ICD-10-CM

## 2023-03-26 DIAGNOSIS — Z98.890 OTHER SPECIFIED POSTPROCEDURAL STATES: Chronic | ICD-10-CM

## 2023-03-26 DIAGNOSIS — N28.89 OTHER SPECIFIED DISORDERS OF KIDNEY AND URETER: ICD-10-CM

## 2023-03-28 LAB — NON-GYNECOLOGICAL CYTOLOGY STUDY: SIGNIFICANT CHANGE UP

## 2023-03-30 ENCOUNTER — APPOINTMENT (OUTPATIENT)
Dept: HEMATOLOGY ONCOLOGY | Facility: CLINIC | Age: 68
End: 2023-03-30

## 2023-03-30 DIAGNOSIS — C64.9 MALIGNANT NEOPLASM OF UNSPECIFIED KIDNEY, EXCEPT RENAL PELVIS: ICD-10-CM

## 2023-03-30 DIAGNOSIS — D64.81 ANEMIA DUE TO ANTINEOPLASTIC CHEMOTHERAPY: ICD-10-CM

## 2023-03-30 DIAGNOSIS — T45.1X5A ANEMIA DUE TO ANTINEOPLASTIC CHEMOTHERAPY: ICD-10-CM

## 2023-04-19 ENCOUNTER — APPOINTMENT (OUTPATIENT)
Dept: ORTHOPEDIC SURGERY | Facility: CLINIC | Age: 68
End: 2023-04-19
Payer: MEDICARE

## 2023-04-19 DIAGNOSIS — G20.A1 PARKINSON'S DISEASE WITHOUT DYSKINESIA, WITHOUT MENTION OF FLUCTUATIONS: ICD-10-CM

## 2023-04-19 DIAGNOSIS — M12.812 OTHER SPECIFIC ARTHROPATHIES, NOT ELSEWHERE CLASSIFIED, LEFT SHOULDER: ICD-10-CM

## 2023-04-19 DIAGNOSIS — C80.1 MALIGNANT (PRIMARY) NEOPLASM, UNSPECIFIED: ICD-10-CM

## 2023-04-19 DIAGNOSIS — Z00.00 ENCOUNTER FOR GENERAL ADULT MEDICAL EXAMINATION W/OUT ABNORMAL FINDINGS: ICD-10-CM

## 2023-04-19 DIAGNOSIS — M12.811 OTHER SPECIFIC ARTHROPATHIES, NOT ELSEWHERE CLASSIFIED, RIGHT SHOULDER: ICD-10-CM

## 2023-04-19 DIAGNOSIS — F02.80 PARKINSON'S DISEASE WITHOUT DYSKINESIA, WITHOUT MENTION OF FLUCTUATIONS: ICD-10-CM

## 2023-04-19 PROCEDURE — 73010 X-RAY EXAM OF SHOULDER BLADE: CPT | Mod: 50

## 2023-04-19 PROCEDURE — 20611 DRAIN/INJ JOINT/BURSA W/US: CPT | Mod: 50

## 2023-04-19 PROCEDURE — 99203 OFFICE O/P NEW LOW 30 MIN: CPT | Mod: 25

## 2023-04-19 PROCEDURE — 73030 X-RAY EXAM OF SHOULDER: CPT | Mod: 50

## 2023-04-19 NOTE — DISCUSSION/SUMMARY
[de-identified] : 68m with b/l shoulder rotator cuff arthropathy. Pain complaints are R>L\par 1) csi right shoulder today - tolerated well\par 2) cryotherapy, rest and activity modification\par 3) hep \par 4) will consider csi in left shoulder at f/up\par \par Entered by Kami Marrero acting as scribe.\par Dr. Boles-\par The documentation recorded by the scribe accurately reflects the service I personally performed and the decisions made by me. \par \par

## 2023-04-19 NOTE — PHYSICAL EXAM
[Bilateral] : shoulder bilaterally [NL (0-180)] : full passive forward flexion 0-180 degrees [] : no atrophy [de-identified] : False [TWNoteComboBox7] : active forward flexion 90 degrees

## 2023-04-19 NOTE — IMAGING
[Bilateral] : shoulder bilaterally [There are no fractures, subluxations or dislocations. No significant abnormalities are seen] : There are no fractures, subluxations or dislocations. No significant abnormalities are seen [Glenohumeral arthritis] : Glenohumeral arthritis [Cephalic migration of humeral head] : Cephalic migration of humeral head

## 2023-04-19 NOTE — PROCEDURE
[FreeTextEntry3] : Large joint injection was performed of the right shoulder. An injection of Lidocaine 3cc of 1% , Ropivacaine 4cc of 0.5% , Triamcinolone (Kenalog) 2cc of 40 mg  was used. \par Patient was advised to call if redness, pain or fever occur and apply ice for 15 minutes out of every hour for the next 12-24 hours as tolerated. \par \par Patient has tried OTC's including aspirin, Ibuprofen, Aleve, etc or prescription NSAIDS, and/or exercises at home and/or physical therapy without satisfactory response, patient had decreased mobility in the joint and the risks benefits, and alternatives have been discussed, and verbal consent was obtained. \par The site was prepped with alcohol, betadine and ethyl chloride sprayed topically\par \par The risks, benefits and contents of the injection have been discussed.  Risks include but are not limited to allergic reaction, flare reaction, permanent white skin discoloration at the injection site and infection.  The patient understands the risks and agrees to having the injection.  All questions have been answered.\par \par Ultrasound guidance was indicated for this patient due to prior failure or difficult injection. All ultrasound images have been permanently captured and stored accordingly in our picture archiving and communication system.\par

## 2023-04-19 NOTE — HISTORY OF PRESENT ILLNESS
[de-identified] : 04/19/2023 : MAR WILSON is a 68 year old RHD male presenting today for right shoulder pain. Patient developed right shoulder pain in 2018 and had CSI, pain resided. Shoulder replacement was suggested to him in the past, but deferred due to age and Parkinson's.  Pain started getting progressively worse since 3/15/23 with no JESÚS. Worse with shoulder movements. Better with rest. No recent Prior treatment. PMH: diagnosed with parkinson's disease in 2012, \par

## 2023-06-30 ENCOUNTER — APPOINTMENT (OUTPATIENT)
Dept: UROLOGY | Facility: CLINIC | Age: 68
End: 2023-06-30
Payer: MEDICARE

## 2023-06-30 VITALS — SYSTOLIC BLOOD PRESSURE: 138 MMHG | HEART RATE: 74 BPM | RESPIRATION RATE: 16 BRPM | DIASTOLIC BLOOD PRESSURE: 78 MMHG

## 2023-06-30 PROCEDURE — 99214 OFFICE O/P EST MOD 30 MIN: CPT

## 2023-07-02 LAB — BACTERIA UR CULT: NORMAL

## 2023-07-20 ENCOUNTER — OUTPATIENT (OUTPATIENT)
Dept: OUTPATIENT SERVICES | Facility: HOSPITAL | Age: 68
LOS: 1 days | End: 2023-07-20

## 2023-07-20 VITALS
DIASTOLIC BLOOD PRESSURE: 72 MMHG | RESPIRATION RATE: 16 BRPM | HEIGHT: 68 IN | OXYGEN SATURATION: 97 % | HEART RATE: 80 BPM | TEMPERATURE: 98 F | SYSTOLIC BLOOD PRESSURE: 138 MMHG | WEIGHT: 257.94 LBS

## 2023-07-20 DIAGNOSIS — G20 PARKINSON'S DISEASE: ICD-10-CM

## 2023-07-20 DIAGNOSIS — N20.1 CALCULUS OF URETER: ICD-10-CM

## 2023-07-20 DIAGNOSIS — Z90.49 ACQUIRED ABSENCE OF OTHER SPECIFIED PARTS OF DIGESTIVE TRACT: Chronic | ICD-10-CM

## 2023-07-20 DIAGNOSIS — E83.42 HYPOMAGNESEMIA: ICD-10-CM

## 2023-07-20 DIAGNOSIS — Z96.89 PRESENCE OF OTHER SPECIFIED FUNCTIONAL IMPLANTS: ICD-10-CM

## 2023-07-20 DIAGNOSIS — Z98.890 OTHER SPECIFIED POSTPROCEDURAL STATES: Chronic | ICD-10-CM

## 2023-07-20 DIAGNOSIS — I10 ESSENTIAL (PRIMARY) HYPERTENSION: ICD-10-CM

## 2023-07-20 DIAGNOSIS — Z96.649 PRESENCE OF UNSPECIFIED ARTIFICIAL HIP JOINT: Chronic | ICD-10-CM

## 2023-07-20 DIAGNOSIS — Z96.89 PRESENCE OF OTHER SPECIFIED FUNCTIONAL IMPLANTS: Chronic | ICD-10-CM

## 2023-07-20 DIAGNOSIS — Z91.89 OTHER SPECIFIED PERSONAL RISK FACTORS, NOT ELSEWHERE CLASSIFIED: ICD-10-CM

## 2023-07-20 LAB
BLD GP AB SCN SERPL QL: NEGATIVE — SIGNIFICANT CHANGE UP
RH IG SCN BLD-IMP: POSITIVE — SIGNIFICANT CHANGE UP

## 2023-07-20 RX ORDER — METOPROLOL TARTRATE 50 MG
1 TABLET ORAL
Qty: 0 | Refills: 0 | DISCHARGE

## 2023-07-20 RX ORDER — FUROSEMIDE 40 MG
1 TABLET ORAL
Qty: 0 | Refills: 0 | DISCHARGE

## 2023-07-20 RX ORDER — ASPIRIN/CALCIUM CARB/MAGNESIUM 324 MG
81 TABLET ORAL
Qty: 0 | Refills: 0 | DISCHARGE

## 2023-07-20 RX ORDER — ASCORBIC ACID 60 MG
1 TABLET,CHEWABLE ORAL
Qty: 0 | Refills: 0 | DISCHARGE

## 2023-07-20 RX ORDER — CHOLECALCIFEROL (VITAMIN D3) 125 MCG
1 CAPSULE ORAL
Qty: 0 | Refills: 0 | DISCHARGE

## 2023-07-20 RX ORDER — LEVOTHYROXINE SODIUM 125 MCG
1 TABLET ORAL
Qty: 0 | Refills: 0 | DISCHARGE

## 2023-07-20 RX ORDER — CARBIDOPA AND LEVODOPA 25; 100 MG/1; MG/1
0 TABLET ORAL
Qty: 0 | Refills: 0 | DISCHARGE

## 2023-07-20 RX ORDER — SODIUM CHLORIDE 9 MG/ML
1000 INJECTION, SOLUTION INTRAVENOUS
Refills: 0 | Status: DISCONTINUED | OUTPATIENT
Start: 2023-07-31 | End: 2023-08-14

## 2023-07-20 RX ORDER — ATORVASTATIN CALCIUM 80 MG/1
5 TABLET, FILM COATED ORAL
Qty: 0 | Refills: 0 | DISCHARGE

## 2023-07-20 NOTE — H&P PST ADULT - NS MD HP INPLANTS MED DEV
Deep Brain Stimulator in place/Artificial joint/Brain/Spinal implant/Urethral stent/Vascular access device Deep Brain Stimulator, left CW Mediport in place/Artificial joint/Brain/Spinal implant/Urethral stent/Vascular access device

## 2023-07-20 NOTE — H&P PST ADULT - PROBLEM SELECTOR PLAN 4
Patient instructed to take agnesium with a sip of water on the morning of procedure. Patient instructed to take magnesium with a sip of water on the morning of procedure.

## 2023-07-20 NOTE — H&P PST ADULT - CARDIOVASCULAR
details… normal/regular rate and rhythm/S1 S2 present/no gallops/no rub/murmur normal/regular rate and rhythm/S1 S2 present

## 2023-07-20 NOTE — H&P PST ADULT - HISTORY OF PRESENT ILLNESS
This is a 67 y/o male PMH hypertension, non-cancerous rectal mass, S/P resection of rectum and ileostomy, with reversal, Parkinson's disease, S/P DBS 4 years ago, possible BALJINDER based on Stop bang score of 6, renal calculi with spontaneous passage, began having hematuria which he thought was due to renal calculus and was found to have bilateral kidney masses, left side is consistent with urothelial carcinoma and right side is consistent with renal cell carcinoma. patient underwent immunotherapy in [facility], now presents  today for right flexible ureteroscopy, laser lithotripsy, JJ stent placement,      This is a 69 y/o male PMH hypertension, non-cancerous rectal mass, S/P resection of rectum and ileostomy, with reversal, Parkinson's disease, S/P DBS 5 years ago, possible BALJINDER based on Stop bang score of 6. Diagnosed with bilateral kidney masses, left side is consistent with urothelial carcinoma and right side is consistent with renal cell carcinoma. Patient underwent immunotherapy in [facility], Patient recently went to [facility] ER with severe back pain found to have right ureter stone, s/p stent placement, as per wife stone is unable to pass thru the stent due to it's size, and patient is scheduled for lithotomy      as per Booking sheet patient is scheduled for  right flexible ureteroscopy, laser lithotripsy, JJ stent placement,  procedure to be confirmed with Dr Hernandez surgical coordinator

## 2023-07-20 NOTE — H&P PST ADULT - MUSCULOSKELETAL
negative normal/ROM intact/normal gait/strength 5/5 bilateral upper extremities/strength 5/5 bilateral lower extremities bilateral shoulder/decreased ROM/decreased ROM due to pain/decreased strength/abnormal gait details…

## 2023-07-20 NOTE — H&P PST ADULT - GASTROINTESTINAL
details… normal/soft/nontender/nondistended/normal active bowel sounds soft/nontender/normal active bowel sounds

## 2023-07-20 NOTE — H&P PST ADULT - OTHER CARE PROVIDERS
Dr. Still (cardiologist)  Dr. Cameron (onc)  Dr. Still (cardiologist)  [phone] Dr. Cameron (onc) [facility] [phone]

## 2023-07-20 NOTE — H&P PST ADULT - NSICDXPASTSURGICALHX_GEN_ALL_CORE_FT
PAST SURGICAL HISTORY:  H/O ileostomy reversed 12/2014    H/O laminectomy lumbar x2    H/O resection of rectum large mass, was non-cancerous    History of hip replacement right    History of reversal of ileostomy     S/P deep brain stimulator placement     S/P hernia repair incisional

## 2023-07-20 NOTE — H&P PST ADULT - RESPIRATORY
normal/clear to auscultation bilaterally/no wheezes/no rales/no rhonchi normal/clear to auscultation bilaterally

## 2023-07-20 NOTE — H&P PST ADULT - PROBLEM SELECTOR PLAN 1
Patient tentatively scheduled for    Pre-op instructions provided. Pt given verbal and written instructions with teach back on chlorhexidine shampoo and pepcid. Pt verbalized understanding with return demonstration. Patient tentatively scheduled for as per Booking sheet patient is scheduled for  right flexible ureteroscopy, laser lithotripsy, JJ stent placement,  procedure to be confirmed with Dr Hernandez surgical coordinator     Pre-op instructions provided. Pt given verbal and written instructions with teach back on chlorhexidine shampoo . Pt verbalized understanding with return demonstration.

## 2023-07-20 NOTE — H&P PST ADULT - NEUROLOGICAL COMMENTS
patient patient recently diagnosed bell's palsy completed steroid course , as per patient his symptoms has resolved

## 2023-07-20 NOTE — H&P PST ADULT - PROBLEM SELECTOR PLAN 3
Patient instructed to take amlodipine, lsartan with a sip of water on the morning of procedure.    Hold aspirin 5 days prior to procedure Patient instructed to take amlodipine, losartan and metoprolol with a sip of water on the morning of procedure.    Hold aspirin 5 days prior to procedure    copy CBC, CMP in the chart    requesting medical eval due hx chemotherapy and activity intolerance Patient instructed to take amlodipine, losartan and metoprolol with a sip of water on the morning of procedure.    Hold aspirin 5 days prior to procedure    copy CBC, CMP in the chart    ordered urine culture and T & S    requesting medical eval due hx chemotherapy and activity intolerance

## 2023-07-20 NOTE — H&P PST ADULT - NSICDXPASTMEDICALHX_GEN_ALL_CORE_FT
PAST MEDICAL HISTORY:  Anxiety     Edema leg     Hypertension     Hypothyroid     BALJINDER (obstructive sleep apnea) based on stop bang score of 6    Osteoarthritis     Parkinson disease     Rectal mass     Renal cell carcinoma     Urothelial cancer      PAST MEDICAL HISTORY:  Anxiety     Bell's palsy     Edema leg     Hypertension     Hypomagnesemia     Hypothyroid     BALJINDER (obstructive sleep apnea) based on stop bang score of 6    Osteoarthritis     Parkinson disease     Rectal mass     Renal cell carcinoma     Urothelial cancer

## 2023-07-20 NOTE — H&P PST ADULT - PROBLEM SELECTOR PLAN 2
Patient instructed to take  carbidopa with a sip of water on the morning of procedure. Patient instructed to take  carbidopa with a sip of water on the morning of procedure.    requesting last neuro note

## 2023-07-20 NOTE — H&P PST ADULT - FUNCTIONAL STATUS
unable to asses METs due to activity intolerance due to parkinson's, requesting medical evaluation./less than 4 METS unable to asses METs due to activity intolerance due to parkinson's,/less than 4 METS

## 2023-07-24 ENCOUNTER — NON-APPOINTMENT (OUTPATIENT)
Age: 68
End: 2023-07-24

## 2023-07-24 DIAGNOSIS — N39.0 URINARY TRACT INFECTION, SITE NOT SPECIFIED: ICD-10-CM

## 2023-07-24 DIAGNOSIS — A49.9 URINARY TRACT INFECTION, SITE NOT SPECIFIED: ICD-10-CM

## 2023-07-24 RX ORDER — CIPROFLOXACIN HYDROCHLORIDE 500 MG/1
500 TABLET, FILM COATED ORAL TWICE DAILY
Qty: 14 | Refills: 0 | Status: ACTIVE | COMMUNITY
Start: 2023-07-24 | End: 1900-01-01

## 2023-07-25 ENCOUNTER — TRANSCRIPTION ENCOUNTER (OUTPATIENT)
Age: 68
End: 2023-07-25

## 2023-07-28 NOTE — ASU PATIENT PROFILE, ADULT - FALL HARM RISK - HARM RISK INTERVENTIONS

## 2023-07-28 NOTE — ASU PATIENT PROFILE, ADULT - NSICDXPASTMEDICALHX_GEN_ALL_CORE_FT
PAST MEDICAL HISTORY:  Anxiety     Bell's palsy     Edema leg     Hypertension     Hypomagnesemia     Hypothyroid     BALJINDER (obstructive sleep apnea) based on stop bang score of 6    Osteoarthritis     Parkinson disease     Rectal mass     Renal cell carcinoma     Urothelial cancer

## 2023-07-30 ENCOUNTER — TRANSCRIPTION ENCOUNTER (OUTPATIENT)
Age: 68
End: 2023-07-30

## 2023-07-31 ENCOUNTER — OUTPATIENT (OUTPATIENT)
Dept: OUTPATIENT SERVICES | Facility: HOSPITAL | Age: 68
LOS: 1 days | Discharge: HOME | End: 2023-07-31
Payer: MEDICARE

## 2023-07-31 ENCOUNTER — APPOINTMENT (OUTPATIENT)
Dept: UROLOGY | Facility: HOSPITAL | Age: 68
End: 2023-07-31

## 2023-07-31 ENCOUNTER — TRANSCRIPTION ENCOUNTER (OUTPATIENT)
Age: 68
End: 2023-07-31

## 2023-07-31 VITALS
WEIGHT: 257.94 LBS | OXYGEN SATURATION: 96 % | HEIGHT: 68 IN | TEMPERATURE: 98 F | HEART RATE: 76 BPM | DIASTOLIC BLOOD PRESSURE: 67 MMHG | SYSTOLIC BLOOD PRESSURE: 126 MMHG | RESPIRATION RATE: 16 BRPM

## 2023-07-31 VITALS
SYSTOLIC BLOOD PRESSURE: 123 MMHG | OXYGEN SATURATION: 95 % | DIASTOLIC BLOOD PRESSURE: 71 MMHG | RESPIRATION RATE: 17 BRPM | TEMPERATURE: 97 F | HEART RATE: 68 BPM

## 2023-07-31 DIAGNOSIS — Z96.89 PRESENCE OF OTHER SPECIFIED FUNCTIONAL IMPLANTS: Chronic | ICD-10-CM

## 2023-07-31 DIAGNOSIS — Z90.49 ACQUIRED ABSENCE OF OTHER SPECIFIED PARTS OF DIGESTIVE TRACT: Chronic | ICD-10-CM

## 2023-07-31 DIAGNOSIS — N20.1 CALCULUS OF URETER: ICD-10-CM

## 2023-07-31 DIAGNOSIS — Z98.890 OTHER SPECIFIED POSTPROCEDURAL STATES: Chronic | ICD-10-CM

## 2023-07-31 DIAGNOSIS — Z96.649 PRESENCE OF UNSPECIFIED ARTIFICIAL HIP JOINT: Chronic | ICD-10-CM

## 2023-07-31 PROCEDURE — 52330 CYSTOSCOPY AND TREATMENT: CPT | Mod: RT

## 2023-07-31 PROCEDURE — 52332 CYSTOSCOPY AND TREATMENT: CPT | Mod: RT

## 2023-07-31 DEVICE — URETERAL CATH OPEN END 5FR 70CM: Type: IMPLANTABLE DEVICE | Site: RIGHT | Status: FUNCTIONAL

## 2023-07-31 DEVICE — GUIDEWIRE SENSOR DUAL-FLEX NITINOL STRAIGHT .038" X 150CM: Type: IMPLANTABLE DEVICE | Site: RIGHT | Status: FUNCTIONAL

## 2023-07-31 DEVICE — STONE BASKET ZEROTIP NITINOL 4-WIRE 1.9FR 120CM X 12MM: Type: IMPLANTABLE DEVICE | Site: RIGHT | Status: FUNCTIONAL

## 2023-07-31 DEVICE — KIT URET PERFLX PLUS 6FRX26CM: Type: IMPLANTABLE DEVICE | Site: RIGHT | Status: FUNCTIONAL

## 2023-07-31 DEVICE — GUIDEWIRE SENSOR DUAL-FLEX NITINOL STRAIGHT .035" X 150CM: Type: IMPLANTABLE DEVICE | Site: RIGHT | Status: FUNCTIONAL

## 2023-07-31 RX ORDER — OXYCODONE HYDROCHLORIDE 5 MG/1
5 TABLET ORAL ONCE
Refills: 0 | Status: DISCONTINUED | OUTPATIENT
Start: 2023-07-31 | End: 2023-07-31

## 2023-07-31 RX ORDER — ACETAMINOPHEN 500 MG
1000 TABLET ORAL ONCE
Refills: 0 | Status: DISCONTINUED | OUTPATIENT
Start: 2023-07-31 | End: 2023-08-14

## 2023-07-31 RX ORDER — ONDANSETRON 8 MG/1
4 TABLET, FILM COATED ORAL ONCE
Refills: 0 | Status: DISCONTINUED | OUTPATIENT
Start: 2023-07-31 | End: 2023-08-14

## 2023-07-31 RX ORDER — CIPROFLOXACIN LACTATE 400MG/40ML
1 VIAL (ML) INTRAVENOUS
Qty: 10 | Refills: 0
Start: 2023-07-31 | End: 2023-08-04

## 2023-07-31 NOTE — PACU DISCHARGE NOTE - NSCLINEINSERTRD_GEN_ALL_CORE
Refill of:  Relion Pen Needle 32g x 4 mm  Last order:  3/21/18 for 360 needles x 1  Last appt:  11/10/18 (next appt 2/5/19 with Dr. Cerda)  Pharmacy:  [facility]   No

## 2023-07-31 NOTE — ASU DISCHARGE PLAN (ADULT/PEDIATRIC) - NS MD DC FALL RISK RISK
For information on Fall & Injury Prevention, visit: [email or web address] OR  [email or web address] OR  https://www.cdc.gov/steadi/patient.html

## 2023-07-31 NOTE — ASU PREOP CHECKLIST - COMMENTS
pt took lopdressor, synthroid, norvasc, carbidopa-levodopa, acidophilus, magnesium, losartan and atorvastatin at 4:30 am with a sip of water

## 2023-07-31 NOTE — ASU DISCHARGE PLAN (ADULT/PEDIATRIC) - ASU DC SPECIAL INSTRUCTIONSFT
PAIN CONTROL: You may take 650 mg of Tylenol every 4-6 hours. Do not exceed 4 grams of Tylenol daily.   ANTIBIOTICS: Please complete the course of antibiotics sent to your pharmacy as instructed. Take Cipro BID for 5 days.  BATHING: No restrictions  ACTIVITY: No heavy lifting or straining. Otherwise, you may return to your usual level of physical activity. If you are taking narcotic pain medication (such as oxycodone), do NOT drive a car, operate machinery or make important decisions.  DIET: Return to your usual diet.  NOTIFY YOUR SURGEON IF: You have any bleeding that does not stop, any pus draining from your wound, any fever (over 100.4 F) or chills, persistent nausea/vomiting, persistent diarrhea, or if your pain is not controlled on your discharge pain medications.  FOLLOW-UP:  1. Please call your surgeon to make a follow-up appointment within 1-2 weeks of discharge

## 2023-08-02 PROBLEM — E83.42 HYPOMAGNESEMIA: Chronic | Status: ACTIVE | Noted: 2023-07-20

## 2023-08-02 PROBLEM — C68.9 MALIGNANT NEOPLASM OF URINARY ORGAN, UNSPECIFIED: Chronic | Status: ACTIVE | Noted: 2023-07-20

## 2023-08-02 PROBLEM — C64.9 MALIGNANT NEOPLASM OF UNSPECIFIED KIDNEY, EXCEPT RENAL PELVIS: Chronic | Status: ACTIVE | Noted: 2023-07-20

## 2023-08-02 PROBLEM — G51.0 BELL'S PALSY: Chronic | Status: ACTIVE | Noted: 2023-07-20

## 2023-08-04 ENCOUNTER — APPOINTMENT (OUTPATIENT)
Dept: UROLOGY | Facility: CLINIC | Age: 68
End: 2023-08-04
Payer: MEDICARE

## 2023-08-04 DIAGNOSIS — N20.1 CALCULUS OF URETER: ICD-10-CM

## 2023-08-04 PROCEDURE — 99212 OFFICE O/P EST SF 10 MIN: CPT

## 2023-08-04 NOTE — ASSESSMENT
[FreeTextEntry1] : stent removed. stone analysis pending but appeared calcium oxalte. f/o imaging via pet scan

## 2023-08-04 NOTE — HISTORY OF PRESENT ILLNESS
[FreeTextEntry1] : referred for evaluation of renal masses. two weeks ago noted gross hematuria, intermittent but no clots. No flank pian. N/V though some "tightness" on the left ; Has h/o stones in past = nothing like it. Hd CT scan - we reviewed together  r. Noted a cm mass right kidney - extends into sinus near division of the right renal artery. imaging most c/w RCC. Left kidney with hydronephrotic pelvis, almost like a UPJ with hyperdense material on non-contrast, partly enhances . Note  LP blown out with thin parenchyma. Some enlarged nodes left side. Ct Chest negative  never smoked.   h/o removal rectal lesion with covering ileostomy - then hernia repair with mesh   62/3 at [facility] [place] keytruda  has JJ stent in the left - in APril and flank pain and increase creatinine to the 4"0s - at [facility] had NTs placed. CT didn't see obstruction and ended up with both remove. then seen last week [facility] - creatinine elevated to 2.5 and CY scan noted 6mm distal stone with hydro - had a JJs tent placed and left exchanged.  reviewed CT from [facility] - 2 small stone right - has artifact in pelvis from hip ? distal stone   8/23 S/p ureteroscopy stone extraction - JJ stent

## 2023-08-07 ENCOUNTER — NON-APPOINTMENT (OUTPATIENT)
Age: 68
End: 2023-08-07

## 2023-08-07 LAB
CELL MATERIAL STONE EST-MCNT: SIGNIFICANT CHANGE UP
LABORATORY COMMENT REPORT: SIGNIFICANT CHANGE UP
NIDUS STONE QN: SIGNIFICANT CHANGE UP

## 2023-08-19 NOTE — ASU PATIENT PROFILE, ADULT - WAS YOUR LAST COVID-19 VACCINE GREATER THAN OR EQUAL TO TWO MONTHS AGO?
Inhaler / Aerosol Education        [x] Served spacer    [x] Good return demonstration per patient   [x] Aerosolized Medications:     Verbal education has been provided in the use, benefits and possible adverse reactions of aerosolized medications used in the treatment of this patient. Yes

## 2023-09-05 ENCOUNTER — NON-APPOINTMENT (OUTPATIENT)
Age: 68
End: 2023-09-05

## 2023-09-06 ENCOUNTER — INPATIENT (INPATIENT)
Facility: HOSPITAL | Age: 68
LOS: 0 days | Discharge: HOME | End: 2023-09-07
Attending: SPECIALIST | Admitting: SPECIALIST
Payer: MEDICARE

## 2023-09-06 ENCOUNTER — NON-APPOINTMENT (OUTPATIENT)
Age: 68
End: 2023-09-06

## 2023-09-06 ENCOUNTER — TRANSCRIPTION ENCOUNTER (OUTPATIENT)
Age: 68
End: 2023-09-06

## 2023-09-06 VITALS
HEART RATE: 81 BPM | SYSTOLIC BLOOD PRESSURE: 130 MMHG | DIASTOLIC BLOOD PRESSURE: 55 MMHG | RESPIRATION RATE: 16 BRPM | HEIGHT: 68 IN | TEMPERATURE: 99 F | OXYGEN SATURATION: 97 %

## 2023-09-06 DIAGNOSIS — R10.9 UNSPECIFIED ABDOMINAL PAIN: ICD-10-CM

## 2023-09-06 DIAGNOSIS — Z98.890 OTHER SPECIFIED POSTPROCEDURAL STATES: Chronic | ICD-10-CM

## 2023-09-06 DIAGNOSIS — Z96.649 PRESENCE OF UNSPECIFIED ARTIFICIAL HIP JOINT: Chronic | ICD-10-CM

## 2023-09-06 DIAGNOSIS — N13.30 UNSPECIFIED HYDRONEPHROSIS: ICD-10-CM

## 2023-09-06 DIAGNOSIS — Z90.49 ACQUIRED ABSENCE OF OTHER SPECIFIED PARTS OF DIGESTIVE TRACT: Chronic | ICD-10-CM

## 2023-09-06 DIAGNOSIS — Z96.89 PRESENCE OF OTHER SPECIFIED FUNCTIONAL IMPLANTS: Chronic | ICD-10-CM

## 2023-09-06 LAB
ALBUMIN SERPL ELPH-MCNC: 3.8 G/DL — SIGNIFICANT CHANGE UP (ref 3.3–5)
ALP SERPL-CCNC: 73 U/L — SIGNIFICANT CHANGE UP (ref 40–120)
ALT FLD-CCNC: 7 U/L — SIGNIFICANT CHANGE UP (ref 4–41)
ANION GAP SERPL CALC-SCNC: 16 MMOL/L — HIGH (ref 7–14)
APPEARANCE UR: ABNORMAL
AST SERPL-CCNC: 12 U/L — SIGNIFICANT CHANGE UP (ref 4–40)
BACTERIA # UR AUTO: NEGATIVE /HPF — SIGNIFICANT CHANGE UP
BASOPHILS # BLD AUTO: 0.04 K/UL — SIGNIFICANT CHANGE UP (ref 0–0.2)
BASOPHILS NFR BLD AUTO: 0.3 % — SIGNIFICANT CHANGE UP (ref 0–2)
BILIRUB SERPL-MCNC: 0.6 MG/DL — SIGNIFICANT CHANGE UP (ref 0.2–1.2)
BILIRUB UR-MCNC: NEGATIVE — SIGNIFICANT CHANGE UP
BUN SERPL-MCNC: 19 MG/DL — SIGNIFICANT CHANGE UP (ref 7–23)
CALCIUM SERPL-MCNC: 9 MG/DL — SIGNIFICANT CHANGE UP (ref 8.4–10.5)
CAST: 4 /LPF — SIGNIFICANT CHANGE UP (ref 0–4)
CHLORIDE SERPL-SCNC: 97 MMOL/L — LOW (ref 98–107)
CO2 SERPL-SCNC: 21 MMOL/L — LOW (ref 22–31)
COLOR SPEC: SIGNIFICANT CHANGE UP
CREAT SERPL-MCNC: 1 MG/DL — SIGNIFICANT CHANGE UP (ref 0.5–1.3)
DIFF PNL FLD: ABNORMAL
EGFR: 82 ML/MIN/1.73M2 — SIGNIFICANT CHANGE UP
EOSINOPHIL # BLD AUTO: 0.02 K/UL — SIGNIFICANT CHANGE UP (ref 0–0.5)
EOSINOPHIL NFR BLD AUTO: 0.1 % — SIGNIFICANT CHANGE UP (ref 0–6)
GLUCOSE SERPL-MCNC: 118 MG/DL — HIGH (ref 70–99)
GLUCOSE UR QL: NEGATIVE MG/DL — SIGNIFICANT CHANGE UP
HCT VFR BLD CALC: 31.1 % — LOW (ref 39–50)
HGB BLD-MCNC: 9.6 G/DL — LOW (ref 13–17)
IANC: 11.9 K/UL — HIGH (ref 1.8–7.4)
IMM GRANULOCYTES NFR BLD AUTO: 0.4 % — SIGNIFICANT CHANGE UP (ref 0–0.9)
KETONES UR-MCNC: ABNORMAL MG/DL
LEUKOCYTE ESTERASE UR-ACNC: ABNORMAL
LYMPHOCYTES # BLD AUTO: 1.13 K/UL — SIGNIFICANT CHANGE UP (ref 1–3.3)
LYMPHOCYTES # BLD AUTO: 8.2 % — LOW (ref 13–44)
MCHC RBC-ENTMCNC: 24.8 PG — LOW (ref 27–34)
MCHC RBC-ENTMCNC: 30.9 GM/DL — LOW (ref 32–36)
MCV RBC AUTO: 80.4 FL — SIGNIFICANT CHANGE UP (ref 80–100)
MONOCYTES # BLD AUTO: 0.68 K/UL — SIGNIFICANT CHANGE UP (ref 0–0.9)
MONOCYTES NFR BLD AUTO: 4.9 % — SIGNIFICANT CHANGE UP (ref 2–14)
NEUTROPHILS # BLD AUTO: 11.9 K/UL — HIGH (ref 1.8–7.4)
NEUTROPHILS NFR BLD AUTO: 86.1 % — HIGH (ref 43–77)
NITRITE UR-MCNC: NEGATIVE — SIGNIFICANT CHANGE UP
NRBC # BLD: 0 /100 WBCS — SIGNIFICANT CHANGE UP (ref 0–0)
NRBC # FLD: 0 K/UL — SIGNIFICANT CHANGE UP (ref 0–0)
PH UR: 6 — SIGNIFICANT CHANGE UP (ref 5–8)
PLATELET # BLD AUTO: 252 K/UL — SIGNIFICANT CHANGE UP (ref 150–400)
POTASSIUM SERPL-MCNC: 3.3 MMOL/L — LOW (ref 3.5–5.3)
POTASSIUM SERPL-SCNC: 3.3 MMOL/L — LOW (ref 3.5–5.3)
PROT SERPL-MCNC: 8.3 G/DL — SIGNIFICANT CHANGE UP (ref 6–8.3)
PROT UR-MCNC: 100 MG/DL
RBC # BLD: 3.87 M/UL — LOW (ref 4.2–5.8)
RBC # FLD: 14.7 % — HIGH (ref 10.3–14.5)
RBC CASTS # UR COMP ASSIST: 1644 /HPF — HIGH (ref 0–4)
SODIUM SERPL-SCNC: 134 MMOL/L — LOW (ref 135–145)
SP GR SPEC: 1.05 — HIGH (ref 1–1.03)
SQUAMOUS # UR AUTO: 5 /HPF — SIGNIFICANT CHANGE UP (ref 0–5)
UROBILINOGEN FLD QL: 1 MG/DL — SIGNIFICANT CHANGE UP (ref 0.2–1)
WBC # BLD: 13.83 K/UL — HIGH (ref 3.8–10.5)
WBC # FLD AUTO: 13.83 K/UL — HIGH (ref 3.8–10.5)
WBC UR QL: 83 /HPF — HIGH (ref 0–5)

## 2023-09-06 PROCEDURE — 99222 1ST HOSP IP/OBS MODERATE 55: CPT

## 2023-09-06 PROCEDURE — 99285 EMERGENCY DEPT VISIT HI MDM: CPT

## 2023-09-06 RX ORDER — OXYCODONE HYDROCHLORIDE 5 MG/1
5 TABLET ORAL EVERY 6 HOURS
Refills: 0 | Status: DISCONTINUED | OUTPATIENT
Start: 2023-09-06 | End: 2023-09-06

## 2023-09-06 RX ORDER — HYDROMORPHONE HYDROCHLORIDE 2 MG/ML
1 INJECTION INTRAMUSCULAR; INTRAVENOUS; SUBCUTANEOUS ONCE
Refills: 0 | Status: DISCONTINUED | OUTPATIENT
Start: 2023-09-06 | End: 2023-09-06

## 2023-09-06 RX ORDER — SENNA PLUS 8.6 MG/1
2 TABLET ORAL AT BEDTIME
Refills: 0 | Status: DISCONTINUED | OUTPATIENT
Start: 2023-09-06 | End: 2023-09-07

## 2023-09-06 RX ORDER — KETOROLAC TROMETHAMINE 30 MG/ML
30 SYRINGE (ML) INJECTION ONCE
Refills: 0 | Status: DISCONTINUED | OUTPATIENT
Start: 2023-09-06 | End: 2023-09-06

## 2023-09-06 RX ORDER — POTASSIUM CHLORIDE 20 MEQ
40 PACKET (EA) ORAL EVERY 4 HOURS
Refills: 0 | Status: COMPLETED | OUTPATIENT
Start: 2023-09-06 | End: 2023-09-07

## 2023-09-06 RX ORDER — LOSARTAN POTASSIUM 100 MG/1
100 TABLET, FILM COATED ORAL DAILY
Refills: 0 | Status: DISCONTINUED | OUTPATIENT
Start: 2023-09-06 | End: 2023-09-07

## 2023-09-06 RX ORDER — LEVOTHYROXINE SODIUM 125 MCG
75 TABLET ORAL DAILY
Refills: 0 | Status: DISCONTINUED | OUTPATIENT
Start: 2023-09-06 | End: 2023-09-07

## 2023-09-06 RX ORDER — HYDROMORPHONE HYDROCHLORIDE 2 MG/ML
1 INJECTION INTRAMUSCULAR; INTRAVENOUS; SUBCUTANEOUS EVERY 6 HOURS
Refills: 0 | Status: DISCONTINUED | OUTPATIENT
Start: 2023-09-06 | End: 2023-09-07

## 2023-09-06 RX ORDER — KETOROLAC TROMETHAMINE 30 MG/ML
15 SYRINGE (ML) INJECTION EVERY 6 HOURS
Refills: 0 | Status: COMPLETED | OUTPATIENT
Start: 2023-09-06 | End: 2023-09-07

## 2023-09-06 RX ORDER — ASPIRIN/CALCIUM CARB/MAGNESIUM 324 MG
81 TABLET ORAL DAILY
Refills: 0 | Status: DISCONTINUED | OUTPATIENT
Start: 2023-09-06 | End: 2023-09-07

## 2023-09-06 RX ORDER — MAGNESIUM OXIDE 400 MG ORAL TABLET 241.3 MG
400 TABLET ORAL
Refills: 0 | Status: DISCONTINUED | OUTPATIENT
Start: 2023-09-06 | End: 2023-09-07

## 2023-09-06 RX ORDER — METOPROLOL TARTRATE 50 MG
50 TABLET ORAL DAILY
Refills: 0 | Status: DISCONTINUED | OUTPATIENT
Start: 2023-09-06 | End: 2023-09-07

## 2023-09-06 RX ORDER — LACTOBACILLUS ACIDOPHILUS 100MM CELL
2 CAPSULE ORAL
Refills: 0 | Status: DISCONTINUED | OUTPATIENT
Start: 2023-09-06 | End: 2023-09-07

## 2023-09-06 RX ORDER — CARBIDOPA AND LEVODOPA 25; 100 MG/1; MG/1
0.5 TABLET ORAL
Refills: 0 | Status: DISCONTINUED | OUTPATIENT
Start: 2023-09-06 | End: 2023-09-07

## 2023-09-06 RX ORDER — ATORVASTATIN CALCIUM 80 MG/1
20 TABLET, FILM COATED ORAL AT BEDTIME
Refills: 0 | Status: DISCONTINUED | OUTPATIENT
Start: 2023-09-06 | End: 2023-09-07

## 2023-09-06 RX ORDER — HEPARIN SODIUM 5000 [USP'U]/ML
5000 INJECTION INTRAVENOUS; SUBCUTANEOUS EVERY 8 HOURS
Refills: 0 | Status: DISCONTINUED | OUTPATIENT
Start: 2023-09-06 | End: 2023-09-07

## 2023-09-06 RX ORDER — TAMSULOSIN HYDROCHLORIDE 0.4 MG/1
0.8 CAPSULE ORAL AT BEDTIME
Refills: 0 | Status: DISCONTINUED | OUTPATIENT
Start: 2023-09-06 | End: 2023-09-07

## 2023-09-06 RX ORDER — AMLODIPINE BESYLATE 2.5 MG/1
10 TABLET ORAL DAILY
Refills: 0 | Status: DISCONTINUED | OUTPATIENT
Start: 2023-09-06 | End: 2023-09-07

## 2023-09-06 RX ORDER — SODIUM CHLORIDE 9 MG/ML
1000 INJECTION, SOLUTION INTRAVENOUS
Refills: 0 | Status: DISCONTINUED | OUTPATIENT
Start: 2023-09-06 | End: 2023-09-07

## 2023-09-06 RX ORDER — CIPROFLOXACIN LACTATE 400MG/40ML
400 VIAL (ML) INTRAVENOUS EVERY 12 HOURS
Refills: 0 | Status: DISCONTINUED | OUTPATIENT
Start: 2023-09-06 | End: 2023-09-07

## 2023-09-06 RX ORDER — ACETAMINOPHEN 500 MG
975 TABLET ORAL EVERY 6 HOURS
Refills: 0 | Status: DISCONTINUED | OUTPATIENT
Start: 2023-09-06 | End: 2023-09-07

## 2023-09-06 RX ADMIN — Medication 975 MILLIGRAM(S): at 17:48

## 2023-09-06 RX ADMIN — Medication 30 MILLIGRAM(S): at 20:07

## 2023-09-06 RX ADMIN — Medication 30 MILLIGRAM(S): at 20:52

## 2023-09-06 RX ADMIN — Medication 200 MILLIGRAM(S): at 23:00

## 2023-09-06 RX ADMIN — SODIUM CHLORIDE 125 MILLILITER(S): 9 INJECTION, SOLUTION INTRAVENOUS at 17:09

## 2023-09-06 RX ADMIN — TAMSULOSIN HYDROCHLORIDE 0.8 MILLIGRAM(S): 0.4 CAPSULE ORAL at 21:20

## 2023-09-06 RX ADMIN — HYDROMORPHONE HYDROCHLORIDE 1 MILLIGRAM(S): 2 INJECTION INTRAMUSCULAR; INTRAVENOUS; SUBCUTANEOUS at 16:47

## 2023-09-06 RX ADMIN — SODIUM CHLORIDE 125 MILLILITER(S): 9 INJECTION, SOLUTION INTRAVENOUS at 16:48

## 2023-09-06 RX ADMIN — CARBIDOPA AND LEVODOPA 0.5 TABLET(S): 25; 100 TABLET ORAL at 17:52

## 2023-09-06 RX ADMIN — HYDROMORPHONE HYDROCHLORIDE 1 MILLIGRAM(S): 2 INJECTION INTRAMUSCULAR; INTRAVENOUS; SUBCUTANEOUS at 16:06

## 2023-09-06 RX ADMIN — HEPARIN SODIUM 5000 UNIT(S): 5000 INJECTION INTRAVENOUS; SUBCUTANEOUS at 21:20

## 2023-09-06 RX ADMIN — ATORVASTATIN CALCIUM 20 MILLIGRAM(S): 80 TABLET, FILM COATED ORAL at 21:20

## 2023-09-06 RX ADMIN — CARBIDOPA AND LEVODOPA 0.5 TABLET(S): 25; 100 TABLET ORAL at 21:26

## 2023-09-06 RX ADMIN — HYDROMORPHONE HYDROCHLORIDE 1 MILLIGRAM(S): 2 INJECTION INTRAMUSCULAR; INTRAVENOUS; SUBCUTANEOUS at 15:20

## 2023-09-06 RX ADMIN — MAGNESIUM OXIDE 400 MG ORAL TABLET 400 MILLIGRAM(S): 241.3 TABLET ORAL at 17:47

## 2023-09-06 RX ADMIN — HYDROMORPHONE HYDROCHLORIDE 1 MILLIGRAM(S): 2 INJECTION INTRAMUSCULAR; INTRAVENOUS; SUBCUTANEOUS at 23:05

## 2023-09-06 RX ADMIN — HYDROMORPHONE HYDROCHLORIDE 1 MILLIGRAM(S): 2 INJECTION INTRAMUSCULAR; INTRAVENOUS; SUBCUTANEOUS at 22:13

## 2023-09-06 RX ADMIN — Medication 40 MILLIEQUIVALENT(S): at 21:20

## 2023-09-06 NOTE — ED PROVIDER NOTE - PHYSICAL EXAMINATION
Tammy Neal MD  GENERAL: Patient awake alert +moderate distress 2/2 pain  HEENT: NC/AT, Moist mucous membranes, EOMI.  LUNGS: CTAB, no wheezes or crackles.   CARDIAC: RRR, no m/r/g.    ABDOMEN: Soft, +tender L abd/L flank, ND, No rebound, guarding.   EXT: No edema. No calf tenderness. CV 2+DP/PT bilaterally.   MSK: no deformities.  NEURO: A&Ox3. Moving all extremities.  SKIN: Warm and dry. No rash.  PSYCH: Normal affect.

## 2023-09-06 NOTE — H&P ADULT - PROBLEM SELECTOR PLAN 1
Admit  Pain Control  Cipro  Med Clearance for OR  NPO after midnight for OR Thursday  OR Thursday Add On for Cysto Left Ureteroscopy L stent exchange   D/ W Dr. Hernandez

## 2023-09-06 NOTE — CHART NOTE - NSCHARTNOTEFT_GEN_A_CORE
This is a 68 year old male with metastatic urothelial cancer and papillary thyroid cancer. [place] [facility] [place] pembrolizumab, last dose 08/23. s/p L urothelial stent in place, h/o R stent removed in August. Also has h/o kidney stones.  Pt sent here from [facility] for left flank pain. He had CT a/p done, impression as below. UA was positive in clinic.    If patient is admitted to medicine, please admit to Dr. Heri Agustin who is aware of patient.    Zohra Castillo PA-C  Hematology/Oncology  [facility]  [phone] (office)  [phone] (alt office)  Evenings and weekends please call MD on call or office         CT (Abdomen/Pelvis w/ Con) - 09.06.23 @ 10:52 ***  1. Since June 25, 2023, proximal migration of left ureter stent with  distal tip now situated in the distal ureter and proximal tip situated in  the left upper pole. Increased mild left hydronephrosis.  2. Increased left lower pole predominantly cystic mass with increased  hyperdense/soft tissue components, probably malignant with associated  hemorrhagic/proteinaceous components. New adjacent left perinephric  fluid/fat stranding with some areas higher than simple fluid, possibly due  to partial rupture of the cystic mass.  3. Known left renal pelvic infiltrative urothelial mass, difficult to  measure but probably increased.  4. Unchanged right renal malignant mass.  5. Unchanged retroperitoneal adenopathy.  6. Unchanged ill-defined pelvic mass abutting rectum and prostate/seminal  vesicles, indeterminate.  7. Slight decreased retrocrural adenopathy.

## 2023-09-06 NOTE — ED PROVIDER NOTE - ATTENDING CONTRIBUTION TO CARE
I performed a face-to-face evaluation of the patient and performed a history and physical examination. I agree with the history and physical examination. If this was a PA visit, I personally saw the patient with the PA and performed a substantive portion of the visit including all aspects of the medical decision making.    Kidney cancer. Left ureteral stent migrated superiorly toward kidney. Was sent here to see urology. Pain meds. Uro consult.

## 2023-09-06 NOTE — ED PROVIDER NOTE - CLINICAL SUMMARY MEDICAL DECISION MAKING FREE TEXT BOX
Dana: Kidney cancer. Left ureteral stent migrated superiorly toward kidney. Was sent here to see urology. Pain meds. Uro consult.

## 2023-09-06 NOTE — H&P ADULT - HISTORY OF PRESENT ILLNESS
68 year old male with metastatic Left urothelial cancer, R RCC, and papillary thyroid cancer. He follows at [facility], is currently on pembrolizumab, last dose 08/23. He has hx of L hydronephrosis and has a L ureteral stent in place,  Pt had R sided kidney stones and last month underwent R ureteroscopy and stone removal with Dr. Hernandez at [facility] in August.  Pt developed acute onset of L flank pain this AM and went to [facility] where he had a CT scan which revealed that his L stent had migrated proximally and was causing worsening L hydronephrosis.  Pt transferred from [facility] for L URS stent exchange with Dr. Hernandez.  Pt denies fever, chills, N/V, CP SOB.

## 2023-09-06 NOTE — ED ADULT TRIAGE NOTE - CHIEF COMPLAINT QUOTE
Pt brought in by EMS from [facility] for eval, Pt complaining  of L flank pain. Pt has kidney ca and had a sent placed and is concerned the stent has moved. Pt was given 0.2 mg Dilaudid. Pt denies chest pain, sob, n/v/d, fever or chills.

## 2023-09-06 NOTE — ED PROVIDER NOTE - OBJECTIVE STATEMENT
Pt is a 67 yo M with a h/o metastatic urothelial cancer and papillary thyroid cancer who presents to the ED with L flank pain. Pt had hematuria earlier that self resolved, then started having severe L flank pain. denies fevers, chills, N/V. Outpt CT shows migrated stent. urology at bedside.

## 2023-09-06 NOTE — ED ADULT NURSE NOTE - OBJECTIVE STATEMENT
pt received to intake. pt is Axo 3 and ambulatory. Pt was brought in by EMS from [facility] for further evaluation for his left sided flank pain. Pt states he was receiving treatment today and began to have left kidney discomfort. Pt endorses hx of kidney cancer. Pt states he has a stent placed tot he left kidney, and is afraid  the stent has moved. pt states he was given several rounds of 0.2 mg of Dilaudid at the center, but did not help him. pt has a left chest wall port that has been accessed by the center. Provider to RN placed to use Port. Respirations are even and unlabored. pt denies chest pain, headaches dizziness, N/V/D, SOB, abdominal pain, or fevers. Labs obtained, and pt medicated as prescribed. Plan of care ongoing.

## 2023-09-06 NOTE — ED ADULT NURSE NOTE - NSFALLUNIVINTERV_ED_ALL_ED
Bed/Stretcher in lowest position, wheels locked, appropriate side rails in place/Call bell, personal items and telephone in reach/Instruct patient to call for assistance before getting out of bed/chair/stretcher/Non-slip footwear applied when patient is off stretcher/Worth to call system/Physically safe environment - no spills, clutter or unnecessary equipment/Purposeful proactive rounding/Room/bathroom lighting operational, light cord in reach

## 2023-09-06 NOTE — ED ADULT TRIAGE NOTE - TEMPERATURE IN FAHRENHEIT (DEGREES F)
THANK YOU FROM YOUR CARE TEAM    Our staff would like to THANK YOU for choosing [facility]. Our goal is to always provide you with the best of care and we continue to look for better ways to improve the services we provide.     You may receive a survey in the mail with questions specific to your encounter with our clinic. Should you receive a survey, please take a few minutes to rate your experience.   Our providers and staff value your feedback.  Thank you in advance for your time and participation.     We appreciate the opportunity to partner with you to meet your health care needs. THANK YOU, again, for choosing us to be your care team.     Medical Assistant: Carey  Provider: Cassie Harvey MD, Medical Director Back & Spine  Care Coordinator:  Tabitha SPENCER RN     [facility]  [address] [place] [address], [address]  [place] [address]  Phone:  [phone]  Fax:  [phone]      Angela Duran, Manager Clinic operations                                              ...    
[phone]

## 2023-09-07 ENCOUNTER — TRANSCRIPTION ENCOUNTER (OUTPATIENT)
Age: 68
End: 2023-09-07

## 2023-09-07 VITALS
SYSTOLIC BLOOD PRESSURE: 97 MMHG | DIASTOLIC BLOOD PRESSURE: 57 MMHG | HEART RATE: 73 BPM | OXYGEN SATURATION: 95 % | RESPIRATION RATE: 17 BRPM

## 2023-09-07 LAB
ANION GAP SERPL CALC-SCNC: 13 MMOL/L — SIGNIFICANT CHANGE UP (ref 7–14)
BUN SERPL-MCNC: 23 MG/DL — SIGNIFICANT CHANGE UP (ref 7–23)
CALCIUM SERPL-MCNC: 8.3 MG/DL — LOW (ref 8.4–10.5)
CHLORIDE SERPL-SCNC: 100 MMOL/L — SIGNIFICANT CHANGE UP (ref 98–107)
CO2 SERPL-SCNC: 19 MMOL/L — LOW (ref 22–31)
CREAT SERPL-MCNC: 1.09 MG/DL — SIGNIFICANT CHANGE UP (ref 0.5–1.3)
EGFR: 74 ML/MIN/1.73M2 — SIGNIFICANT CHANGE UP
GLUCOSE SERPL-MCNC: 140 MG/DL — HIGH (ref 70–99)
HCT VFR BLD CALC: 26.3 % — LOW (ref 39–50)
HGB BLD-MCNC: 8.3 G/DL — LOW (ref 13–17)
MAGNESIUM SERPL-MCNC: 1.5 MG/DL — LOW (ref 1.6–2.6)
MCHC RBC-ENTMCNC: 25.1 PG — LOW (ref 27–34)
MCHC RBC-ENTMCNC: 31.6 GM/DL — LOW (ref 32–36)
MCV RBC AUTO: 79.5 FL — LOW (ref 80–100)
NRBC # BLD: 0 /100 WBCS — SIGNIFICANT CHANGE UP (ref 0–0)
NRBC # FLD: 0 K/UL — SIGNIFICANT CHANGE UP (ref 0–0)
PHOSPHATE SERPL-MCNC: 2.8 MG/DL — SIGNIFICANT CHANGE UP (ref 2.5–4.5)
PLATELET # BLD AUTO: 322 K/UL — SIGNIFICANT CHANGE UP (ref 150–400)
POTASSIUM SERPL-MCNC: 3.6 MMOL/L — SIGNIFICANT CHANGE UP (ref 3.5–5.3)
POTASSIUM SERPL-SCNC: 3.6 MMOL/L — SIGNIFICANT CHANGE UP (ref 3.5–5.3)
RBC # BLD: 3.31 M/UL — LOW (ref 4.2–5.8)
RBC # FLD: 14.7 % — HIGH (ref 10.3–14.5)
SODIUM SERPL-SCNC: 132 MMOL/L — LOW (ref 135–145)
WBC # BLD: 17.5 K/UL — HIGH (ref 3.8–10.5)
WBC # FLD AUTO: 17.5 K/UL — HIGH (ref 3.8–10.5)

## 2023-09-07 PROCEDURE — 52352 CYSTOURETERO W/STONE REMOVE: CPT | Mod: LT

## 2023-09-07 PROCEDURE — 93010 ELECTROCARDIOGRAM REPORT: CPT

## 2023-09-07 DEVICE — URETERAL STENT PERCUFLEX PLUS 6FR 26CM: Type: IMPLANTABLE DEVICE | Site: LEFT | Status: FUNCTIONAL

## 2023-09-07 DEVICE — URETERAL CATH OPEN END 5FR 70CM: Type: IMPLANTABLE DEVICE | Site: LEFT | Status: FUNCTIONAL

## 2023-09-07 DEVICE — GUIDEWIRE SENSOR DUAL-FLEX NITINOL STRAIGHT .038" X 150CM: Type: IMPLANTABLE DEVICE | Site: LEFT | Status: FUNCTIONAL

## 2023-09-07 RX ORDER — ACETAMINOPHEN 500 MG
3 TABLET ORAL
Qty: 0 | Refills: 0 | DISCHARGE
Start: 2023-09-07

## 2023-09-07 RX ORDER — MAGNESIUM SULFATE 500 MG/ML
2 VIAL (ML) INJECTION ONCE
Refills: 0 | Status: COMPLETED | OUTPATIENT
Start: 2023-09-07 | End: 2023-09-07

## 2023-09-07 RX ORDER — CIPROFLOXACIN LACTATE 400MG/40ML
1 VIAL (ML) INTRAVENOUS
Qty: 6 | Refills: 0
Start: 2023-09-07 | End: 2023-09-09

## 2023-09-07 RX ORDER — SENNA PLUS 8.6 MG/1
2 TABLET ORAL
Qty: 0 | Refills: 0 | DISCHARGE
Start: 2023-09-07

## 2023-09-07 RX ORDER — HYDROMORPHONE HYDROCHLORIDE 2 MG/ML
1 INJECTION INTRAMUSCULAR; INTRAVENOUS; SUBCUTANEOUS ONCE
Refills: 0 | Status: DISCONTINUED | OUTPATIENT
Start: 2023-09-07 | End: 2023-09-07

## 2023-09-07 RX ORDER — HYDROMORPHONE HYDROCHLORIDE 2 MG/ML
0.5 INJECTION INTRAMUSCULAR; INTRAVENOUS; SUBCUTANEOUS
Refills: 0 | Status: DISCONTINUED | OUTPATIENT
Start: 2023-09-07 | End: 2023-09-07

## 2023-09-07 RX ORDER — SODIUM CHLORIDE 9 MG/ML
1000 INJECTION, SOLUTION INTRAVENOUS
Refills: 0 | Status: DISCONTINUED | OUTPATIENT
Start: 2023-09-07 | End: 2023-09-07

## 2023-09-07 RX ORDER — ACETAMINOPHEN 500 MG
1000 TABLET ORAL ONCE
Refills: 0 | Status: DISCONTINUED | OUTPATIENT
Start: 2023-09-07 | End: 2023-09-07

## 2023-09-07 RX ADMIN — HEPARIN SODIUM 5000 UNIT(S): 5000 INJECTION INTRAVENOUS; SUBCUTANEOUS at 05:10

## 2023-09-07 RX ADMIN — HYDROMORPHONE HYDROCHLORIDE 1 MILLIGRAM(S): 2 INJECTION INTRAMUSCULAR; INTRAVENOUS; SUBCUTANEOUS at 06:10

## 2023-09-07 RX ADMIN — Medication 975 MILLIGRAM(S): at 14:00

## 2023-09-07 RX ADMIN — HYDROMORPHONE HYDROCHLORIDE 1 MILLIGRAM(S): 2 INJECTION INTRAMUSCULAR; INTRAVENOUS; SUBCUTANEOUS at 05:13

## 2023-09-07 RX ADMIN — SODIUM CHLORIDE 125 MILLILITER(S): 9 INJECTION, SOLUTION INTRAVENOUS at 09:55

## 2023-09-07 RX ADMIN — Medication 25 GRAM(S): at 09:54

## 2023-09-07 RX ADMIN — Medication 975 MILLIGRAM(S): at 05:11

## 2023-09-07 RX ADMIN — HYDROMORPHONE HYDROCHLORIDE 1 MILLIGRAM(S): 2 INJECTION INTRAMUSCULAR; INTRAVENOUS; SUBCUTANEOUS at 12:10

## 2023-09-07 RX ADMIN — AMLODIPINE BESYLATE 10 MILLIGRAM(S): 2.5 TABLET ORAL at 05:10

## 2023-09-07 RX ADMIN — CARBIDOPA AND LEVODOPA 0.5 TABLET(S): 25; 100 TABLET ORAL at 05:11

## 2023-09-07 RX ADMIN — Medication 15 MILLIGRAM(S): at 01:10

## 2023-09-07 RX ADMIN — LOSARTAN POTASSIUM 100 MILLIGRAM(S): 100 TABLET, FILM COATED ORAL at 05:10

## 2023-09-07 RX ADMIN — Medication 300 UNIT(S): at 19:40

## 2023-09-07 RX ADMIN — Medication 15 MILLIGRAM(S): at 02:10

## 2023-09-07 RX ADMIN — Medication 975 MILLIGRAM(S): at 13:03

## 2023-09-07 RX ADMIN — Medication 200 MILLIGRAM(S): at 05:11

## 2023-09-07 RX ADMIN — Medication 81 MILLIGRAM(S): at 13:03

## 2023-09-07 RX ADMIN — Medication 975 MILLIGRAM(S): at 06:03

## 2023-09-07 RX ADMIN — Medication 15 MILLIGRAM(S): at 08:46

## 2023-09-07 RX ADMIN — Medication 75 MICROGRAM(S): at 06:50

## 2023-09-07 RX ADMIN — CARBIDOPA AND LEVODOPA 0.5 TABLET(S): 25; 100 TABLET ORAL at 13:03

## 2023-09-07 RX ADMIN — Medication 15 MILLIGRAM(S): at 08:07

## 2023-09-07 RX ADMIN — HYDROMORPHONE HYDROCHLORIDE 1 MILLIGRAM(S): 2 INJECTION INTRAMUSCULAR; INTRAVENOUS; SUBCUTANEOUS at 11:41

## 2023-09-07 RX ADMIN — HYDROMORPHONE HYDROCHLORIDE 1 MILLIGRAM(S): 2 INJECTION INTRAMUSCULAR; INTRAVENOUS; SUBCUTANEOUS at 14:49

## 2023-09-07 RX ADMIN — Medication 50 MILLIGRAM(S): at 05:11

## 2023-09-07 RX ADMIN — Medication 40 MILLIEQUIVALENT(S): at 01:10

## 2023-09-07 NOTE — DISCHARGE NOTE PROVIDER - NSDCMRMEDTOKEN_GEN_ALL_CORE_FT
acetaminophen 325 mg oral tablet: 3 tab(s) orally every 6 hours as needed for mild to moderate pain  Acidophilus oral tablet: 2 tab(s) orally 2 times a day  amLODIPine 10 mg oral tablet: 1 tab(s) orally once a day  aspirin 81 mg oral tablet: 1 tab(s) orally once a day  atorvastatin 20 mg oral tablet: 1 tab(s) orally once a day  carbidopa-levodopa 25 mg-250 mg oral tablet: 0.5 tab(s) orally 4 times a day  Flomax 0.4 mg oral capsule: 2 cap(s) orally once a day (at bedtime)  levothyroxine 75 mcg (0.075 mg) oral tablet: 1 tab(s) orally once a day  losartan 100 mg oral tablet: 1 tab(s) orally once a day  magnesium oxide 400 mg oral tablet: 1 tab(s) orally 3 times a day  metoprolol succinate 50 mg oral tablet, extended release: 1 tab(s) orally once a day  senna leaf extract oral tablet: 2 tab(s) orally once a day (at bedtime) As needed Constipation   acetaminophen 325 mg oral tablet: 3 tab(s) orally every 6 hours as needed for pain  Acidophilus oral tablet: 2 tab(s) orally 2 times a day  amLODIPine 10 mg oral tablet: 1 tab(s) orally once a day  aspirin 81 mg oral tablet: 1 tab(s) orally once a day  atorvastatin 20 mg oral tablet: 1 tab(s) orally once a day  carbidopa-levodopa 25 mg-250 mg oral tablet: 0.5 tab(s) orally 4 times a day  ciprofloxacin 500 mg oral tablet: 1 tab(s) orally 2 times a day  Flomax 0.4 mg oral capsule: 2 cap(s) orally once a day (at bedtime)  levothyroxine 75 mcg (0.075 mg) oral tablet: 1 tab(s) orally once a day  losartan 100 mg oral tablet: 1 tab(s) orally once a day  magnesium oxide 400 mg oral tablet: 1 tab(s) orally 3 times a day  metoprolol succinate 50 mg oral tablet, extended release: 1 tab(s) orally once a day  senna leaf extract oral tablet: 2 tab(s) orally once a day (at bedtime) As needed Constipation

## 2023-09-07 NOTE — CONSULT NOTE ADULT - SUBJECTIVE AND OBJECTIVE BOX
Patient is a 68 year old male with metastatic Left urothelial cancer, R RCC, and papillary thyroid cancer. [place] [facility] [place]emb[place], last dose 08/23. He has hx of L hydronephrosis and has a L ureteral stent in place,  Pt had R sided kidney stones and last month underwent R ureteroscopy and stone removal with Dr. Hernandez at [facility] in August.  Pt developed acute onset of L flank pain this AM and went [place] [facility] [place] CT scan which revealed that his L stent had migrated proximally and was causing worsening L hydronephrosis.      Subjective: Patient seen and examined. No new events except as noted.   L flank pain       REVIEW OF SYSTEMS:    CONSTITUTIONAL: No weakness, fevers or chills  EYES/ENT: No visual changes;  No vertigo or throat pain   NECK: No pain or stiffness  RESPIRATORY: No cough, wheezing, hemoptysis; No shortness of breath  CARDIOVASCULAR: No chest pain or palpitations  GASTROINTESTINAL: No abdominal or epigastric pain. No nausea, vomiting, or hematemesis; No diarrhea or constipation. No melena or hematochezia.  GENITOURINARY: Flank pain   NEUROLOGICAL: No numbness or weakness  SKIN: No itching, burning, rashes, or lesions   All other review of systems is negative unless indicated above.    MEDICATIONS:  MEDICATIONS  (STANDING):  acetaminophen     Tablet .. 975 milliGRAM(s) Oral every 6 hours  amLODIPine   Tablet 10 milliGRAM(s) Oral daily  aspirin enteric coated 81 milliGRAM(s) Oral daily  atorvastatin 20 milliGRAM(s) Oral at bedtime  carbidopa/levodopa  25/250 0.5 Tablet(s) Oral four times a day  ciprofloxacin   IVPB 400 milliGRAM(s) IV Intermittent every 12 hours  dextrose 5% + sodium chloride 0.9%. 1000 milliLiter(s) (125 mL/Hr) IV Continuous <Continuous>  heparin   Injectable 5000 Unit(s) SubCutaneous every 8 hours  ketorolac   Injectable 15 milliGRAM(s) IV Push every 6 hours  lactobacillus acidophilus 2 Tablet(s) Oral two times a day with meals  levothyroxine 75 MICROGram(s) Oral daily  losartan 100 milliGRAM(s) Oral daily  magnesium oxide 400 milliGRAM(s) Oral three times a day with meals  metoprolol succinate ER 50 milliGRAM(s) Oral daily  tamsulosin 0.8 milliGRAM(s) Oral at bedtime    Home Medications:  Acidophilus oral tablet: 2 tab(s) orally 2 times a day (06 Sep 2023 15:55)  amLODIPine 10 mg oral tablet: 1 tab(s) orally once a day (06 Sep 2023 15:55)  aspirin 81 mg oral tablet: 1 tab(s) orally once a day (06 Sep 2023 15:55)  atorvastatin 20 mg oral tablet: 1 tab(s) orally once a day (06 Sep 2023 15:55)  carbidopa-levodopa 25 mg-250 mg oral tablet: 0.5 tab(s) orally 4 times a day (06 Sep 2023 15:55)  Flomax 0.4 mg oral capsule: 2 cap(s) orally once a day (at bedtime) (06 Sep 2023 15:55)  levothyroxine 75 mcg (0.075 mg) oral tablet: 1 tab(s) orally once a day (06 Sep 2023 15:55)  losartan 100 mg oral tablet: 1 tab(s) orally once a day (06 Sep 2023 15:55)  magnesium oxide 400 mg oral tablet: 1 tab(s) orally 3 times a day (06 Sep 2023 15:55)  metoprolol succinate 50 mg oral tablet, extended release: 1 tab(s) orally once a day (06 Sep 2023 15:55)    PAST MEDICAL & SURGICAL HISTORY:  Parkinson disease      Hypertension      Hypothyroid      Anxiety      Rectal mass      Osteoarthritis      Edema leg      BALJINDER (obstructive sleep apnea)  based on stop bang score of 6      Renal cell carcinoma      Urothelial cancer      Bell's palsy      Hypomagnesemia      History of hip replacement  right      H/O resection of rectum  large mass, was non-cancerous      H/O ileostomy  reversed 12/2014      H/O laminectomy  lumbar x2      S/P hernia repair  incisional      History of reversal of ileostomy      S/P deep brain stimulator placement            PHYSICAL EXAM:  T(C): 36.8 (09-07-23 @ 15:16), Max: 37.3 (09-07-23 @ 05:25)  HR: 71 (09-07-23 @ 15:16) (65 - 88)  BP: 130/62 (09-07-23 @ 15:16) (103/53 - 130/62)  RR: 17 (09-07-23 @ 15:16) (17 - 18)  SpO2: 92% (09-07-23 @ 15:16) (92% - 98%)  Wt(kg): --  I&O's Summary    06 Sep 2023 07:01  -  07 Sep 2023 07:00  --------------------------------------------------------  IN: 50 mL / OUT: 150 mL / NET: -100 mL    07 Sep 2023 07:01  -  07 Sep 2023 15:41  --------------------------------------------------------  IN: 0 mL / OUT: 226 mL / NET: -226 mL      Height (cm): 172.7 (09-07 @ 15:26)  Weight (kg): 115.7 (09-07 @ 14:39)  BMI (kg/m2): 38.8 (09-07 @ 15:26)  BSA (m2): 2.27 (09-07 @ 15:26)    Appearance: Normal	  HEENT:  PERRLA   Lymphatic: No lymphadenopathy   Cardiovascular: Normal S1 S2, no JVD  Respiratory: normal effort , clear  Gastrointestinal:  Soft, Non-tender  Skin: No rashes,  warm to touch  Psychiatry:  Mood & affect appropriate  Musculuskeletal: No edema    recent labs, Imaging and EKGs personally reviewed       09-06-23 @ 07:01  -  09-07-23 @ 07:00  --------------------------------------------------------  IN: 50 mL / OUT: 150 mL / NET: -100 mL    09-07-23 @ 07:01  -  09-07-23 @ 15:41  --------------------------------------------------------  IN: 0 mL / OUT: 226 mL / NET: -226 mL                          8.3    17.50 )-----------( 322      ( 07 Sep 2023 06:09 )             26.3               09-07    132<L>  |  100  |  23  ----------------------------<  140<H>  3.6   |  19<L>  |  1.09    Ca    8.3<L>      07 Sep 2023 06:09  Phos  2.8     09-07  Mg     1.50     09-07    TPro  8.3  /  Alb  3.8  /  TBili  0.6  /  DBili  x   /  AST  12  /  ALT  7   /  AlkPhos  73  09-06                       Urinalysis Basic - ( 07 Sep 2023 06:09 )    Color: x / Appearance: x / SG: x / pH: x  Gluc: 140 mg/dL / Ketone: x  / Bili: x / Urobili: x   Blood: x / Protein: x / Nitrite: x   Leuk Esterase: x / RBC: x / WBC x   Sq Epi: x / Non Sq Epi: x / Bacteria: x              
Reason for consult: metastatic urothelial carcinoma     HPI:  68 year old male with metastatic Left urothelial cancer, R RCC, and papillary thyroid cancer. He follows at [facility] [place], is currently on pembrolizumab, last dose 08/23. He has hx of L hydronephrosis and has a L ureteral stent in place,  Pt had R sided kidney stones and last month underwent R ureteroscopy and stone removal with Dr. Hernandez at [facility] in August.  Pt developed acute onset of L flank pain this AM and went to [facility] [place] [place] where he had a CT scan which revealed that his L stent had migrated proximally and was causing worsening L hydronephrosis.  Pt transferred from [facility] [place] [place] for L URS stent exchange with Dr. Hernandez.  Pt denies fever, chills, N/V, CP SOB.   (06 Sep 2023 15:56)    Hematology/Oncology consulted on this 68 year old male with metastatic urothelial cancer and papillary thyroid cancer who presents with left flank pain. CT done at [facility] [place] [place] shows proximal migration of L ureteral stent causing worsening hydronephrosis.  Pt is under care of Dr. Regis Lazcano at [facility] for management of his urothelial carcinoma, is currently on pembrolizumab, last dose 08/23. s/p L urothelial stent in place, h/o R stent removed in August. Also has h/o kidney stones.   Patient seen this afternoon, with his wife at bedside. He continues to have left flank pain, awaiting stent removal/exchange later today with urology.     PAST MEDICAL & SURGICAL HISTORY:  Parkinson disease      Hypertension      Hypothyroid      Anxiety      Rectal mass      Osteoarthritis      Edema leg      BALJINDER (obstructive sleep apnea)  based on stop bang score of 6      Renal cell carcinoma      Urothelial cancer      Bell's palsy      Hypomagnesemia      History of hip replacement  right      H/O resection of rectum  large mass, was non-cancerous      H/O ileostomy  reversed 12/2014      H/O laminectomy  lumbar x2      S/P hernia repair  incisional      History of reversal of ileostomy      S/P deep brain stimulator placement          FAMILY HISTORY:      Alochol: Denied  Smoking: Nonsmoker  Drug Use: Denied  Marital Status:         Allergies    No Known Allergies    Intolerances        MEDICATIONS  (STANDING):  acetaminophen     Tablet .. 975 milliGRAM(s) Oral every 6 hours  amLODIPine   Tablet 10 milliGRAM(s) Oral daily  aspirin enteric coated 81 milliGRAM(s) Oral daily  atorvastatin 20 milliGRAM(s) Oral at bedtime  carbidopa/levodopa  25/250 0.5 Tablet(s) Oral four times a day  ciprofloxacin   IVPB 400 milliGRAM(s) IV Intermittent every 12 hours  dextrose 5% + sodium chloride 0.9%. 1000 milliLiter(s) (125 mL/Hr) IV Continuous <Continuous>  heparin   Injectable 5000 Unit(s) SubCutaneous every 8 hours  ketorolac   Injectable 15 milliGRAM(s) IV Push every 6 hours  lactobacillus acidophilus 2 Tablet(s) Oral two times a day with meals  levothyroxine 75 MICROGram(s) Oral daily  losartan 100 milliGRAM(s) Oral daily  magnesium oxide 400 milliGRAM(s) Oral three times a day with meals  metoprolol succinate ER 50 milliGRAM(s) Oral daily  tamsulosin 0.8 milliGRAM(s) Oral at bedtime    MEDICATIONS  (PRN):  HYDROmorphone  Injectable 1 milliGRAM(s) IV Push every 6 hours PRN Severe Pain (7 - 10)  senna 2 Tablet(s) Oral at bedtime PRN Constipation      ROS  No fever, sweats, chills  No epistaxis, HA, sore throat  No CP, SOB, cough, sputum  No n/v/d, melena, hematochezia  +left side/flank pain  No edema  No rash  No anxiety  No bleeding, bruising  No dysuria, hematuria    T(C): 36.8 (09-07-23 @ 15:16), Max: 37.3 (09-07-23 @ 05:25)  HR: 71 (09-07-23 @ 15:16) (65 - 88)  BP: 130/62 (09-07-23 @ 15:16) (103/53 - 130/62)  RR: 17 (09-07-23 @ 15:16) (17 - 18)  SpO2: 92% (09-07-23 @ 15:16) (92% - 98%)  Wt(kg): --    PE  intermittently wincing in pain   Awake, alert  Anicteric, MMM  Abd soft, NT, ND  No c/c/e  No rash grossly                            8.3    17.50 )-----------( 322      ( 07 Sep 2023 06:09 )             26.3       09-07    132<L>  |  100  |  23  ----------------------------<  140<H>  3.6   |  19<L>  |  1.09    Ca    8.3<L>      07 Sep 2023 06:09  Phos  2.8     09-07  Mg     1.50     09-07    TPro  8.3  /  Alb  3.8  /  TBili  0.6  /  DBili  x   /  AST  12  /  ALT  7   /  AlkPhos  73  09-06

## 2023-09-07 NOTE — BRIEF OPERATIVE NOTE - NSICDXBRIEFPROCEDURE_GEN_ALL_CORE_FT
PROCEDURES:  Cystoscopic removal of ureteral stent 07-Sep-2023 16:38:31  Dianna Hidalgo  Cystoscopy with left ureteroscopy 07-Sep-2023 16:38:39  Dianna Hidalgo

## 2023-09-07 NOTE — DISCHARGE NOTE NURSING/CASE MANAGEMENT/SOCIAL WORK - PATIENT PORTAL LINK FT
You can access the FollowMyHealth Patient Portal offered by [facility] by registering at the following website: [email or web address]. By joining [employer]’s FollowMyHealth portal, you will also be able to view your health information using other applications (apps) compatible with our system.

## 2023-09-07 NOTE — CONSULT NOTE ADULT - NS ATTEND AMEND GEN_ALL_CORE FT
Planned for ureteral stent removal today, continue empiric cipro.  Recommend pain control with dilaudid for now, hold off on toradol.

## 2023-09-07 NOTE — PROGRESS NOTE ADULT - SUBJECTIVE AND OBJECTIVE BOX
Overnight events:  None    Subjective:  Pt c/o slight left flank discomfort    Objective:    Vital signs  T(C): , Max: 37.3 (09-07-23 @ 05:25)  HR: 77 (09-07-23 @ 05:25)  BP: 116/73 (09-07-23 @ 05:25)  SpO2: 95% (09-07-23 @ 05:25)  Wt(kg): --    Output   Void: 159  09-06 @ 07:01  -  09-07 @ 07:00  --------------------------------------------------------  IN: 50 mL / OUT: 150 mL / NET: -100 mL        Gen: NAD  Abd: soft, notender  : voiding    Labs                        8.3    17.50 )-----------( 322      ( 07 Sep 2023 06:09 )             26.3     07 Sep 2023 06:09    132    |  100    |  23     ----------------------------<  140    3.6     |  19     |  1.09     Ca    8.3        07 Sep 2023 06:09  Phos  2.8       07 Sep 2023 06:09  Mg     1.50      07 Sep 2023 06:09

## 2023-09-07 NOTE — DISCHARGE NOTE PROVIDER - HOSPITAL COURSE
68 y male parkinsons, admitted 9/6/2023 with displaced left ureteral stent underwent URS and stent removal. 68 y male parkinsons, admitted 9/6/2023 with displaced left ureteral stent underwent URS and stent removal. Postoperative course uneventful, pt d/c on cipro to f/u with Dr. Hernandez.  Jerrell checked

## 2023-09-07 NOTE — DISCHARGE NOTE NURSING/CASE MANAGEMENT/SOCIAL WORK - NSDCPNINST_GEN_ALL_CORE
Anesthesia Precautions:  For Next 24 Hours DO NOT: a. Drive a car, operate power tools or machinery. b. Drink alcohol, beer, or wine. c. Make important personal or business decisions.  d. If you had any type of sedation, you may experience lightheadedness, dizziness, or sleepiness following your procedure. A responsible adult should stay with you for at least 24 hours following your procedure.     DO NOT take any Tylenol (Acetaminophen) or narcotics containing Tylenol until after  7:00PM. You received Tylenol during your operation and it can cause damage to your liver if too much is taken within a 24 hour time period.

## 2023-09-07 NOTE — DISCHARGE NOTE NURSING/CASE MANAGEMENT/SOCIAL WORK - NSDCPEFALRISK_GEN_ALL_CORE
For information on Fall & Injury Prevention, visit: [email or web address] OR  [email or web address] OR  https://www.cdc.gov/steadi/patient.html

## 2023-09-07 NOTE — DISCHARGE NOTE PROVIDER - NSDCCPCAREPLAN_GEN_ALL_CORE_FT
PRINCIPAL DISCHARGE DIAGNOSIS  Diagnosis: Lt flank pain  Assessment and Plan of Treatment: Discharge Instructions: Ureteroscopy  · General: It is common to have blood in the urine after your procedure. It may be pink or even red; inform your doctor if you have a significant amount of clot in the urine or if you are unable to void at all. The urine may clear and then become bloody again especially as you are more physically active.  · Bathing: You may shower or bathe.  · Diet: You may resume your regular diet and regular medication regimen.  · Pain: You may take Tylenol (acetaminophen) 650-975mg available over the counter, for pain every 6 hours as needed. Do not exceed 4000 milligrams of Tylenol (acetaminophen) daily.   · Antibiotics: You have been given a prescription for an antibiotic, please take this medication as instructed and be sure to complete entire course.  · Stool softeners: Do not allow yourself to become constipated as this may increase your bother from the stent and/or straining may cause bleeding. Take stool softeners (ex. Colace) or a laxative (ex. Senekot, ExLax), available over the counter, if needed.  · Activity: No heavy lifting or strenuous exercise until you are evaluated at your post-operative appointment. Otherwise, you may return to your usual level of activity.  · Follow-up: Dr. Hernandez's office will call you with a follow up appointment        SECONDARY DISCHARGE DIAGNOSES  Diagnosis: Parkinsons  Assessment and Plan of Treatment: Continue current home medications and follow up with your primary care provider      Diagnosis: Urothelial cancer  Assessment and Plan of Treatment: Follow up with Dr. Lazcano

## 2023-09-07 NOTE — CONSULT NOTE ADULT - ASSESSMENT
Patient is a 68 year old male with metastatic Left urothelial cancer, R RCC, and papillary thyroid cancer. He follows at [facility] [place] [place], is currently on pembrolizumab, last dose 08/23. He has hx of L hydronephrosis and has a L ureteral stent in place,  Pt had R sided kidney stones and last month underwent R ureteroscopy and stone removal with Dr. Hernandez at [facility] in August.  Pt developed acute onset of L flank pain this AM and went to [facility] where he had a CT scan which revealed that his L stent had migrated proximally and was causing worsening L hydronephrosis.    # L hydrnephrosis  stent moved  urology eval appreciated  plan for OR, cysto/uteroscopy  per urology   patient is medically optimized for urgent procedure  Ua and urine Cx  cont Cipro   Monitor leukocytosis  Replete electrolytes       # HTN   resume BP meds  will adjust as tolerated     # Hypothyroidism  Synthroid  TFTs     # hLD lipid panel   statin     # Metastatic Left urothelial cancer  Urology follow up   Onc follow up with [facility]     DVT and gI PPX 
This is a 68 year old male with metastatic urothelial carcinoma who presents with left flank pain secondary to migrated ureteral stent.     1. Metastatic urothelial carcinoma   -- Currently on treatment with pembrolizumab, last dose 08/23   -- No systemic treatment while admitted   -- Follow up with Dr. Lazcano at [facility] [place]     2. L flank pain   -- CT done at [facility] shows migrated L ureteral stent causing worsening hydronephrosis   -- Pt on urology service, planned for OR today for cystoscopy/ureteroscopy w L stent exchange/removal   -- Cont empiric antibiotics, f/u cultures   -- Cont pain control. Would avoid NSAIDs.     Will continue to follow.    Zohra Castillo PA-C  Hematology/Oncology  [facility]  [phone] (office)  [phone] (alt office)  Evenings and weekends please call MD on call or office

## 2023-09-07 NOTE — PROGRESS NOTE ADULT - PROVIDER SPECIALTY LIST ADULT
Urology Duration Of Freeze Thaw-Cycle (Seconds): 5 Post-Care Instructions: I reviewed with the patient in detail post-care instructions. Patient is to wear sunprotection, and avoid picking at any of the treated lesions. Pt may apply Vaseline to crusted or scabbing areas. Detail Level: Detailed Consent: The patient's consent was obtained including but not limited to risks of crusting, scabbing, blistering, scarring, darker or lighter pigmentary change, recurrence, incomplete removal and infection. Render Post-Care Instructions In Note?: no Number Of Freeze-Thaw Cycles: 1 freeze-thaw cycle

## 2023-09-07 NOTE — DISCHARGE NOTE PROVIDER - CARE PROVIDER_API CALL
Nicholas Hernandez  Urology  [address] [place] [address], [address] [address]  Phone: [phone]  Fax: [phone]  Follow Up Time:     MAR REYES  [facility] [place] [address]  Phone: [phone]  Follow Up Time:

## 2023-09-07 NOTE — PROGRESS NOTE ADULT - ASSESSMENT
68 y male parkinsons, admitted 9/6/2023 with displaced left ureteral stent for OR 9/7    Urology Preop Note     Diagnosis: displaced left ureteral stent  Procedure: left ureteral stent exchange  Surgeon: ELLYN Hernandez MD    T(C): 37.3 (09-07-23 @ 05:25), Max: 37.3 (09-07-23 @ 05:25)  HR: 77 (09-07-23 @ 05:25) (65 - 88)  BP: 116/73 (09-07-23 @ 05:25) (105/61 - 130/55)  RR: 18 (09-07-23 @ 05:25) (16 - 18)  SpO2: 95% (09-07-23 @ 05:25) (95% - 98%)  Wt(kg): --        Ucx: pending    Awaiting medical optimization    Consent to be signed    - NPO   - IV fluids  - continue cipro  - PT consult  - magnesium repleted

## 2023-09-07 NOTE — PRE-OP CHECKLIST - INTERNAL PROSTHESES
left chest wall port accessed, hx deep brain stiumulator and stimulator battery implanted right chest, right hip sx ? ceramic/yes(specify)

## 2023-09-11 DIAGNOSIS — N30.00 ACUTE CYSTITIS W/OUT HEMATURIA: ICD-10-CM

## 2023-09-11 RX ORDER — LEVOFLOXACIN 250 MG/1
250 TABLET, FILM COATED ORAL DAILY
Qty: 5 | Refills: 0 | Status: ACTIVE | COMMUNITY
Start: 2023-09-11 | End: 1900-01-01

## 2023-10-01 PROBLEM — G20.A1 PARKINSON'S DISEASE DEMENTIA: Status: RESOLVED | Noted: 2023-04-19 | Resolved: 2023-10-01

## 2023-10-09 NOTE — BRIEF OPERATIVE NOTE - SPECIMENS
Is This A New Presentation, Or A Follow-Up?: Skin Lesion
Have Your Skin Lesions Been Treated?: not been treated
none

## 2023-11-22 ENCOUNTER — TRANSCRIPTION ENCOUNTER (OUTPATIENT)
Age: 68
End: 2023-11-22

## 2023-11-22 ENCOUNTER — EMERGENCY (EMERGENCY)
Facility: HOSPITAL | Age: 68
LOS: 1 days | Discharge: HOME | End: 2023-11-22
Attending: STUDENT IN AN ORGANIZED HEALTH CARE EDUCATION/TRAINING PROGRAM | Admitting: INTERNAL MEDICINE
Payer: MEDICARE

## 2023-11-22 VITALS
TEMPERATURE: 99 F | SYSTOLIC BLOOD PRESSURE: 133 MMHG | RESPIRATION RATE: 18 BRPM | HEART RATE: 99 BPM | OXYGEN SATURATION: 91 % | DIASTOLIC BLOOD PRESSURE: 75 MMHG

## 2023-11-22 VITALS
TEMPERATURE: 100 F | DIASTOLIC BLOOD PRESSURE: 68 MMHG | OXYGEN SATURATION: 96 % | SYSTOLIC BLOOD PRESSURE: 121 MMHG | RESPIRATION RATE: 20 BRPM | HEART RATE: 73 BPM

## 2023-11-22 DIAGNOSIS — Z98.890 OTHER SPECIFIED POSTPROCEDURAL STATES: Chronic | ICD-10-CM

## 2023-11-22 DIAGNOSIS — Z96.89 PRESENCE OF OTHER SPECIFIED FUNCTIONAL IMPLANTS: Chronic | ICD-10-CM

## 2023-11-22 DIAGNOSIS — Z96.649 PRESENCE OF UNSPECIFIED ARTIFICIAL HIP JOINT: Chronic | ICD-10-CM

## 2023-11-22 DIAGNOSIS — Z90.49 ACQUIRED ABSENCE OF OTHER SPECIFIED PARTS OF DIGESTIVE TRACT: Chronic | ICD-10-CM

## 2023-11-22 DIAGNOSIS — E87.6 HYPOKALEMIA: ICD-10-CM

## 2023-11-22 LAB
ALBUMIN SERPL ELPH-MCNC: 3.1 G/DL — LOW (ref 3.3–5)
ALBUMIN SERPL ELPH-MCNC: 3.1 G/DL — LOW (ref 3.3–5)
ALBUMIN SERPL ELPH-MCNC: 3.4 G/DL — SIGNIFICANT CHANGE UP (ref 3.3–5)
ALBUMIN SERPL ELPH-MCNC: 3.4 G/DL — SIGNIFICANT CHANGE UP (ref 3.3–5)
ALP SERPL-CCNC: 82 U/L — SIGNIFICANT CHANGE UP (ref 40–120)
ALP SERPL-CCNC: 82 U/L — SIGNIFICANT CHANGE UP (ref 40–120)
ALP SERPL-CCNC: 89 U/L — SIGNIFICANT CHANGE UP (ref 40–120)
ALP SERPL-CCNC: 89 U/L — SIGNIFICANT CHANGE UP (ref 40–120)
ALT FLD-CCNC: 8 U/L — SIGNIFICANT CHANGE UP (ref 4–41)
ALT FLD-CCNC: 8 U/L — SIGNIFICANT CHANGE UP (ref 4–41)
ALT FLD-CCNC: <5 U/L — LOW (ref 4–41)
ALT FLD-CCNC: <5 U/L — LOW (ref 4–41)
ANION GAP SERPL CALC-SCNC: 11 MMOL/L — SIGNIFICANT CHANGE UP (ref 7–14)
ANISOCYTOSIS BLD QL: SLIGHT — SIGNIFICANT CHANGE UP
ANISOCYTOSIS BLD QL: SLIGHT — SIGNIFICANT CHANGE UP
APPEARANCE UR: ABNORMAL
APPEARANCE UR: ABNORMAL
AST SERPL-CCNC: 18 U/L — SIGNIFICANT CHANGE UP (ref 4–40)
AST SERPL-CCNC: 18 U/L — SIGNIFICANT CHANGE UP (ref 4–40)
AST SERPL-CCNC: 22 U/L — SIGNIFICANT CHANGE UP (ref 4–40)
AST SERPL-CCNC: 22 U/L — SIGNIFICANT CHANGE UP (ref 4–40)
BACTERIA # UR AUTO: NEGATIVE /HPF — SIGNIFICANT CHANGE UP
BACTERIA # UR AUTO: NEGATIVE /HPF — SIGNIFICANT CHANGE UP
BASE EXCESS BLDV CALC-SCNC: -0.2 MMOL/L — SIGNIFICANT CHANGE UP (ref -2–3)
BASE EXCESS BLDV CALC-SCNC: -0.2 MMOL/L — SIGNIFICANT CHANGE UP (ref -2–3)
BASOPHILS # BLD AUTO: 0 K/UL — SIGNIFICANT CHANGE UP (ref 0–0.2)
BASOPHILS # BLD AUTO: 0 K/UL — SIGNIFICANT CHANGE UP (ref 0–0.2)
BASOPHILS NFR BLD AUTO: 0 % — SIGNIFICANT CHANGE UP (ref 0–2)
BASOPHILS NFR BLD AUTO: 0 % — SIGNIFICANT CHANGE UP (ref 0–2)
BILIRUB SERPL-MCNC: 0.5 MG/DL — SIGNIFICANT CHANGE UP (ref 0.2–1.2)
BILIRUB SERPL-MCNC: 0.5 MG/DL — SIGNIFICANT CHANGE UP (ref 0.2–1.2)
BILIRUB SERPL-MCNC: 0.7 MG/DL — SIGNIFICANT CHANGE UP (ref 0.2–1.2)
BILIRUB SERPL-MCNC: 0.7 MG/DL — SIGNIFICANT CHANGE UP (ref 0.2–1.2)
BILIRUB UR-MCNC: NEGATIVE — SIGNIFICANT CHANGE UP
BILIRUB UR-MCNC: NEGATIVE — SIGNIFICANT CHANGE UP
BLOOD GAS VENOUS COMPREHENSIVE RESULT: SIGNIFICANT CHANGE UP
BLOOD GAS VENOUS COMPREHENSIVE RESULT: SIGNIFICANT CHANGE UP
BUN SERPL-MCNC: 20 MG/DL — SIGNIFICANT CHANGE UP (ref 7–23)
BUN SERPL-MCNC: 20 MG/DL — SIGNIFICANT CHANGE UP (ref 7–23)
BUN SERPL-MCNC: 21 MG/DL — SIGNIFICANT CHANGE UP (ref 7–23)
BUN SERPL-MCNC: 21 MG/DL — SIGNIFICANT CHANGE UP (ref 7–23)
CALCIUM SERPL-MCNC: 8.5 MG/DL — SIGNIFICANT CHANGE UP (ref 8.4–10.5)
CALCIUM SERPL-MCNC: 8.5 MG/DL — SIGNIFICANT CHANGE UP (ref 8.4–10.5)
CALCIUM SERPL-MCNC: 8.6 MG/DL — SIGNIFICANT CHANGE UP (ref 8.4–10.5)
CALCIUM SERPL-MCNC: 8.6 MG/DL — SIGNIFICANT CHANGE UP (ref 8.4–10.5)
CAST: 8 /LPF — HIGH (ref 0–4)
CAST: 8 /LPF — HIGH (ref 0–4)
CHLORIDE BLDV-SCNC: 98 MMOL/L — SIGNIFICANT CHANGE UP (ref 96–108)
CHLORIDE BLDV-SCNC: 98 MMOL/L — SIGNIFICANT CHANGE UP (ref 96–108)
CHLORIDE SERPL-SCNC: 100 MMOL/L — SIGNIFICANT CHANGE UP (ref 98–107)
CHLORIDE SERPL-SCNC: 100 MMOL/L — SIGNIFICANT CHANGE UP (ref 98–107)
CHLORIDE SERPL-SCNC: 97 MMOL/L — LOW (ref 98–107)
CHLORIDE SERPL-SCNC: 97 MMOL/L — LOW (ref 98–107)
CO2 BLDV-SCNC: 24.6 MMOL/L — SIGNIFICANT CHANGE UP (ref 22–26)
CO2 BLDV-SCNC: 24.6 MMOL/L — SIGNIFICANT CHANGE UP (ref 22–26)
CO2 SERPL-SCNC: 22 MMOL/L — SIGNIFICANT CHANGE UP (ref 22–31)
CO2 SERPL-SCNC: 22 MMOL/L — SIGNIFICANT CHANGE UP (ref 22–31)
CO2 SERPL-SCNC: 23 MMOL/L — SIGNIFICANT CHANGE UP (ref 22–31)
CO2 SERPL-SCNC: 23 MMOL/L — SIGNIFICANT CHANGE UP (ref 22–31)
COLOR SPEC: ABNORMAL
COLOR SPEC: ABNORMAL
CREAT SERPL-MCNC: 1.22 MG/DL — SIGNIFICANT CHANGE UP (ref 0.5–1.3)
CREAT SERPL-MCNC: 1.22 MG/DL — SIGNIFICANT CHANGE UP (ref 0.5–1.3)
CREAT SERPL-MCNC: 1.35 MG/DL — HIGH (ref 0.5–1.3)
CREAT SERPL-MCNC: 1.35 MG/DL — HIGH (ref 0.5–1.3)
DIFF PNL FLD: ABNORMAL
DIFF PNL FLD: ABNORMAL
EGFR: 58 ML/MIN/1.73M2 — LOW
EGFR: 58 ML/MIN/1.73M2 — LOW
EGFR: 65 ML/MIN/1.73M2 — SIGNIFICANT CHANGE UP
EGFR: 65 ML/MIN/1.73M2 — SIGNIFICANT CHANGE UP
ELLIPTOCYTES BLD QL SMEAR: SLIGHT — SIGNIFICANT CHANGE UP
ELLIPTOCYTES BLD QL SMEAR: SLIGHT — SIGNIFICANT CHANGE UP
EOSINOPHIL # BLD AUTO: 0 K/UL — SIGNIFICANT CHANGE UP (ref 0–0.5)
EOSINOPHIL # BLD AUTO: 0 K/UL — SIGNIFICANT CHANGE UP (ref 0–0.5)
EOSINOPHIL NFR BLD AUTO: 0 % — SIGNIFICANT CHANGE UP (ref 0–6)
EOSINOPHIL NFR BLD AUTO: 0 % — SIGNIFICANT CHANGE UP (ref 0–6)
FLUAV AG NPH QL: SIGNIFICANT CHANGE UP
FLUAV AG NPH QL: SIGNIFICANT CHANGE UP
FLUBV AG NPH QL: SIGNIFICANT CHANGE UP
FLUBV AG NPH QL: SIGNIFICANT CHANGE UP
GAS PNL BLDV: 130 MMOL/L — LOW (ref 136–145)
GAS PNL BLDV: 130 MMOL/L — LOW (ref 136–145)
GLUCOSE BLDV-MCNC: 166 MG/DL — HIGH (ref 70–99)
GLUCOSE BLDV-MCNC: 166 MG/DL — HIGH (ref 70–99)
GLUCOSE SERPL-MCNC: 111 MG/DL — HIGH (ref 70–99)
GLUCOSE SERPL-MCNC: 111 MG/DL — HIGH (ref 70–99)
GLUCOSE SERPL-MCNC: 155 MG/DL — HIGH (ref 70–99)
GLUCOSE SERPL-MCNC: 155 MG/DL — HIGH (ref 70–99)
GLUCOSE UR QL: NEGATIVE MG/DL — SIGNIFICANT CHANGE UP
GLUCOSE UR QL: NEGATIVE MG/DL — SIGNIFICANT CHANGE UP
HCO3 BLDV-SCNC: 24 MMOL/L — SIGNIFICANT CHANGE UP (ref 22–29)
HCO3 BLDV-SCNC: 24 MMOL/L — SIGNIFICANT CHANGE UP (ref 22–29)
HCT VFR BLD CALC: 24.4 % — LOW (ref 39–50)
HCT VFR BLD CALC: 24.4 % — LOW (ref 39–50)
HCT VFR BLD CALC: 24.7 % — LOW (ref 39–50)
HCT VFR BLD CALC: 24.7 % — LOW (ref 39–50)
HCT VFR BLDA CALC: 24 % — LOW (ref 39–51)
HCT VFR BLDA CALC: 24 % — LOW (ref 39–51)
HGB BLD CALC-MCNC: 7.9 G/DL — LOW (ref 12.6–17.4)
HGB BLD CALC-MCNC: 7.9 G/DL — LOW (ref 12.6–17.4)
HGB BLD-MCNC: 7.4 G/DL — LOW (ref 13–17)
HGB BLD-MCNC: 7.4 G/DL — LOW (ref 13–17)
HGB BLD-MCNC: 7.6 G/DL — LOW (ref 13–17)
HGB BLD-MCNC: 7.6 G/DL — LOW (ref 13–17)
HYALINE CASTS # UR AUTO: PRESENT
HYALINE CASTS # UR AUTO: PRESENT
HYPOCHROMIA BLD QL: SIGNIFICANT CHANGE UP
HYPOCHROMIA BLD QL: SIGNIFICANT CHANGE UP
IANC: 14.9 K/UL — HIGH (ref 1.8–7.4)
IANC: 14.9 K/UL — HIGH (ref 1.8–7.4)
KETONES UR-MCNC: ABNORMAL MG/DL
KETONES UR-MCNC: ABNORMAL MG/DL
LACTATE BLDV-MCNC: 1.5 MMOL/L — SIGNIFICANT CHANGE UP (ref 0.5–2)
LACTATE BLDV-MCNC: 1.5 MMOL/L — SIGNIFICANT CHANGE UP (ref 0.5–2)
LEUKOCYTE ESTERASE UR-ACNC: ABNORMAL
LEUKOCYTE ESTERASE UR-ACNC: ABNORMAL
LYMPHOCYTES # BLD AUTO: 0.42 K/UL — LOW (ref 1–3.3)
LYMPHOCYTES # BLD AUTO: 0.42 K/UL — LOW (ref 1–3.3)
LYMPHOCYTES # BLD AUTO: 2.6 % — LOW (ref 13–44)
LYMPHOCYTES # BLD AUTO: 2.6 % — LOW (ref 13–44)
MAGNESIUM SERPL-MCNC: 1.5 MG/DL — LOW (ref 1.6–2.6)
MAGNESIUM SERPL-MCNC: 1.5 MG/DL — LOW (ref 1.6–2.6)
MCHC RBC-ENTMCNC: 22.4 PG — LOW (ref 27–34)
MCHC RBC-ENTMCNC: 30.3 GM/DL — LOW (ref 32–36)
MCHC RBC-ENTMCNC: 30.3 GM/DL — LOW (ref 32–36)
MCHC RBC-ENTMCNC: 30.8 GM/DL — LOW (ref 32–36)
MCHC RBC-ENTMCNC: 30.8 GM/DL — LOW (ref 32–36)
MCV RBC AUTO: 72.6 FL — LOW (ref 80–100)
MCV RBC AUTO: 72.6 FL — LOW (ref 80–100)
MCV RBC AUTO: 73.9 FL — LOW (ref 80–100)
MCV RBC AUTO: 73.9 FL — LOW (ref 80–100)
MICROCYTES BLD QL: SLIGHT — SIGNIFICANT CHANGE UP
MICROCYTES BLD QL: SLIGHT — SIGNIFICANT CHANGE UP
MONOCYTES # BLD AUTO: 1.14 K/UL — HIGH (ref 0–0.9)
MONOCYTES # BLD AUTO: 1.14 K/UL — HIGH (ref 0–0.9)
MONOCYTES NFR BLD AUTO: 7 % — SIGNIFICANT CHANGE UP (ref 2–14)
MONOCYTES NFR BLD AUTO: 7 % — SIGNIFICANT CHANGE UP (ref 2–14)
MRSA PCR RESULT.: SIGNIFICANT CHANGE UP
MRSA PCR RESULT.: SIGNIFICANT CHANGE UP
NEUTROPHILS # BLD AUTO: 14.69 K/UL — HIGH (ref 1.8–7.4)
NEUTROPHILS # BLD AUTO: 14.69 K/UL — HIGH (ref 1.8–7.4)
NEUTROPHILS NFR BLD AUTO: 90.4 % — HIGH (ref 43–77)
NEUTROPHILS NFR BLD AUTO: 90.4 % — HIGH (ref 43–77)
NITRITE UR-MCNC: NEGATIVE — SIGNIFICANT CHANGE UP
NITRITE UR-MCNC: NEGATIVE — SIGNIFICANT CHANGE UP
NRBC # BLD: 0 /100 WBCS — SIGNIFICANT CHANGE UP (ref 0–0)
NRBC # BLD: 0 /100 WBCS — SIGNIFICANT CHANGE UP (ref 0–0)
NRBC # FLD: 0 K/UL — SIGNIFICANT CHANGE UP (ref 0–0)
NRBC # FLD: 0 K/UL — SIGNIFICANT CHANGE UP (ref 0–0)
NT-PROBNP SERPL-SCNC: 524 PG/ML — HIGH
NT-PROBNP SERPL-SCNC: 524 PG/ML — HIGH
OVALOCYTES BLD QL SMEAR: SIGNIFICANT CHANGE UP
OVALOCYTES BLD QL SMEAR: SIGNIFICANT CHANGE UP
PCO2 BLDV: 34 MMHG — LOW (ref 42–55)
PCO2 BLDV: 34 MMHG — LOW (ref 42–55)
PH BLDV: 7.45 — HIGH (ref 7.32–7.43)
PH BLDV: 7.45 — HIGH (ref 7.32–7.43)
PH UR: 6 — SIGNIFICANT CHANGE UP (ref 5–8)
PH UR: 6 — SIGNIFICANT CHANGE UP (ref 5–8)
PHOSPHATE SERPL-MCNC: 1.9 MG/DL — LOW (ref 2.5–4.5)
PHOSPHATE SERPL-MCNC: 1.9 MG/DL — LOW (ref 2.5–4.5)
PLAT MORPH BLD: ABNORMAL
PLAT MORPH BLD: ABNORMAL
PLATELET # BLD AUTO: 219 K/UL — SIGNIFICANT CHANGE UP (ref 150–400)
PLATELET # BLD AUTO: 219 K/UL — SIGNIFICANT CHANGE UP (ref 150–400)
PLATELET # BLD AUTO: 225 K/UL — SIGNIFICANT CHANGE UP (ref 150–400)
PLATELET # BLD AUTO: 225 K/UL — SIGNIFICANT CHANGE UP (ref 150–400)
PLATELET COUNT - ESTIMATE: NORMAL — SIGNIFICANT CHANGE UP
PLATELET COUNT - ESTIMATE: NORMAL — SIGNIFICANT CHANGE UP
PO2 BLDV: 50 MMHG — HIGH (ref 25–45)
PO2 BLDV: 50 MMHG — HIGH (ref 25–45)
POIKILOCYTOSIS BLD QL AUTO: SLIGHT — SIGNIFICANT CHANGE UP
POIKILOCYTOSIS BLD QL AUTO: SLIGHT — SIGNIFICANT CHANGE UP
POTASSIUM BLDV-SCNC: 2.9 MMOL/L — CRITICAL LOW (ref 3.5–5.1)
POTASSIUM BLDV-SCNC: 2.9 MMOL/L — CRITICAL LOW (ref 3.5–5.1)
POTASSIUM SERPL-MCNC: 2.9 MMOL/L — CRITICAL LOW (ref 3.5–5.3)
POTASSIUM SERPL-MCNC: 2.9 MMOL/L — CRITICAL LOW (ref 3.5–5.3)
POTASSIUM SERPL-MCNC: 3.7 MMOL/L — SIGNIFICANT CHANGE UP (ref 3.5–5.3)
POTASSIUM SERPL-MCNC: 3.7 MMOL/L — SIGNIFICANT CHANGE UP (ref 3.5–5.3)
POTASSIUM SERPL-SCNC: 2.9 MMOL/L — CRITICAL LOW (ref 3.5–5.3)
POTASSIUM SERPL-SCNC: 2.9 MMOL/L — CRITICAL LOW (ref 3.5–5.3)
POTASSIUM SERPL-SCNC: 3.7 MMOL/L — SIGNIFICANT CHANGE UP (ref 3.5–5.3)
POTASSIUM SERPL-SCNC: 3.7 MMOL/L — SIGNIFICANT CHANGE UP (ref 3.5–5.3)
PROT SERPL-MCNC: 7.2 G/DL — SIGNIFICANT CHANGE UP (ref 6–8.3)
PROT SERPL-MCNC: 7.2 G/DL — SIGNIFICANT CHANGE UP (ref 6–8.3)
PROT SERPL-MCNC: 7.5 G/DL — SIGNIFICANT CHANGE UP (ref 6–8.3)
PROT SERPL-MCNC: 7.5 G/DL — SIGNIFICANT CHANGE UP (ref 6–8.3)
PROT UR-MCNC: 100 MG/DL
PROT UR-MCNC: 100 MG/DL
RBC # BLD: 3.3 M/UL — LOW (ref 4.2–5.8)
RBC # BLD: 3.3 M/UL — LOW (ref 4.2–5.8)
RBC # BLD: 3.4 M/UL — LOW (ref 4.2–5.8)
RBC # BLD: 3.4 M/UL — LOW (ref 4.2–5.8)
RBC # FLD: 17.3 % — HIGH (ref 10.3–14.5)
RBC BLD AUTO: ABNORMAL
RBC BLD AUTO: ABNORMAL
RBC CASTS # UR COMP ASSIST: 2357 /HPF — HIGH (ref 0–4)
RBC CASTS # UR COMP ASSIST: 2357 /HPF — HIGH (ref 0–4)
REVIEW: SIGNIFICANT CHANGE UP
REVIEW: SIGNIFICANT CHANGE UP
RSV RNA NPH QL NAA+NON-PROBE: SIGNIFICANT CHANGE UP
RSV RNA NPH QL NAA+NON-PROBE: SIGNIFICANT CHANGE UP
S AUREUS DNA NOSE QL NAA+PROBE: DETECTED
S AUREUS DNA NOSE QL NAA+PROBE: DETECTED
SAO2 % BLDV: 73.3 % — SIGNIFICANT CHANGE UP (ref 67–88)
SAO2 % BLDV: 73.3 % — SIGNIFICANT CHANGE UP (ref 67–88)
SARS-COV-2 RNA SPEC QL NAA+PROBE: SIGNIFICANT CHANGE UP
SARS-COV-2 RNA SPEC QL NAA+PROBE: SIGNIFICANT CHANGE UP
SICKLE CELLS BLD QL SMEAR: SLIGHT — SIGNIFICANT CHANGE UP
SICKLE CELLS BLD QL SMEAR: SLIGHT — SIGNIFICANT CHANGE UP
SODIUM SERPL-SCNC: 130 MMOL/L — LOW (ref 135–145)
SODIUM SERPL-SCNC: 130 MMOL/L — LOW (ref 135–145)
SODIUM SERPL-SCNC: 134 MMOL/L — LOW (ref 135–145)
SODIUM SERPL-SCNC: 134 MMOL/L — LOW (ref 135–145)
SP GR SPEC: 1.02 — SIGNIFICANT CHANGE UP (ref 1–1.03)
SP GR SPEC: 1.02 — SIGNIFICANT CHANGE UP (ref 1–1.03)
SQUAMOUS # UR AUTO: 1 /HPF — SIGNIFICANT CHANGE UP (ref 0–5)
SQUAMOUS # UR AUTO: 1 /HPF — SIGNIFICANT CHANGE UP (ref 0–5)
T3 SERPL-MCNC: 73 NG/DL — LOW (ref 80–200)
T3 SERPL-MCNC: 73 NG/DL — LOW (ref 80–200)
T4 AB SER-ACNC: 4.91 UG/DL — LOW (ref 5.1–13)
T4 AB SER-ACNC: 4.91 UG/DL — LOW (ref 5.1–13)
TROPONIN T, HIGH SENSITIVITY RESULT: 104 NG/L — CRITICAL HIGH
TROPONIN T, HIGH SENSITIVITY RESULT: 104 NG/L — CRITICAL HIGH
TROPONIN T, HIGH SENSITIVITY RESULT: 99 NG/L — CRITICAL HIGH
TROPONIN T, HIGH SENSITIVITY RESULT: 99 NG/L — CRITICAL HIGH
TSH SERPL-MCNC: 20.96 UIU/ML — HIGH (ref 0.27–4.2)
TSH SERPL-MCNC: 20.96 UIU/ML — HIGH (ref 0.27–4.2)
UROBILINOGEN FLD QL: 1 MG/DL — SIGNIFICANT CHANGE UP (ref 0.2–1)
UROBILINOGEN FLD QL: 1 MG/DL — SIGNIFICANT CHANGE UP (ref 0.2–1)
WBC # BLD: 12.1 K/UL — HIGH (ref 3.8–10.5)
WBC # BLD: 12.1 K/UL — HIGH (ref 3.8–10.5)
WBC # BLD: 16.25 K/UL — HIGH (ref 3.8–10.5)
WBC # BLD: 16.25 K/UL — HIGH (ref 3.8–10.5)
WBC # FLD AUTO: 12.1 K/UL — HIGH (ref 3.8–10.5)
WBC # FLD AUTO: 12.1 K/UL — HIGH (ref 3.8–10.5)
WBC # FLD AUTO: 16.25 K/UL — HIGH (ref 3.8–10.5)
WBC # FLD AUTO: 16.25 K/UL — HIGH (ref 3.8–10.5)
WBC UR QL: 66 /HPF — HIGH (ref 0–5)
WBC UR QL: 66 /HPF — HIGH (ref 0–5)

## 2023-11-22 PROCEDURE — 93010 ELECTROCARDIOGRAM REPORT: CPT

## 2023-11-22 RX ORDER — CEFTRIAXONE 500 MG/1
1000 INJECTION, POWDER, FOR SOLUTION INTRAMUSCULAR; INTRAVENOUS EVERY 24 HOURS
Refills: 0 | Status: DISCONTINUED | OUTPATIENT
Start: 2023-11-22 | End: 2023-11-22

## 2023-11-22 RX ORDER — POTASSIUM CHLORIDE 20 MEQ
40 PACKET (EA) ORAL ONCE
Refills: 0 | Status: COMPLETED | OUTPATIENT
Start: 2023-11-22 | End: 2023-11-22

## 2023-11-22 RX ORDER — SENNA PLUS 8.6 MG/1
2 TABLET ORAL AT BEDTIME
Refills: 0 | Status: DISCONTINUED | OUTPATIENT
Start: 2023-11-22 | End: 2023-11-22

## 2023-11-22 RX ORDER — POTASSIUM PHOSPHATE, MONOBASIC POTASSIUM PHOSPHATE, DIBASIC 236; 224 MG/ML; MG/ML
15 INJECTION, SOLUTION INTRAVENOUS ONCE
Refills: 0 | Status: COMPLETED | OUTPATIENT
Start: 2023-11-22 | End: 2023-11-22

## 2023-11-22 RX ORDER — SODIUM CHLORIDE 9 MG/ML
500 INJECTION INTRAMUSCULAR; INTRAVENOUS; SUBCUTANEOUS ONCE
Refills: 0 | Status: COMPLETED | OUTPATIENT
Start: 2023-11-22 | End: 2023-11-22

## 2023-11-22 RX ORDER — CARBIDOPA AND LEVODOPA 25; 100 MG/1; MG/1
0.5 TABLET ORAL
Refills: 0 | Status: DISCONTINUED | OUTPATIENT
Start: 2023-11-22 | End: 2023-11-22

## 2023-11-22 RX ORDER — LEVOTHYROXINE SODIUM 125 MCG
100 TABLET ORAL ONCE
Refills: 0 | Status: COMPLETED | OUTPATIENT
Start: 2023-11-22 | End: 2023-11-22

## 2023-11-22 RX ORDER — AMLODIPINE BESYLATE 2.5 MG/1
10 TABLET ORAL DAILY
Refills: 0 | Status: DISCONTINUED | OUTPATIENT
Start: 2023-11-22 | End: 2023-11-22

## 2023-11-22 RX ORDER — LEVOFLOXACIN 5 MG/ML
1 INJECTION, SOLUTION INTRAVENOUS
Qty: 7 | Refills: 0
Start: 2023-11-22 | End: 2023-11-28

## 2023-11-22 RX ORDER — LOSARTAN POTASSIUM 100 MG/1
100 TABLET, FILM COATED ORAL DAILY
Refills: 0 | Status: DISCONTINUED | OUTPATIENT
Start: 2023-11-22 | End: 2023-11-22

## 2023-11-22 RX ORDER — CEFTRIAXONE 500 MG/1
1000 INJECTION, POWDER, FOR SOLUTION INTRAMUSCULAR; INTRAVENOUS ONCE
Refills: 0 | Status: COMPLETED | OUTPATIENT
Start: 2023-11-22 | End: 2023-11-22

## 2023-11-22 RX ORDER — LEVOTHYROXINE SODIUM 125 MCG
1 TABLET ORAL
Qty: 30 | Refills: 0
Start: 2023-11-22 | End: 2023-12-21

## 2023-11-22 RX ORDER — LEVOTHYROXINE SODIUM 125 MCG
1 TABLET ORAL
Refills: 0 | DISCHARGE

## 2023-11-22 RX ORDER — MAGNESIUM SULFATE 500 MG/ML
2 VIAL (ML) INJECTION ONCE
Refills: 0 | Status: COMPLETED | OUTPATIENT
Start: 2023-11-22 | End: 2023-11-22

## 2023-11-22 RX ORDER — METOPROLOL TARTRATE 50 MG
50 TABLET ORAL DAILY
Refills: 0 | Status: DISCONTINUED | OUTPATIENT
Start: 2023-11-22 | End: 2023-11-22

## 2023-11-22 RX ORDER — ASPIRIN/CALCIUM CARB/MAGNESIUM 324 MG
81 TABLET ORAL DAILY
Refills: 0 | Status: DISCONTINUED | OUTPATIENT
Start: 2023-11-22 | End: 2023-11-22

## 2023-11-22 RX ORDER — ATORVASTATIN CALCIUM 80 MG/1
20 TABLET, FILM COATED ORAL AT BEDTIME
Refills: 0 | Status: DISCONTINUED | OUTPATIENT
Start: 2023-11-22 | End: 2023-11-22

## 2023-11-22 RX ORDER — TAMSULOSIN HYDROCHLORIDE 0.4 MG/1
0.8 CAPSULE ORAL AT BEDTIME
Refills: 0 | Status: DISCONTINUED | OUTPATIENT
Start: 2023-11-22 | End: 2023-11-22

## 2023-11-22 RX ORDER — LEVOTHYROXINE SODIUM 125 MCG
100 TABLET ORAL DAILY
Refills: 0 | Status: DISCONTINUED | OUTPATIENT
Start: 2023-11-22 | End: 2023-11-22

## 2023-11-22 RX ORDER — CARBIDOPA AND LEVODOPA 25; 100 MG/1; MG/1
1 TABLET ORAL ONCE
Refills: 0 | Status: DISCONTINUED | OUTPATIENT
Start: 2023-11-22 | End: 2023-11-22

## 2023-11-22 RX ORDER — CARBIDOPA AND LEVODOPA 25; 100 MG/1; MG/1
1 TABLET ORAL ONCE
Refills: 0 | Status: COMPLETED | OUTPATIENT
Start: 2023-11-22 | End: 2023-11-22

## 2023-11-22 RX ORDER — LEVOTHYROXINE SODIUM 125 MCG
75 TABLET ORAL DAILY
Refills: 0 | Status: DISCONTINUED | OUTPATIENT
Start: 2023-11-22 | End: 2023-11-22

## 2023-11-22 RX ADMIN — Medication 300 UNIT(S): at 18:54

## 2023-11-22 RX ADMIN — AMLODIPINE BESYLATE 10 MILLIGRAM(S): 2.5 TABLET ORAL at 13:09

## 2023-11-22 RX ADMIN — CARBIDOPA AND LEVODOPA 0.5 TABLET(S): 25; 100 TABLET ORAL at 18:53

## 2023-11-22 RX ADMIN — Medication 100 MICROGRAM(S): at 07:46

## 2023-11-22 RX ADMIN — LOSARTAN POTASSIUM 100 MILLIGRAM(S): 100 TABLET, FILM COATED ORAL at 13:10

## 2023-11-22 RX ADMIN — Medication 25 GRAM(S): at 07:47

## 2023-11-22 RX ADMIN — SODIUM CHLORIDE 500 MILLILITER(S): 9 INJECTION INTRAMUSCULAR; INTRAVENOUS; SUBCUTANEOUS at 07:07

## 2023-11-22 RX ADMIN — CARBIDOPA AND LEVODOPA 1 TABLET(S): 25; 100 TABLET ORAL at 06:24

## 2023-11-22 RX ADMIN — Medication 81 MILLIGRAM(S): at 13:09

## 2023-11-22 RX ADMIN — POTASSIUM PHOSPHATE, MONOBASIC POTASSIUM PHOSPHATE, DIBASIC 62.5 MILLIMOLE(S): 236; 224 INJECTION, SOLUTION INTRAVENOUS at 07:48

## 2023-11-22 RX ADMIN — Medication 40 MILLIEQUIVALENT(S): at 06:24

## 2023-11-22 RX ADMIN — CARBIDOPA AND LEVODOPA 0.5 TABLET(S): 25; 100 TABLET ORAL at 13:10

## 2023-11-22 RX ADMIN — Medication 50 MILLIGRAM(S): at 13:10

## 2023-11-22 RX ADMIN — CEFTRIAXONE 100 MILLIGRAM(S): 500 INJECTION, POWDER, FOR SOLUTION INTRAMUSCULAR; INTRAVENOUS at 06:39

## 2023-11-22 NOTE — PHYSICAL THERAPY INITIAL EVALUATION ADULT - MANUAL MUSCLE TESTING RESULTS, REHAB EVAL
Bilateral shoulders 3+/5 (bilateral rotator cuff tears), bilateral elbows/hands/wrist 4-/5, Right lower extremity 4-/5, left lower extremity 3+/5

## 2023-11-22 NOTE — PHYSICAL THERAPY INITIAL EVALUATION ADULT - ADDITIONAL COMMENTS
Pt reports that he lives in a private house with his wife with 1 step to enter; and a flight of stairs to negotiate to bedroom; (+)1 handrail; however in 3 weeks they will be moving into a condo with no steps to negotiate. Prior to hospital admission, pt was completely independent and used a rollator with ambulation. Pt had 1 fall right before coming to the hospital where he stood and his knees gave out. He was getting home PT 2x/week.    Pt left comfortable in bed, NAD, all lines intact, all precautions maintained, with call bell in reach, wife @ bedside, and RN aware of PT evaluation.

## 2023-11-22 NOTE — PHYSICAL THERAPY INITIAL EVALUATION ADULT - PATIENT PROFILE REVIEW, REHAB EVAL
No Formal Activity Order in the Computer; Spoke with PILAR Cardona prior to PT evaluation--> Pt OK for PT consult/OOB activity; vitals taken; /68mmHg/yes

## 2023-11-22 NOTE — ED PROVIDER NOTE - ATTENDING CONTRIBUTION TO CARE
Dr. Han, Attending Physician-  I performed a face to face bedside interview with patient regarding history of present illness, review of symptoms and past medical history. I completed an independent physical exam.  I have discussed patient's plan of care with the resident.    68M, parkinsons, hypothyroid, urothelial CA on chemo, recent partial thyroidectomy, who presents with inability to ambulate. For the past one week, reports that he has had intermittent episodes of his legs "locking up." Has been stable on his sinemet. This morning, he slipped out of bed because of another episode and fell onto his knees b/l. Wife at bedside notes that he has not been eating very much since the surgery. No blood thinners. Physical: afebrile, resting tremor, intermittent hypoxia, abdomen soft, b/l patellar abrasions, unable to gait unassisted. Plan: labs, CTPE, admit for gait instability. ?worsening parkinsons vs. electrolyte disturbance.

## 2023-11-22 NOTE — DISCHARGE NOTE PROVIDER - CARE PROVIDERS DIRECT ADDRESSES
[email or web address],[email or web address],DirectAddress_Unknown

## 2023-11-22 NOTE — PHYSICAL THERAPY INITIAL EVALUATION ADULT - PERTINENT HX OF CURRENT PROBLEM, REHAB EVAL
Patient is a 69 y/o Male with PMhx of Parkinson Disease, hypothyroidism, urothelial CA on chemotherapy (last treatment a week ago), recent partial thyroidectomy, presenting with days onset of bilateral LE weakness, unable to ambulate. Patient reports that he has been compliant with his synthroid and PD medications. He has been having bilateral stiff legs and unable to move his legs for the past few days. He slipped out of bed today because of his weakness. Reports bilateral knee pain because of the fall. Denies head injury, LOC. Not on blood thinner. Visible tremors. Denies recent illness, fever, cough, congestion, chest pain, shortness of breath, abdominal pain, diarrhea. X-ray of Bilateral knee (-)fx or dislocation.

## 2023-11-22 NOTE — ED PROVIDER NOTE - NS ED ROS FT
Constitutional:  (-) fever, (-) chills, (-) lethargy  Eyes:  (-) eye pain (-) visual changes  ENMT: (-) nasal discharge, (-) sore throat. (-) neck pain or stiffness  Cardiac: (-) chest pain (-) palpitations  Respiratory:  (-) cough (-) respiratory distress.   GI:  (-) nausea (-) vomiting (-) diarrhea (-) abdominal pain.  :  (-) dysuria (-) frequency (-) burning.  MS:  (-) back pain (-) joint pain  Neuro:  (-) headache (-) numbness (-) tingling (+) b/l leg weakness  Skin:  (-) rash  Except as documented in the HPI,  all other systems are negative

## 2023-11-22 NOTE — ED ADULT NURSE NOTE - OBJECTIVE STATEMENT
Pt arrives to trauma B with wife at bedside. PMHx of left kidney cancer, and parkinsons. Pt has a port on left chest. Pt C/O "legs locking" when trying to get out of bed. Pt states he was unable to walk. Respirations are even and unlabored, NSR on monitor. Abdomen is soft and nondistended. Denies urinary symptoms. Capillary refill is 2 seconds bilaterally. Pending MD evaluation. Pt arrives to trauma B with wife at bedside. Ambulatory at baseline, A&Ox4. PMHx of Parkinson's Disease, hypothyroidism, urothelial cancer on chemotherapy, last treatment a week ago, and recent partial thyroidectomy. Pt has a single port on left chest and is tremulous. Pt states tremors is his baseline. Pt C/O "legs locking" when trying to get out of bed and states he was unable to walk or get out of bed. Pt endorses weakness and lethargy. Pt denies CP, headache, numbness, tingling, and dizziness. Respirations are even and unlabored, NSR on monitor. Abdomen is soft and nondistended. Denies urinary symptoms. Capillary refill is 2 seconds bilaterally. Pending MD evaluation. Pt arrives to trauma B with wife at bedside. Ambulatory at baseline, A&Ox4. PMHx of Parkinson's Disease, hypothyroidism, urothelial cancer on chemotherapy, last treatment a week ago, and recent partial thyroidectomy. Pt has a single port on left chest and is tremulous. Pt states tremors is his baseline. Pt C/O "legs locking" when trying to get out of bed and states he was unable to walk or get out of bed. Pt endorses weakness and lethargy. Extremity strength is equal bilaterally, no arm drift present, no facial droop or slurred speech observed. Pt denies CP, headache, numbness, tingling, and dizziness. Respirations are even and unlabored, NSR on monitor. Abdomen is soft and nondistended. Denies urinary symptoms. Capillary refill is 2 seconds bilaterally. Pending MD evaluation.

## 2023-11-22 NOTE — ED ADULT NURSE NOTE - NSFALLRISKINTERV_ED_ALL_ED

## 2023-11-22 NOTE — ED ADULT NURSE REASSESSMENT NOTE - NS ED NURSE REASSESS COMMENT FT1
Pt lying in stretcher with wife at bedside. Respirations are even and unlabored. NSR on monitor. Port was accessed using a 20G needle. Site is clean dry and intact. Labs drawn and sent. Pending results.

## 2023-11-22 NOTE — DISCHARGE NOTE NURSING/CASE MANAGEMENT/SOCIAL WORK - NSDCPEFALRISK_GEN_ALL_CORE
For information on Fall & Injury Prevention, visit: [email or web address] OR  [email or web address] OR  https://www.cdc.gov/steadi/patient.html

## 2023-11-22 NOTE — PHYSICAL THERAPY INITIAL EVALUATION ADULT - GENERAL OBSERVATIONS, REHAB EVAL
Pt encountered in semisupine position, no distress, AxOx4, with +IV, +tele, +.5L of O2 through nasal cannula, and wife @ bedside. Pt agreeable to participate in PT evaluation.

## 2023-11-22 NOTE — DISCHARGE NOTE PROVIDER - NSDCMRMEDTOKEN_GEN_ALL_CORE_FT
Acidophilus oral tablet: 2 tab(s) orally 2 times a day  amLODIPine 10 mg oral tablet: 1 tab(s) orally once a day  aspirin 81 mg oral tablet: 1 tab(s) orally once a day  atorvastatin 20 mg oral tablet: 1 tab(s) orally once a day  carbidopa-levodopa 25 mg-250 mg oral tablet: 0.5 tab(s) orally 4 times a day  Flomax 0.4 mg oral capsule: 2 cap(s) orally once a day (at bedtime)  levoFLOXacin 500 mg oral tablet: 1 tab(s) orally once a day  levothyroxine 100 mcg (0.1 mg) oral tablet: 1 tab(s) orally once a day  losartan 100 mg oral tablet: 1 tab(s) orally once a day  magnesium oxide 400 mg oral tablet: 1 tab(s) orally 3 times a day  metoprolol succinate 50 mg oral tablet, extended release: 1 tab(s) orally once a day  senna leaf extract oral tablet: 2 tab(s) orally once a day (at bedtime) As needed Constipation

## 2023-11-22 NOTE — DISCHARGE NOTE PROVIDER - PROVIDER TOKENS
PROVIDER:[TOKEN:[[id number]]],FREE:[LAST:[PCP],PHONE:[(   )    -],FAX:[(   )    -]],FREE:[LAST:[Endocrinology],PHONE:[(   )    -],FAX:[(   )    -]]

## 2023-11-22 NOTE — DISCHARGE NOTE PROVIDER - CARE PROVIDER_API CALL
Nicholas Hernandez  Urology  [address] [place] [address], [address] [address]  Phone: [phone]  Fax: [phone]  Follow Up Time:     PCP,   Phone: (   )    -  Fax: (   )    -  Follow Up Time:     Endocrinology,   Phone: (   )    -  Fax: (   )    -  Follow Up Time:

## 2023-11-22 NOTE — ED PROVIDER NOTE - PHYSICAL EXAMINATION
Gen: generalized tremors noted, AAOx3, having difficulty standing and ambulating with assistance   HEENT: NCAT, EOMI, PERRLA, normal conjunctiva, tongue midline, oral mucosa moist  Lung: CTAB, no respiratory distress, diminished lung sound bilaterally, speaking in full sentences, satting 90% on RA   CV: RRR, no murmurs, rubs or gallops, distal pulses 2+ b/l  Abd: soft, NT, ND, no guarding, no rigidity, no rebound tenderness, no CVA tenderness   MSK: no visible deformities, ROM limited of LEs, ROM normal in UE, no back TTP  Neuro: No focal sensory or motor deficits  Skin: Warm, well perfused, minor abrasions noted on bilateral knees, no leg swelling  Psych: normal affect, calm

## 2023-11-22 NOTE — ED ADULT NURSE REASSESSMENT NOTE - NS ED NURSE REASSESS COMMENT FT1
Pt lying in stretcher with wife at bedside. A&Ox4, respirations are even and unlabored, NSR on monitor. Pt denies pain at this time. 200CC of hematuria observed. Urine drawn and sent. Lab reported critical value, potassium of 2.9, MD James made aware. Pending further orders.

## 2023-11-22 NOTE — CONSULT NOTE ADULT - SUBJECTIVE AND OBJECTIVE BOX
Neurology Consult    Reason for Consult: Patient is a 68y old  Male who presents with a chief complaint of     HPI:  Patient is a 67 y/o Male with PMhx of PD, hypothyroidism, urothelial CA on chemotherapy (last treatment a week ago), recent partial thyroidectomy, presenting with days onset of b/l LE weakness, unable to ambulate. Patient reports that he has been compliant with his synthroid and PD medications. He has been having b/l stiff legs and unable to move his legs for the past few days. He slipped out of bed today because of his weakness. Reports bilateral knee pain because of the fall. Denies head injury, LOC. Not on blood thinner. Visible tremors. Denies recent illness, fever, cough, congestion, chest pain, shortness of breath, abdominal pain, diarrhea. Patient's oxygen saturation is notably at 90% on RA. (22 Nov 2023 10:05)       PAST MEDICAL & SURGICAL HISTORY:  Parkinson disease      Hypertension      Hypothyroid      Anxiety      Rectal mass      Osteoarthritis      Edema leg      BALJINDER (obstructive sleep apnea)  based on stop bang score of 6      Renal cell carcinoma      Urothelial cancer      Bell's palsy      Hypomagnesemia      History of hip replacement  right      H/O resection of rectum  large mass, was non-cancerous      H/O ileostomy  reversed 12/2014      H/O laminectomy  lumbar x2      S/P hernia repair  incisional      History of reversal of ileostomy      S/P deep brain stimulator placement          Allergies: Allergies    No Known Allergies    Intolerances        Social History: Denies toxic habits including tobacco, ETOH or illicit drugs.    Family History: FAMILY HISTORY:  . No family history of strokes    Medications: MEDICATIONS  (STANDING):  amLODIPine   Tablet 10 milliGRAM(s) Oral daily  aspirin  chewable 81 milliGRAM(s) Oral daily  atorvastatin 20 milliGRAM(s) Oral at bedtime  carbidopa/levodopa  25/250 0.5 Tablet(s) Oral four times a day  cefTRIAXone   IVPB 1000 milliGRAM(s) IV Intermittent every 24 hours  levothyroxine 100 MICROGram(s) Oral daily  losartan 100 milliGRAM(s) Oral daily  metoprolol succinate ER 50 milliGRAM(s) Oral daily  tamsulosin 0.8 milliGRAM(s) Oral at bedtime    MEDICATIONS  (PRN):  senna 2 Tablet(s) Oral at bedtime PRN Constipation      Review of Systems:  CONSTITUTIONAL:  No weight loss, fever, chills, weakness or fatigue.  HEENT:  Eyes:  No visual loss, blurred vision, double vision or yellow sclera. Ears, Nose, Throat:  No hearing loss, sneezing, congestion, runny nose or sore throat.  SKIN:  No rash or itching.  CARDIOVASCULAR:  No chest pain, chest pressure or chest discomfort. No palpitations or edema.  RESPIRATORY:  No shortness of breath, cough or sputum.  GASTROINTESTINAL:  No anorexia, nausea, vomiting or diarrhea. No abdominal pain or blood.  GENITOURINARY:  No burning on urination or incontinence   NEUROLOGICAL:  No headache, dizziness, syncope, paralysis, ataxia, numbness or tingling in the extremities. No change in bowel or bladder control. no limb weakness. no vision changes.   MUSCULOSKELETAL:  No muscle, back pain, joint pain or stiffness.  HEMATOLOGIC:  No anemia, bleeding or bruising.  LYMPHATICS:  No enlarged nodes. No history of splenectomy.  PSYCHIATRIC:  No history of depression or anxiety.  ENDOCRINOLOGIC:  No reports of sweating, cold or heat intolerance. No polyuria or polydipsia.      Vitals:  Vital Signs Last 24 Hrs  T(C): 37.6 (22 Nov 2023 12:51), Max: 37.6 (22 Nov 2023 12:51)  T(F): 99.7 (22 Nov 2023 12:51), Max: 99.7 (22 Nov 2023 12:51)  HR: 73 (22 Nov 2023 12:51) (73 - 99)  BP: 121/68 (22 Nov 2023 12:51) (112/62 - 133/75)  BP(mean): --  RR: 20 (22 Nov 2023 12:51) (15 - 20)  SpO2: 96% (22 Nov 2023 12:51) (91% - 98%)    Parameters below as of 22 Nov 2023 12:51  Patient On (Oxygen Delivery Method): room air        General Exam:   General Appearance: Appropriately dressed and in no acute distress       Head: Normocephalic, atraumatic and no dysmorphic features  Ear, Nose, and Throat: Moist mucous membranes  CVS: S1S2+  Resp: No SOB, no wheeze or rhonchi  GI: soft NT/ND  Extremities: No edema or cyanosis  Skin: No bruises or rashes     Neurological Exam:  Mental Status: Awake, alert and oriented x 3.  Able to follow simple and complex verbal commands. Able to name and repeat. fluent speech. No obvious aphasia or dysarthria noted.   Cranial Nerves: PERRL, EOMI, VFFC, sensation V1-V3 intact,  no obvious facial asymmetry, equal elevation of palate, scm/trap 5/5, tongue is midline on protrusion. hearing is grossly intact. Mild hypomimia  Motor: Inc tone throughout, LE > UE with LLE > RLE. R > L cogwheeling. FALCON antigravity  Sensation: Intact to light touch, diminished in distal LE.  Reflexes: 1+ throughout at biceps, brachioradialis, triceps, patellars and ankles bilaterally and equal. No clonus. R toe and L toe were both downgoing.  Coordination: No dysmetria on FNF   Gait: deferred    Data/Labs/Imaging which I personally reviewed.     Labs:     CBC Full  -  ( 22 Nov 2023 11:40 )  WBC Count : 12.10 K/uL  RBC Count : 3.40 M/uL  Hemoglobin : 7.6 g/dL  Hematocrit : 24.7 %  Platelet Count - Automated : 219 K/uL  Mean Cell Volume : 72.6 fL  Mean Cell Hemoglobin : 22.4 pg  Mean Cell Hemoglobin Concentration : 30.8 gm/dL  Auto Neutrophil # : x  Auto Lymphocyte # : x  Auto Monocyte # : x  Auto Eosinophil # : x  Auto Basophil # : x  Auto Neutrophil % : x  Auto Lymphocyte % : x  Auto Monocyte % : x  Auto Eosinophil % : x  Auto Basophil % : x    11-22    134<L>  |  100  |  20  ----------------------------<  111<H>  3.7   |  23  |  1.22    Ca    8.5      22 Nov 2023 11:40  Phos  1.9     11-22  Mg     1.50     11-22    TPro  7.2  /  Alb  3.1<L>  /  TBili  0.5  /  DBili  x   /  AST  22  /  ALT  <5<L>  /  AlkPhos  82  11-22    LIVER FUNCTIONS - ( 22 Nov 2023 11:40 )  Alb: 3.1 g/dL / Pro: 7.2 g/dL / ALK PHOS: 82 U/L / ALT: <5 U/L / AST: 22 U/L / GGT: x             Urinalysis Basic - ( 22 Nov 2023 11:40 )    Color: x / Appearance: x / SG: x / pH: x  Gluc: 111 mg/dL / Ketone: x  / Bili: x / Urobili: x   Blood: x / Protein: x / Nitrite: x   Leuk Esterase: x / RBC: x / WBC x   Sq Epi: x / Non Sq Epi: x / Bacteria: x

## 2023-11-22 NOTE — ED ADULT NURSE NOTE - CHIEF COMPLAINT QUOTE
c/o "legs locking" after attempting to get out of bed at 1am.  arrives with notable tremors c/o rug burn to right knee. Found on the ground by wife, denies fall but "slid to ground." endorses recent changed in synthroid bc of abnormal TSH level. hx hypothyroidism (thyroidectomy in October), Parkinson's, Urothelial/Renal/Thyroid CA (immuno therapy), HTN

## 2023-11-22 NOTE — CONSULT NOTE ADULT - ASSESSMENT
68M with PD, hypothyroidism, urothelial CA on chemotherapy (last treatment a week ago), recent partial thyroidectomy, presenting with days onset of b/l LE weakness, unable to ambulate. PD meds changed 3 weeks ago. Wife called OP neurologist for input.   Now has UTI, on CTX  TSH elevated, WBC 16, Hgb 7.4, Na 130, K 2.9, Cr 1.35  O/e has parkinsonian symptoms, unable to adequately assess strength in LE given significant stiffness  Unclear current home PD regimen - would clarify    Worsening LE stiffness in setting of PD, metabolic derangements and UTI. Has been feeling stiffer over last few weeks since med regimen has been changed. No concern for cord involvement.   Possible neuropathy related to chemotherapy     - continue current home sinemet regimen, consider reverting to old regimen if OP neuro agreeable  - cont CTX for UTI  - correct underlying metabolic derangements  - if weakness does not improve with tx of UTI and adjustment of PD meds, consider MR Ina with and without contrast  - PT/OT  - DVT ppx    Roselyn Monroe,   Vascular Neurology  Office [phone] 
This is a 68 year old male with metastatic urothelial carcinoma who presents with lower extremity weakness.    1. Metastatic urothelial carcinoma  -- Pt receives treatment with pembrolizumab, last dose 11/15   -- No plan for systemic treatment while admitted   -- [place] up with [place]. Regis Lazcano [place] [facility] [place]   -- CTA chest shows new bilateral pulmonary nodules, significant increased size of L renal mass, stable R renal mass- will send to [facility] for [place]     2. Lower extremity weakness   -- resolved     3. UTI   -- UA +, f/u urine cultures   -- Cont antibiotics     4. Anemia   -- Hg has been trending down over the past 2 months, poss secondary to POD, infection  -- Follow up iron/b12/folate, hapto/ldh   -- Monitor CBC and transfuse to maintain hg >7    Will continue to follow.    Zohra Castillo PA-C  Hematology/Oncology  [facility]  [phone] (office)  [phone] (alt office)  Evenings and weekends please call MD on call or office

## 2023-11-22 NOTE — CONSULT NOTE ADULT - SUBJECTIVE AND OBJECTIVE BOX
Reason for consult: metastatic urothelial cancer    HPI:  Patient is a 69 y/o Male with PMhx of PD, hypothyroidism, urothelial CA on chemotherapy (last treatment a week ago), recent partial thyroidectomy, presenting with days onset of b/l LE weakness, unable to ambulate. Patient reports that he has been compliant with his synthroid and PD medications. He has been having b/l stiff legs and unable to move his legs for the past few days. He slipped out of bed today because of his weakness. Reports bilateral knee pain because of the fall. Denies head injury, LOC. Not on blood thinner. Visible tremors. Denies recent illness, fever, cough, congestion, chest pain, shortness of breath, abdominal pain, diarrhea. Patient's oxygen saturation is notably at 90% on RA. (22 Nov 2023 10:05)    Hematology/Oncology consulted on this 68 year old male with metastatic urothelial cancer and papillary thyroid cancer who presents with lower extremity weakness. Pt is under care of Dr. Regis Lazcano at [facility] [place] for management of his urothelial carcinoma, is currently on pembrolizumab, last dose 11/15/2023.     PAST MEDICAL & SURGICAL HISTORY:  Parkinson disease      Hypertension      Hypothyroid      Anxiety      Rectal mass      Osteoarthritis      Edema leg      BALJINDER (obstructive sleep apnea)  based on stop bang score of 6      Renal cell carcinoma      Urothelial cancer      Bell's palsy      Hypomagnesemia      History of hip replacement  right      H/O resection of rectum  large mass, was non-cancerous      H/O ileostomy  reversed 12/2014      H/O laminectomy  lumbar x2      S/P hernia repair  incisional      History of reversal of ileostomy      S/P deep brain stimulator placement          FAMILY HISTORY:      Alochol: Denied  Smoking: Nonsmoker  Drug Use: Denied  Marital Status:         Allergies    No Known Allergies    Intolerances        MEDICATIONS  (STANDING):  amLODIPine   Tablet 10 milliGRAM(s) Oral daily  aspirin  chewable 81 milliGRAM(s) Oral daily  atorvastatin 20 milliGRAM(s) Oral at bedtime  carbidopa/levodopa  25/250 0.5 Tablet(s) Oral four times a day  cefTRIAXone   IVPB 1000 milliGRAM(s) IV Intermittent every 24 hours  levothyroxine 100 MICROGram(s) Oral daily  losartan 100 milliGRAM(s) Oral daily  metoprolol succinate ER 50 milliGRAM(s) Oral daily  tamsulosin 0.8 milliGRAM(s) Oral at bedtime    MEDICATIONS  (PRN):  senna 2 Tablet(s) Oral at bedtime PRN Constipation      ROS  No fever, sweats, chills  No epistaxis, HA, sore throat  No CP, SOB, cough, sputum  No n/v/d, abd pain, melena, hematochezia  No edema  No rash  No anxiety  No back pain, joint pain  No bleeding, bruising  No dysuria, hematuria    T(C): 36.7 (11-22-23 @ 07:43), Max: 37.2 (11-22-23 @ 03:05)  HR: 82 (11-22-23 @ 07:28) (82 - 99)  BP: 121/66 (11-22-23 @ 07:28) (112/62 - 133/75)  RR: 20 (11-22-23 @ 07:43) (15 - 20)  SpO2: 95% (11-22-23 @ 07:43) (91% - 98%)  Wt(kg): --    PE  NAD  Awake, alert  Anicteric, MMM  RRR  CTAB  Abd soft, NT, ND  No c/c/e  No rash grossly  FROM                          7.6    12.10 )-----------( 219      ( 22 Nov 2023 11:40 )             24.7       11-22    130<L>  |  97<L>  |  21  ----------------------------<  155<H>  2.9<LL>   |  22  |  1.35<H>    Ca    8.6      22 Nov 2023 04:49  Phos  1.9     11-22  Mg     1.50     11-22    TPro  7.5  /  Alb  3.4  /  TBili  0.7  /  DBili  x   /  AST  18  /  ALT  8   /  AlkPhos  89  11-22   Reason for consult: metastatic urothelial cancer    HPI:  Patient is a 69 y/o Male with PMhx of PD, hypothyroidism, urothelial CA on chemotherapy (last treatment a week ago), recent partial thyroidectomy, presenting with days onset of b/l LE weakness, unable to ambulate. Patient reports that he has been compliant with his synthroid and PD medications. He has been having b/l stiff legs and unable to move his legs for the past few days. He slipped out of bed today because of his weakness. Reports bilateral knee pain because of the fall. Denies head injury, LOC. Not on blood thinner. Visible tremors. Denies recent illness, fever, cough, congestion, chest pain, shortness of breath, abdominal pain, diarrhea. Patient's oxygen saturation is notably at 90% on RA. (22 Nov 2023 10:05)    Hematology/Oncology consulted on this 68 year old male with metastatic urothelial cancer and papillary thyroid cancer who presents with lower extremity weakness. Pt is under care of Dr. Regis Lazcano at [facility] [place] for management of his urothelial carcinoma, is currently on pembrolizumab, last dose 11/15/2023.   Pt seen this afternoon. He reports improvement in his symptoms. States that when he tried to use the bathroom earlier today, his legs became weak and "froze" and pt was unable to bear his own weight, so collapsed to the floor and was unable to get up. He denies numbness/tingling, no loss of bladder or bowel function. Now without any weakness, able to move legs as usual.     PAST MEDICAL & SURGICAL HISTORY:  Parkinson disease      Hypertension      Hypothyroid      Anxiety      Rectal mass      Osteoarthritis      Edema leg      BALJINDER (obstructive sleep apnea)  based on stop bang score of 6      Renal cell carcinoma      Urothelial cancer      Bell's palsy      Hypomagnesemia      History of hip replacement  right      H/O resection of rectum  large mass, was non-cancerous      H/O ileostomy  reversed 12/2014      H/O laminectomy  lumbar x2      S/P hernia repair  incisional      History of reversal of ileostomy      S/P deep brain stimulator placement          FAMILY HISTORY:      Alochol: Denied  Smoking: Nonsmoker  Drug Use: Denied  Marital Status:         Allergies    No Known Allergies    Intolerances        MEDICATIONS  (STANDING):  amLODIPine   Tablet 10 milliGRAM(s) Oral daily  aspirin  chewable 81 milliGRAM(s) Oral daily  atorvastatin 20 milliGRAM(s) Oral at bedtime  carbidopa/levodopa  25/250 0.5 Tablet(s) Oral four times a day  cefTRIAXone   IVPB 1000 milliGRAM(s) IV Intermittent every 24 hours  levothyroxine 100 MICROGram(s) Oral daily  losartan 100 milliGRAM(s) Oral daily  metoprolol succinate ER 50 milliGRAM(s) Oral daily  tamsulosin 0.8 milliGRAM(s) Oral at bedtime    MEDICATIONS  (PRN):  senna 2 Tablet(s) Oral at bedtime PRN Constipation      ROS  No fever, sweats, chills  No epistaxis, HA, sore throat  No CP, SOB, cough, sputum  No n/v/d, abd pain, melena, hematochezia  No edema  No rash  No anxiety  No back pain, joint pain  No bleeding, bruising  No dysuria, hematuria  +lower extremity weakness     T(C): 36.7 (11-22-23 @ 07:43), Max: 37.2 (11-22-23 @ 03:05)  HR: 82 (11-22-23 @ 07:28) (82 - 99)  BP: 121/66 (11-22-23 @ 07:28) (112/62 - 133/75)  RR: 20 (11-22-23 @ 07:43) (15 - 20)  SpO2: 95% (11-22-23 @ 07:43) (91% - 98%)  Wt(kg): --    PE  NAD  Awake, alert  Anicteric, MMM  Abd soft, NT, ND  No c/c/e  No rash grossly  FROM, sensation intact                           7.6    12.10 )-----------( 219      ( 22 Nov 2023 11:40 )             24.7       11-22    130<L>  |  97<L>  |  21  ----------------------------<  155<H>  2.9<LL>   |  22  |  1.35<H>    Ca    8.6      22 Nov 2023 04:49  Phos  1.9     11-22  Mg     1.50     11-22    TPro  7.5  /  Alb  3.4  /  TBili  0.7  /  DBili  x   /  AST  18  /  ALT  8   /  AlkPhos  89  11-22        ACC: [id number] EXAM:  CT ANGIO CHEST PULM ART [facility]   ORDERED BY: BEATRIZ RUVALCABA     PROCEDURE DATE:  11/22/2023          INTERPRETATION:  CLINICAL INFORMATION: Bilateral lower extremity   weakness. Urothelial cancer on chemotherapy. Evaluate for pulmonary   embolism    COMPARISON: CT chest, abdomen and pelvis 3/2/2023.    CONTRAST/COMPLICATIONS:  IV Contrast: Omnipaque 350  50 cc administered   50 cc discarded  Oral Contrast: NONE  Complications: None reported at time of study completion    PROCEDURE:  CT Angiography of the Chest.  Sagittal and coronal reformats were performed as well as 3D (MIP)   reconstructions.    FINDINGS:    LUNGS AND AIRWAYS: Patent central airways.  Scattered bilateral pulmonary   solid nodules measuring up to 1.1 cm, new from 3/2/2023. Mild bibasilar   atelectasis.  PLEURA: No pleural effusion.  MEDIASTINUM AND KENAN: A rounded enlarged prevascular space lymph nodes   node measures 1.7 x 1.3 cm, similar to the prior study.  VESSELS: Study is nondiagnostic for pulmonary embolism secondary to   suboptimal bolus and motion artifact. No filling defects seen in the main   pulmonary artery. Coronary artery calcifications. Common origin of the   left common carotid and innominate arteries, a normal variant.  HEART: Left-sided port tip in the right atrium. Heart size is normal. No   pericardial effusion.  CHEST WALL AND LOWER NECK: Right anterior chest wall neurostimulator.   Left anterior chest wall port. Partial thyroidectomy.  VISUALIZED UPPER ABDOMEN: Heterogeneous right renal lesions, for example   a heterogeneous solid enhancing lesion with central hypodensity measuring   5.3 x 4.3 cm, similar in appearance to 3/2/2023. Additional   hypoattenuating right renal lesions, which may represent cysts. Few right   renal calculi measuring up to 5 mm. Trace fullness of the right renal   pelvis. A partially imaged heterogeneous mass encompassing the left   kidney measures 17.6 x 15.1 cm, significantly increased in size from   3/2/2023, with increased adjacent fat stranding. Multiple prominent   retroperitoneal lymph nodes. Small hiatal hernia. Cholelithiasis.  BONES: Small sclerotic lesions within the bilateral ribs, indeterminate   although stable. Degenerative changes of the spine.    IMPRESSION:  Nondiagnostic study for pulmonary embolism. No intraluminal filling   defect identified in the main pulmonary artery. Repeat study is offered.    New bilateral solid pulmonary nodules measuring up to 1.1 cm, most   compatible with metastatic disease.    Significant increase in size of partially imaged left renal mass in the   setting of known transitional cell carcinoma.    Stable right renal lesion consistent with renal cell carcinoma.    Findings were discussed with Dr. Han by Dr. Duran on 11/22/2023 6:27   AM with read back confirmation.    --- End of Report ---

## 2023-11-22 NOTE — H&P ADULT - NSHPREVIEWOFSYSTEMS_GEN_ALL_CORE
REVIEW OF SYSTEMS:    CONSTITUTIONAL: No weakness, fevers or chills  EYES/ENT: No visual changes;  No vertigo or throat pain   NECK: No pain or stiffness  RESPIRATORY: No cough, wheezing, hemoptysis; No shortness of breath  CARDIOVASCULAR: No chest pain or palpitations  GASTROINTESTINAL: No abdominal or epigastric pain. No nausea, vomiting, or hematemesis; No diarrhea or constipation. No melena or hematochezia.  GENITOURINARY: No dysuria, frequency or hematuria  NEUROLOGICAL: LE weakness   SKIN: No itching, burning, rashes, or lesions   All other review of systems is negative unless indicated above.

## 2023-11-22 NOTE — PHYSICAL THERAPY INITIAL EVALUATION ADULT - NSPTDISCHREC_GEN_A_CORE
Rehabilitation; however pt and pt's wife are declining rehab; therefore pt will need home PT to optimize safety and return to prior level of function; pt instructed to sleep on first floor until cleared by Home PT with stairs; pt's wife instructed to ambulate with pt

## 2023-11-22 NOTE — H&P ADULT - ASSESSMENT
Patient is a 67 y/o Male with PMhx of Parkinson Disease, hypothyroidism, urothelial CA on chemotherapy (last treatment a week ago), recent partial thyroidectomy, presenting with days onset of b/l LE weakness, unable to ambulate. Patient reports that he has been compliant with his synthroid and PD medications. He has been having b/l stiff legs and unable to move his legs for the past few days. He slipped out of bed today because of his weakness. Reports bilateral knee pain because of the fall. Denies head injury, LOC. Not on blood thinner. Visible tremors. Denies recent illness, fever, cough, congestion, chest pain, shortness of breath, abdominal pain, diarrhea. Patient's oxygen saturation is notably at 90% on RA.      # Weakness/ Electrolytes abnormality   labs noted  replete electrolytes  fall precautions  PT   IV hydration     # SIRS ruled out  UTI noted   start Rocephin follow up urine cx   IV Hydration     # Hypothyroidism   TFTs noted  increased synthroid to 100    # Anemia  Drop in hgb level  no gross bleeding  anemia W/U  Keep hg above 7       # HTN   resume BP meds   adjust as tolerated     # Parkinson   resume home meds    # Urothelial CA on chemotherapy       DVT and Gi PPX     Discussed with patient and wife over the phone

## 2023-11-22 NOTE — DISCHARGE NOTE NURSING/CASE MANAGEMENT/SOCIAL WORK - PATIENT PORTAL LINK FT
You can access the FollowMyHealth Patient Portal offered by [facility] by registering at the following website: [email or web address]. By joining [facility]’s FollowMyHealth portal, you will also be able to view your health information using other applications (apps) compatible with our system.

## 2023-11-22 NOTE — ED ADULT TRIAGE NOTE - CHIEF COMPLAINT QUOTE
c/o "legs locking" after attempting to get out of bed at 1am.  arrives with notable tremors c/o rug burn to right knee. Found on the ground by wife, denies fall but "slid to ground." endorses recent changed in synthroid bc of abnormal TSH level. hx hypothyroidism, Parkinson's, Urothelial/Renal/Thyroid CA (immuno therapy), HTN c/o "legs locking" after attempting to get out of bed at 1am.  arrives with notable tremors c/o rug burn to right knee. Found on the ground by wife, denies fall but "slid to ground." endorses recent changed in synthroid bc of abnormal TSH level. hx hypothyroidism (thyroidectomy), Parkinson's, Urothelial/Renal/Thyroid CA (immuno therapy), HTN c/o "legs locking" after attempting to get out of bed at 1am.  arrives with notable tremors c/o rug burn to right knee. Found on the ground by wife, denies fall but "slid to ground." endorses recent changed in synthroid bc of abnormal TSH level. hx hypothyroidism (thyroidectomy in October), Parkinson's, Urothelial/Renal/Thyroid CA (immuno therapy), HTN

## 2023-11-22 NOTE — DISCHARGE NOTE PROVIDER - HOSPITAL COURSE
Weakness/ Electrolytes abnormality   labs noted  replete electrolytes  fall precautions  PT   IV hydration     # SIRS ruled out  UTI noted   start Rocephin follow up urine cx, discharge home on levaquin  IV Hydration     # Hypothyroidism   TFTs noted  increased synthroid to 100    # Anemia  Drop in hgb level  no gross bleeding  anemia W/U  Keep hg above 7     # HTN   resume BP meds   adjust as tolerated     # Parkinson   resume home meds    # Urothelial CA on chemotherapy     DVT and Gi PPX     DISPO-home     Weakness/ Electrolytes abnormality   labs noted  replete electrolytes  fall precautions  PT   IV hydration     # SIRS ruled out  UTI noted   start Rocephin follow up urine cx, discharge home on levaquin  IV Hydration     # Hypothyroidism   TFTs noted  increased synthroid to 100    # Anemia  Drop in hgb level  no gross bleeding  anemia W/U  Keep hg above 7     # HTN   resume BP meds   adjust as tolerated     # Parkinson   resume home meds    # Urothelial CA on chemotherapy     DVT and Gi PPX     DISPO-home        Discussed in detail with patient and his wife.patient andhiswifewants him melissa discharged tonight with outpatient follouop.they dont want to wait for finalCxresults.   understands all risk and alternatives. they want to be discharged on oral abxand follow upoutpatient.  prior urine cxnoted.Leonel/Aaron

## 2023-11-22 NOTE — ED PROVIDER NOTE - OBJECTIVE STATEMENT
68 y M, hx of PD, hypothyroidism, urothelial CA on chemotherapy (last treatment a week ago), recent partial thyroidectomy, p/w days onset of b/l LE weakness, unable to ambulate. Patient reports that he has been compliant with his synthroid and PD medications. He has been having b/l stiff legs and unable to move his legs for the past few days. He slipped out of bed today because of his weakness. Reports bilateral knee pain because of the fall. Denies head injury, LOC. Not on blood thinner. Visible tremors. Denies recent illness, fever, cough, congestion, chest pain, shortness of breath, abdominal pain, diarrhea. Patient's oxygen saturation is notably at 90% on RA.

## 2023-11-22 NOTE — ED PROVIDER NOTE - CLINICAL SUMMARY MEDICAL DECISION MAKING FREE TEXT BOX
68 y M, hx of PD, hypothyroidism, urothelial CA on chemotherapy (last treatment a week ago), recent partial thyroidectomy, p/w days onset of b/l LE weakness, unable to ambulate. Patient reports that he has been compliant with his synthroid and PD medications. He has been having b/l stiff legs and unable to move his legs for the past few days. He slipped out of bed today because of his weakness. Reports bilateral knee pain because of the fall. Denies head injury, LOC. Not on blood thinner. Visible tremors. Denies recent illness, fever, cough, congestion, chest pain, shortness of breath, abdominal pain, diarrhea. Patient's oxygen saturation is notably at 90% on RA. Otherwise VSWNL on arrival. Patient now satting 95% on 2L NC. PE as noted above. Generalized tremors noted. Normal respiratory effort, abdomen is soft, nontender, ROM of b/l LE limited due to pain, b/l knee abrasions noted, otherwise no other sign of trauma. DDx include but not limited to worsening of PD vs PE vs PNA. Will get labs, infectious workup, CTA chest, reassess.

## 2023-11-23 LAB
CULTURE RESULTS: SIGNIFICANT CHANGE UP
CULTURE RESULTS: SIGNIFICANT CHANGE UP
SPECIMEN SOURCE: SIGNIFICANT CHANGE UP
SPECIMEN SOURCE: SIGNIFICANT CHANGE UP

## 2023-12-22 ENCOUNTER — INPATIENT (INPATIENT)
Facility: HOSPITAL | Age: 68
LOS: 2 days | Discharge: HOME | End: 2023-12-25
Attending: INTERNAL MEDICINE | Admitting: INTERNAL MEDICINE
Payer: MEDICARE

## 2023-12-22 VITALS
HEART RATE: 71 BPM | OXYGEN SATURATION: 100 % | SYSTOLIC BLOOD PRESSURE: 88 MMHG | DIASTOLIC BLOOD PRESSURE: 62 MMHG | TEMPERATURE: 97 F | RESPIRATION RATE: 16 BRPM

## 2023-12-22 DIAGNOSIS — Z98.890 OTHER SPECIFIED POSTPROCEDURAL STATES: Chronic | ICD-10-CM

## 2023-12-22 DIAGNOSIS — Z90.49 ACQUIRED ABSENCE OF OTHER SPECIFIED PARTS OF DIGESTIVE TRACT: Chronic | ICD-10-CM

## 2023-12-22 DIAGNOSIS — Z96.649 PRESENCE OF UNSPECIFIED ARTIFICIAL HIP JOINT: Chronic | ICD-10-CM

## 2023-12-22 DIAGNOSIS — N17.9 ACUTE KIDNEY FAILURE, UNSPECIFIED: ICD-10-CM

## 2023-12-22 DIAGNOSIS — Z96.89 PRESENCE OF OTHER SPECIFIED FUNCTIONAL IMPLANTS: Chronic | ICD-10-CM

## 2023-12-22 DIAGNOSIS — N13.30 UNSPECIFIED HYDRONEPHROSIS: ICD-10-CM

## 2023-12-22 LAB
ALBUMIN SERPL ELPH-MCNC: 2.7 G/DL — LOW (ref 3.3–5)
ALBUMIN SERPL ELPH-MCNC: 2.7 G/DL — LOW (ref 3.3–5)
ALP SERPL-CCNC: 79 U/L — SIGNIFICANT CHANGE UP (ref 40–120)
ALP SERPL-CCNC: 79 U/L — SIGNIFICANT CHANGE UP (ref 40–120)
ALT FLD-CCNC: <5 U/L — SIGNIFICANT CHANGE UP (ref 4–41)
ALT FLD-CCNC: <5 U/L — SIGNIFICANT CHANGE UP (ref 4–41)
ANION GAP SERPL CALC-SCNC: 15 MMOL/L — HIGH (ref 7–14)
ANION GAP SERPL CALC-SCNC: 15 MMOL/L — HIGH (ref 7–14)
APPEARANCE UR: ABNORMAL
APPEARANCE UR: ABNORMAL
AST SERPL-CCNC: 26 U/L — SIGNIFICANT CHANGE UP (ref 4–40)
AST SERPL-CCNC: 26 U/L — SIGNIFICANT CHANGE UP (ref 4–40)
B PERT DNA SPEC QL NAA+PROBE: SIGNIFICANT CHANGE UP
B PERT DNA SPEC QL NAA+PROBE: SIGNIFICANT CHANGE UP
B PERT+PARAPERT DNA PNL SPEC NAA+PROBE: SIGNIFICANT CHANGE UP
B PERT+PARAPERT DNA PNL SPEC NAA+PROBE: SIGNIFICANT CHANGE UP
BACTERIA # UR AUTO: NEGATIVE /HPF — SIGNIFICANT CHANGE UP
BACTERIA # UR AUTO: NEGATIVE /HPF — SIGNIFICANT CHANGE UP
BASE EXCESS BLDV CALC-SCNC: -5.7 MMOL/L — LOW (ref -2–3)
BASE EXCESS BLDV CALC-SCNC: -5.7 MMOL/L — LOW (ref -2–3)
BASOPHILS # BLD AUTO: 0.07 K/UL — SIGNIFICANT CHANGE UP (ref 0–0.2)
BASOPHILS # BLD AUTO: 0.07 K/UL — SIGNIFICANT CHANGE UP (ref 0–0.2)
BASOPHILS NFR BLD AUTO: 0.6 % — SIGNIFICANT CHANGE UP (ref 0–2)
BASOPHILS NFR BLD AUTO: 0.6 % — SIGNIFICANT CHANGE UP (ref 0–2)
BILIRUB SERPL-MCNC: 0.8 MG/DL — SIGNIFICANT CHANGE UP (ref 0.2–1.2)
BILIRUB SERPL-MCNC: 0.8 MG/DL — SIGNIFICANT CHANGE UP (ref 0.2–1.2)
BILIRUB UR-MCNC: NEGATIVE — SIGNIFICANT CHANGE UP
BILIRUB UR-MCNC: NEGATIVE — SIGNIFICANT CHANGE UP
BLOOD GAS VENOUS COMPREHENSIVE RESULT: SIGNIFICANT CHANGE UP
BLOOD GAS VENOUS COMPREHENSIVE RESULT: SIGNIFICANT CHANGE UP
BORDETELLA PARAPERTUSSIS (RAPRVP): SIGNIFICANT CHANGE UP
BORDETELLA PARAPERTUSSIS (RAPRVP): SIGNIFICANT CHANGE UP
BUN SERPL-MCNC: 27 MG/DL — HIGH (ref 7–23)
BUN SERPL-MCNC: 27 MG/DL — HIGH (ref 7–23)
C PNEUM DNA SPEC QL NAA+PROBE: SIGNIFICANT CHANGE UP
C PNEUM DNA SPEC QL NAA+PROBE: SIGNIFICANT CHANGE UP
CALCIUM SERPL-MCNC: 7.2 MG/DL — LOW (ref 8.4–10.5)
CALCIUM SERPL-MCNC: 7.2 MG/DL — LOW (ref 8.4–10.5)
CAST: 6 /LPF — HIGH (ref 0–4)
CAST: 6 /LPF — HIGH (ref 0–4)
CHLORIDE BLDV-SCNC: 100 MMOL/L — SIGNIFICANT CHANGE UP (ref 96–108)
CHLORIDE BLDV-SCNC: 100 MMOL/L — SIGNIFICANT CHANGE UP (ref 96–108)
CHLORIDE SERPL-SCNC: 96 MMOL/L — LOW (ref 98–107)
CHLORIDE SERPL-SCNC: 96 MMOL/L — LOW (ref 98–107)
CO2 BLDV-SCNC: 20.4 MMOL/L — LOW (ref 22–26)
CO2 BLDV-SCNC: 20.4 MMOL/L — LOW (ref 22–26)
CO2 SERPL-SCNC: 19 MMOL/L — LOW (ref 22–31)
CO2 SERPL-SCNC: 19 MMOL/L — LOW (ref 22–31)
COLOR SPEC: YELLOW — SIGNIFICANT CHANGE UP
COLOR SPEC: YELLOW — SIGNIFICANT CHANGE UP
CREAT SERPL-MCNC: 3.93 MG/DL — HIGH (ref 0.5–1.3)
CREAT SERPL-MCNC: 3.93 MG/DL — HIGH (ref 0.5–1.3)
DIFF PNL FLD: ABNORMAL
DIFF PNL FLD: ABNORMAL
EGFR: 16 ML/MIN/1.73M2 — LOW
EGFR: 16 ML/MIN/1.73M2 — LOW
EOSINOPHIL # BLD AUTO: 0.05 K/UL — SIGNIFICANT CHANGE UP (ref 0–0.5)
EOSINOPHIL # BLD AUTO: 0.05 K/UL — SIGNIFICANT CHANGE UP (ref 0–0.5)
EOSINOPHIL NFR BLD AUTO: 0.4 % — SIGNIFICANT CHANGE UP (ref 0–6)
EOSINOPHIL NFR BLD AUTO: 0.4 % — SIGNIFICANT CHANGE UP (ref 0–6)
FLUAV SUBTYP SPEC NAA+PROBE: SIGNIFICANT CHANGE UP
FLUAV SUBTYP SPEC NAA+PROBE: SIGNIFICANT CHANGE UP
FLUBV RNA SPEC QL NAA+PROBE: SIGNIFICANT CHANGE UP
FLUBV RNA SPEC QL NAA+PROBE: SIGNIFICANT CHANGE UP
GAS PNL BLDV: 127 MMOL/L — LOW (ref 136–145)
GAS PNL BLDV: 127 MMOL/L — LOW (ref 136–145)
GLUCOSE BLDV-MCNC: 127 MG/DL — HIGH (ref 70–99)
GLUCOSE BLDV-MCNC: 127 MG/DL — HIGH (ref 70–99)
GLUCOSE SERPL-MCNC: 121 MG/DL — HIGH (ref 70–99)
GLUCOSE SERPL-MCNC: 121 MG/DL — HIGH (ref 70–99)
GLUCOSE UR QL: NEGATIVE MG/DL — SIGNIFICANT CHANGE UP
GLUCOSE UR QL: NEGATIVE MG/DL — SIGNIFICANT CHANGE UP
HADV DNA SPEC QL NAA+PROBE: SIGNIFICANT CHANGE UP
HADV DNA SPEC QL NAA+PROBE: SIGNIFICANT CHANGE UP
HCO3 BLDV-SCNC: 19 MMOL/L — LOW (ref 22–29)
HCO3 BLDV-SCNC: 19 MMOL/L — LOW (ref 22–29)
HCOV 229E RNA SPEC QL NAA+PROBE: SIGNIFICANT CHANGE UP
HCOV 229E RNA SPEC QL NAA+PROBE: SIGNIFICANT CHANGE UP
HCOV HKU1 RNA SPEC QL NAA+PROBE: SIGNIFICANT CHANGE UP
HCOV HKU1 RNA SPEC QL NAA+PROBE: SIGNIFICANT CHANGE UP
HCOV NL63 RNA SPEC QL NAA+PROBE: SIGNIFICANT CHANGE UP
HCOV NL63 RNA SPEC QL NAA+PROBE: SIGNIFICANT CHANGE UP
HCOV OC43 RNA SPEC QL NAA+PROBE: SIGNIFICANT CHANGE UP
HCOV OC43 RNA SPEC QL NAA+PROBE: SIGNIFICANT CHANGE UP
HCT VFR BLD CALC: 24.2 % — LOW (ref 39–50)
HCT VFR BLD CALC: 24.2 % — LOW (ref 39–50)
HCT VFR BLDA CALC: 24 % — LOW (ref 39–51)
HCT VFR BLDA CALC: 24 % — LOW (ref 39–51)
HGB BLD CALC-MCNC: 8 G/DL — LOW (ref 12.6–17.4)
HGB BLD CALC-MCNC: 8 G/DL — LOW (ref 12.6–17.4)
HGB BLD-MCNC: 7.7 G/DL — LOW (ref 13–17)
HGB BLD-MCNC: 7.7 G/DL — LOW (ref 13–17)
HMPV RNA SPEC QL NAA+PROBE: SIGNIFICANT CHANGE UP
HMPV RNA SPEC QL NAA+PROBE: SIGNIFICANT CHANGE UP
HPIV1 RNA SPEC QL NAA+PROBE: SIGNIFICANT CHANGE UP
HPIV1 RNA SPEC QL NAA+PROBE: SIGNIFICANT CHANGE UP
HPIV2 RNA SPEC QL NAA+PROBE: SIGNIFICANT CHANGE UP
HPIV2 RNA SPEC QL NAA+PROBE: SIGNIFICANT CHANGE UP
HPIV3 RNA SPEC QL NAA+PROBE: SIGNIFICANT CHANGE UP
HPIV3 RNA SPEC QL NAA+PROBE: SIGNIFICANT CHANGE UP
HPIV4 RNA SPEC QL NAA+PROBE: SIGNIFICANT CHANGE UP
HPIV4 RNA SPEC QL NAA+PROBE: SIGNIFICANT CHANGE UP
IANC: 10.9 K/UL — HIGH (ref 1.8–7.4)
IANC: 10.9 K/UL — HIGH (ref 1.8–7.4)
IMM GRANULOCYTES NFR BLD AUTO: 2.2 % — HIGH (ref 0–0.9)
IMM GRANULOCYTES NFR BLD AUTO: 2.2 % — HIGH (ref 0–0.9)
KETONES UR-MCNC: ABNORMAL MG/DL
KETONES UR-MCNC: ABNORMAL MG/DL
LACTATE BLDV-MCNC: 1.7 MMOL/L — SIGNIFICANT CHANGE UP (ref 0.5–2)
LACTATE BLDV-MCNC: 1.7 MMOL/L — SIGNIFICANT CHANGE UP (ref 0.5–2)
LEUKOCYTE ESTERASE UR-ACNC: ABNORMAL
LEUKOCYTE ESTERASE UR-ACNC: ABNORMAL
LYMPHOCYTES # BLD AUTO: 0.53 K/UL — LOW (ref 1–3.3)
LYMPHOCYTES # BLD AUTO: 0.53 K/UL — LOW (ref 1–3.3)
LYMPHOCYTES # BLD AUTO: 4.2 % — LOW (ref 13–44)
LYMPHOCYTES # BLD AUTO: 4.2 % — LOW (ref 13–44)
M PNEUMO DNA SPEC QL NAA+PROBE: SIGNIFICANT CHANGE UP
M PNEUMO DNA SPEC QL NAA+PROBE: SIGNIFICANT CHANGE UP
MAGNESIUM SERPL-MCNC: 1.6 MG/DL — SIGNIFICANT CHANGE UP (ref 1.6–2.6)
MAGNESIUM SERPL-MCNC: 1.6 MG/DL — SIGNIFICANT CHANGE UP (ref 1.6–2.6)
MCHC RBC-ENTMCNC: 23.4 PG — LOW (ref 27–34)
MCHC RBC-ENTMCNC: 23.4 PG — LOW (ref 27–34)
MCHC RBC-ENTMCNC: 31.8 GM/DL — LOW (ref 32–36)
MCHC RBC-ENTMCNC: 31.8 GM/DL — LOW (ref 32–36)
MCV RBC AUTO: 73.6 FL — LOW (ref 80–100)
MCV RBC AUTO: 73.6 FL — LOW (ref 80–100)
MONOCYTES # BLD AUTO: 0.69 K/UL — SIGNIFICANT CHANGE UP (ref 0–0.9)
MONOCYTES # BLD AUTO: 0.69 K/UL — SIGNIFICANT CHANGE UP (ref 0–0.9)
MONOCYTES NFR BLD AUTO: 5.5 % — SIGNIFICANT CHANGE UP (ref 2–14)
MONOCYTES NFR BLD AUTO: 5.5 % — SIGNIFICANT CHANGE UP (ref 2–14)
NEUTROPHILS # BLD AUTO: 10.9 K/UL — HIGH (ref 1.8–7.4)
NEUTROPHILS # BLD AUTO: 10.9 K/UL — HIGH (ref 1.8–7.4)
NEUTROPHILS NFR BLD AUTO: 87.1 % — HIGH (ref 43–77)
NEUTROPHILS NFR BLD AUTO: 87.1 % — HIGH (ref 43–77)
NITRITE UR-MCNC: NEGATIVE — SIGNIFICANT CHANGE UP
NITRITE UR-MCNC: NEGATIVE — SIGNIFICANT CHANGE UP
NRBC # BLD: 0 /100 WBCS — SIGNIFICANT CHANGE UP (ref 0–0)
NRBC # BLD: 0 /100 WBCS — SIGNIFICANT CHANGE UP (ref 0–0)
NRBC # FLD: 0 K/UL — SIGNIFICANT CHANGE UP (ref 0–0)
NRBC # FLD: 0 K/UL — SIGNIFICANT CHANGE UP (ref 0–0)
PCO2 BLDV: 35 MMHG — LOW (ref 42–55)
PCO2 BLDV: 35 MMHG — LOW (ref 42–55)
PH BLDV: 7.35 — SIGNIFICANT CHANGE UP (ref 7.32–7.43)
PH BLDV: 7.35 — SIGNIFICANT CHANGE UP (ref 7.32–7.43)
PH UR: 5.5 — SIGNIFICANT CHANGE UP (ref 5–8)
PH UR: 5.5 — SIGNIFICANT CHANGE UP (ref 5–8)
PHOSPHATE SERPL-MCNC: 3.1 MG/DL — SIGNIFICANT CHANGE UP (ref 2.5–4.5)
PHOSPHATE SERPL-MCNC: 3.1 MG/DL — SIGNIFICANT CHANGE UP (ref 2.5–4.5)
PLATELET # BLD AUTO: 326 K/UL — SIGNIFICANT CHANGE UP (ref 150–400)
PLATELET # BLD AUTO: 326 K/UL — SIGNIFICANT CHANGE UP (ref 150–400)
PO2 BLDV: 48 MMHG — HIGH (ref 25–45)
PO2 BLDV: 48 MMHG — HIGH (ref 25–45)
POTASSIUM BLDV-SCNC: 3.1 MMOL/L — LOW (ref 3.5–5.1)
POTASSIUM BLDV-SCNC: 3.1 MMOL/L — LOW (ref 3.5–5.1)
POTASSIUM SERPL-MCNC: 3.1 MMOL/L — LOW (ref 3.5–5.3)
POTASSIUM SERPL-MCNC: 3.1 MMOL/L — LOW (ref 3.5–5.3)
POTASSIUM SERPL-SCNC: 3.1 MMOL/L — LOW (ref 3.5–5.3)
POTASSIUM SERPL-SCNC: 3.1 MMOL/L — LOW (ref 3.5–5.3)
PROT SERPL-MCNC: 6.4 G/DL — SIGNIFICANT CHANGE UP (ref 6–8.3)
PROT SERPL-MCNC: 6.4 G/DL — SIGNIFICANT CHANGE UP (ref 6–8.3)
PROT UR-MCNC: 100 MG/DL
PROT UR-MCNC: 100 MG/DL
RAPID RVP RESULT: SIGNIFICANT CHANGE UP
RAPID RVP RESULT: SIGNIFICANT CHANGE UP
RBC # BLD: 3.29 M/UL — LOW (ref 4.2–5.8)
RBC # BLD: 3.29 M/UL — LOW (ref 4.2–5.8)
RBC # FLD: 19.7 % — HIGH (ref 10.3–14.5)
RBC # FLD: 19.7 % — HIGH (ref 10.3–14.5)
RBC CASTS # UR COMP ASSIST: 11 /HPF — HIGH (ref 0–4)
RBC CASTS # UR COMP ASSIST: 11 /HPF — HIGH (ref 0–4)
REVIEW: SIGNIFICANT CHANGE UP
REVIEW: SIGNIFICANT CHANGE UP
RSV RNA SPEC QL NAA+PROBE: SIGNIFICANT CHANGE UP
RSV RNA SPEC QL NAA+PROBE: SIGNIFICANT CHANGE UP
RV+EV RNA SPEC QL NAA+PROBE: SIGNIFICANT CHANGE UP
RV+EV RNA SPEC QL NAA+PROBE: SIGNIFICANT CHANGE UP
SAO2 % BLDV: 64.8 % — LOW (ref 67–88)
SAO2 % BLDV: 64.8 % — LOW (ref 67–88)
SARS-COV-2 RNA SPEC QL NAA+PROBE: SIGNIFICANT CHANGE UP
SARS-COV-2 RNA SPEC QL NAA+PROBE: SIGNIFICANT CHANGE UP
SODIUM SERPL-SCNC: 130 MMOL/L — LOW (ref 135–145)
SODIUM SERPL-SCNC: 130 MMOL/L — LOW (ref 135–145)
SP GR SPEC: 1.02 — SIGNIFICANT CHANGE UP (ref 1–1.03)
SP GR SPEC: 1.02 — SIGNIFICANT CHANGE UP (ref 1–1.03)
SQUAMOUS # UR AUTO: 8 /HPF — HIGH (ref 0–5)
SQUAMOUS # UR AUTO: 8 /HPF — HIGH (ref 0–5)
UROBILINOGEN FLD QL: 0.2 MG/DL — SIGNIFICANT CHANGE UP (ref 0.2–1)
UROBILINOGEN FLD QL: 0.2 MG/DL — SIGNIFICANT CHANGE UP (ref 0.2–1)
WBC # BLD: 12.51 K/UL — HIGH (ref 3.8–10.5)
WBC # BLD: 12.51 K/UL — HIGH (ref 3.8–10.5)
WBC # FLD AUTO: 12.51 K/UL — HIGH (ref 3.8–10.5)
WBC # FLD AUTO: 12.51 K/UL — HIGH (ref 3.8–10.5)
WBC UR QL: 13 /HPF — HIGH (ref 0–5)
WBC UR QL: 13 /HPF — HIGH (ref 0–5)

## 2023-12-22 PROCEDURE — 99285 EMERGENCY DEPT VISIT HI MDM: CPT | Mod: GC

## 2023-12-22 PROCEDURE — G1004: CPT

## 2023-12-22 PROCEDURE — 74176 CT ABD & PELVIS W/O CONTRAST: CPT | Mod: 26,ME

## 2023-12-22 PROCEDURE — 99223 1ST HOSP IP/OBS HIGH 75: CPT

## 2023-12-22 PROCEDURE — 93010 ELECTROCARDIOGRAM REPORT: CPT

## 2023-12-22 RX ORDER — FENTANYL CITRATE 50 UG/ML
25 INJECTION INTRAVENOUS ONCE
Refills: 0 | Status: DISCONTINUED | OUTPATIENT
Start: 2023-12-22 | End: 2023-12-22

## 2023-12-22 RX ORDER — MORPHINE SULFATE 50 MG/1
2 CAPSULE, EXTENDED RELEASE ORAL ONCE
Refills: 0 | Status: DISCONTINUED | OUTPATIENT
Start: 2023-12-22 | End: 2023-12-22

## 2023-12-22 RX ORDER — SODIUM CHLORIDE 9 MG/ML
500 INJECTION, SOLUTION INTRAVENOUS ONCE
Refills: 0 | Status: COMPLETED | OUTPATIENT
Start: 2023-12-22 | End: 2023-12-22

## 2023-12-22 RX ORDER — POTASSIUM CHLORIDE 20 MEQ
10 PACKET (EA) ORAL
Refills: 0 | Status: COMPLETED | OUTPATIENT
Start: 2023-12-22 | End: 2023-12-22

## 2023-12-22 RX ORDER — SODIUM CHLORIDE 9 MG/ML
1000 INJECTION, SOLUTION INTRAVENOUS
Refills: 0 | Status: DISCONTINUED | OUTPATIENT
Start: 2023-12-22 | End: 2023-12-25

## 2023-12-22 RX ORDER — SODIUM CHLORIDE 9 MG/ML
2000 INJECTION, SOLUTION INTRAVENOUS ONCE
Refills: 0 | Status: COMPLETED | OUTPATIENT
Start: 2023-12-22 | End: 2023-12-22

## 2023-12-22 RX ORDER — ACETAMINOPHEN 500 MG
1000 TABLET ORAL ONCE
Refills: 0 | Status: COMPLETED | OUTPATIENT
Start: 2023-12-22 | End: 2023-12-22

## 2023-12-22 RX ADMIN — SODIUM CHLORIDE 2000 MILLILITER(S): 9 INJECTION, SOLUTION INTRAVENOUS at 14:05

## 2023-12-22 RX ADMIN — Medication 400 MILLIGRAM(S): at 15:12

## 2023-12-22 RX ADMIN — MORPHINE SULFATE 2 MILLIGRAM(S): 50 CAPSULE, EXTENDED RELEASE ORAL at 17:38

## 2023-12-22 RX ADMIN — SODIUM CHLORIDE 100 MILLILITER(S): 9 INJECTION, SOLUTION INTRAVENOUS at 23:49

## 2023-12-22 RX ADMIN — Medication 100 MILLIEQUIVALENT(S): at 18:26

## 2023-12-22 RX ADMIN — FENTANYL CITRATE 25 MICROGRAM(S): 50 INJECTION INTRAVENOUS at 21:07

## 2023-12-22 RX ADMIN — Medication 100 MILLIEQUIVALENT(S): at 16:57

## 2023-12-22 RX ADMIN — Medication 100 MILLIEQUIVALENT(S): at 19:33

## 2023-12-22 RX ADMIN — SODIUM CHLORIDE 500 MILLILITER(S): 9 INJECTION, SOLUTION INTRAVENOUS at 23:00

## 2023-12-22 RX ADMIN — MORPHINE SULFATE 2 MILLIGRAM(S): 50 CAPSULE, EXTENDED RELEASE ORAL at 18:28

## 2023-12-22 NOTE — CONSULT NOTE ADULT - PROBLEM SELECTOR RECOMMENDATION 9
Pt. with MANISHA in the setting of hypotension, diarrhea, Losartan use, ?sepsis and decreased PO intake. On review of [facility], Scr was WNL at 1.22 (eGFR: 65) on 11/22/23. Scr initially during this admission is significantly elevated at 3.93 today. UOP is not documented. UA shows proteinuria, hematuria, and evidence of infection. Upon review of kidney sonogram performed today at [facility] [place] as seen on wife's phone: No hydronephrosis but showing 21.5 cm L renal mass (increased from 10x8 cm on CT abdomen performed on 9/6/23, as seen on [facility]). Labs reviewed. Recommend IV LR at 100 cc/hr for 1L. Check urine sodium, urine potassium, urine chloride and urine uric acid. Check serum uric acid level. Obtain CT abdomen. Avoid nephrotoxins. Dose medications as per eGFR.     If you have any questions, please feel free to contact me  Apolonia Boykin  Nephrology Fellow  [phone] / [employer] Teams(Preferred)  (After 5pm or on weekends please page the on-call fellow) Pt. with MANISHA in the setting of hypotension, diarrhea, Losartan use, ?sepsis and decreased PO intake. On review of [place], Scr was WNL at 1.22 (eGFR: 65) on 11/22/23. Scr initially during this admission is significantly elevated at 3.93 today. UOP is not documented. UA shows proteinuria, hematuria, and evidence of infection. Upon review of kidney sonogram performed today at [facility] [place] as seen on wife's phone: No hydronephrosis but showing 21.5 cm L renal mass (increased from 10x8 cm on CT abdomen performed on 9/6/23, as seen on [facility]). Labs reviewed. Recommend IV LR at 100 cc/hr for 1L. Check urine sodium, urine potassium, urine chloride and urine uric acid. Check serum uric acid level. Obtain CT abdomen. Avoid nephrotoxins. Dose medications as per eGFR.     If you have any questions, please feel free to contact me  Apolonia Boykin  Nephrology Fellow  [phone] / [employer] Teams(Preferred)  (After 5pm or on weekends please page the on-call fellow) Pt. with MANISHA in the setting of hypotension, diarrhea, Losartan use, ?sepsis and decreased PO intake. On review of [place], Scr was WNL at 1.22 (eGFR: 65) on 11/22/23. Scr initially during this admission is significantly elevated at 3.93 today. UOP is not documented. UA shows proteinuria, hematuria, and evidence of infection. Upon review of kidney sonogram performed today at [facility] [place] as seen on wife's phone: No hydronephrosis but showing 21.5 cm L renal mass (increased from 10x8 cm on CT abdomen performed on 9/6/23, as seen on [facility]). Labs reviewed. Pt. with likely hemodynamic mediated MANISHA in the settin gof hypotension/diarrhea, and Losartan use. Recommend IV LR at 100 cc/hr for 1L. Hold Losartan. Check urine sodium, urine potassium, urine chloride and urine uric acid. Check serum uric acid level. Obtain CT abdomen. Avoid nephrotoxins. Dose medications as per eGFR.     If you have any questions, please feel free to contact me  Apolonia Boykin  Nephrology Fellow  [phone] / [employer] Teams(Preferred)  (After 5pm or on weekends please page the on-call fellow) Pt. with MANISHA in the setting of hypotension, diarrhea, Losartan use, ?sepsis and decreased PO intake. On review of [place], Scr was WNL at 1.22 (eGFR: 65) on 11/22/23. Scr initially during this admission is significantly elevated at 3.93 today. UOP is not documented. UA shows proteinuria, hematuria, and evidence of infection. Upon review of [place] [facility] [place]'s [place]: No hydronephrosis but showing 21.5 cm L renal mass (increased from 10x8 cm on CT abdomen performed on 9/6/23, as seen on [facility]). Labs reviewed. Pt. with likely hemodynamic mediated MANISHA in the settin gof hypotension/diarrhea, and Losartan use. Recommend IV LR at 100 cc/hr for 1L. Hold Losartan. Check urine sodium, urine potassium, urine chloride and urine uric acid. Check serum uric acid level. Obtain CT abdomen. Avoid nephrotoxins. Dose medications as per eGFR.     If you have any questions, please feel free to contact me  Apolonia Boykin  Nephrology Fellow  [phone] / [employer] Teams(Preferred)  (After 5pm or on weekends please page the on-call fellow) Pt. with MANISHA in the setting of hypotension, diarrhea, Losartan use, ?sepsis and decreased PO intake. On review of [place], Scr was WNL at 1.22 (eGFR: 65) on 11/22/23. Scr initially during this admission is significantly elevated at 3.93 today. UOP is not documented. UA shows proteinuria, hematuria, and evidence of infection. Upon review of kidney sonogram performed today at [facility] [place] as seen on wife's phone: No hydronephrosis but showing 21.5 cm L renal mass (increased from 10x8 cm on CT abdomen performed on 9/6/23, as seen on [facility]). Labs reviewed. Pt. with likely hemodynamic mediated MANISHA in the setting of hypotension/diarrhea, and Losartan use. Recommend IV LR at 100 cc/hr for 1L. Hold Losartan. Check urine sodium, urine potassium, urine chloride and urine uric acid. Check serum uric acid level. Obtain CT abdomen. Avoid nephrotoxins. Dose medications as per eGFR.     If you have any questions, please feel free to contact me  Apolonia Boykin  Nephrology Fellow  [phone] / [employer] Teams(Preferred)  (After 5pm or on weekends please page the on-call fellow) Pt. with MANISHA in the setting of hypotension, diarrhea, Losartan use, ?sepsis and decreased PO intake. On review of [place], Scr was WNL at 1.22 (eGFR: 65) on 11/22/23. Scr initially during this admission is significantly elevated at 3.93 today. UOP is not documented. UA shows proteinuria, hematuria, and evidence of infection. Upon review of kidney sonogram performed today at [facility] [place] as seen on wife's phone: No hydronephrosis but showing 21.5 cm L renal mass (increased from 10x8 cm on CT abdomen performed on 9/6/23, as seen on [facility]). Labs reviewed. Pt. with likely hemodynamic mediated MANISHA in the setting of hypotension/diarrhea, and Losartan use. Recommend IV LR at 100 cc/hr for 1L. Hold Losartan. Check urine sodium, urine potassium, urine chloride and urine uric acid. Check serum uric acid level. Obtain CT abdomen. Avoid nephrotoxins. Dose medications as per eGFR.     If you have any questions, please feel free to contact me  Apolonia Boykin  Nephrology Fellow  [phone] / [employer] Teams(Preferred)  (After 5pm or on weekends please page the on-call fellow)

## 2023-12-22 NOTE — ED PROVIDER NOTE - ATTENDING CONTRIBUTION TO CARE
68M hx urothelial cancer (on Enfortumab Vedotin and Pembrolizumab last treatment 12/13, s/p L ureteral stent s/p exchange, mets to lung), R kidney stones (s/p R ureteroscopy w/ stone removal 8/2023), BPH, parkinson disease (s/p DBS), hypothyroidism (thyroid cancer s/p partial thyroidectomy), presented for MANISHA on outpatient labs i/s/o diarrhea and poor PO intake. Patient took an antidiarrheal medication has not had any diarrhea for the last 3 days. He appears very dry in exam, normal mental status.  Will obtain CT to rule out any obstructive process that may be contributing to MANISHA. He feels generally weak, labs notable for MANISHA, will give fluid resuscitation and admit for further monitoring and treatment.

## 2023-12-22 NOTE — CONSULT NOTE ADULT - SUBJECTIVE AND OBJECTIVE BOX
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 Nephrology was conulted for MANISHA. On review of [place]/[facility], Scr was WNL at 1.22 (eGFR: 65) on 11/22/23. Scr initially during this admission was significantly elevated at 3.93 today.    Pt. was seen and evaluated with family present in the ER earlier today. Per wife, pt. with diarrhea for ~5 days which resolved on Wednesday (12/20). Pt. also with poor PO appetite, BL LE edema, and generalized weakness/fatigue for the past few months. No recent NSAID use or medication changes. Pt. currently takes PO Losartan. No blood in urine or foamy urine. Pt. reports urinary urgency/frequency at nighttime with large volume void. Denies any headaches, fevers/chills, chest pain, SOB, and abdominal pain.    PAST HISTORY  --------------------------------------------------------------------------------  PAST MEDICAL & SURGICAL HISTORY:  Parkinson disease  Hypertension  Hypothyroid  Anxiety  Rectal mass  Osteoarthritis  Edema leg  BALJINDER (obstructive sleep apnea)  based on stop bang score of 6  Renal cell carcinoma  Urothelial cancer  Bell's palsy  Hypomagnesemia    History of hip replacement  right  H/O resection of rectum  large mass, was non-cancerous  H/O ileostomy  reversed 12/2014  H/O laminectomy  lumbar x2  S/P hernia repair  incisional  History of reversal of ileostomy  S/P deep brain stimulator placement    FAMILY HISTORY:    PAST SOCIAL HISTORY:    ALLERGIES & MEDICATIONS  --------------------------------------------------------------------------------  Allergies    No Known Allergies    Intolerances      Standing Inpatient Medications  lactated ringers. 1000 milliLiter(s) IV Continuous <Continuous>  potassium chloride  10 mEq/100 mL IVPB 10 milliEquivalent(s) IV Intermittent every 1 hour    PRN Inpatient Medications      REVIEW OF SYSTEMS  --------------------------------------------------------------------------------  Gen: +Generalized weakness/fatigue  Skin: No rashes  Head/Eyes/Ears: No headache  Respiratory: No dyspnea  CV: No chest pain  GI: +Diarrhea and decreased PO intake  : No dysuria, hematuria  MSK: +BL LE edema  Heme: No easy bruising or bleeding    All other systems were reviewed and are negative, except as noted.    VITALS/PHYSICAL EXAM  --------------------------------------------------------------------------------  T(C): 36.4 (12-22-23 @ 16:37), Max: 36.4 (12-22-23 @ 16:37)  HR: 63 (12-22-23 @ 16:37) (63 - 71)  BP: 95/49 (12-22-23 @ 16:37) (88/62 - 95/49)  RR: 17 (12-22-23 @ 16:37) (16 - 17)  SpO2: 96% (12-22-23 @ 16:37) (96% - 100%)  Wt(kg): --    Physical Exam:  Gen: Chronically ill appearing male, tremulous  HEENT: Dry mucous membranes  Pulm: CTA B/L  CV: S1S2  Abd: Soft, +BS   Ext: +BL LE edema  Neuro: Awake and alert  Skin: Warm and dry  Vascular access: Peripheral IV line    LABS/STUDIES  --------------------------------------------------------------------------------              7.7    12.51 >-----------<  326      [12-22-23 @ 14:00]              24.2     130  |  96  |  27  ----------------------------<  121      [12-22-23 @ 14:00]  3.1   |  19  |  3.93        Ca     7.2     [12-22-23 @ 14:00]      Mg     1.60     [12-22-23 @ 14:00]      Phos  3.1     [12-22-23 @ 14:00]    TPro  6.4  /  Alb  2.7  /  TBili  0.8  /  DBili  x   /  AST  26  /  ALT  <5  /  AlkPhos  79  [12-22-23 @ 14:00]    Creatinine Trend:  SCr 3.93 [12-22 @ 14:00]    Urinalysis - [12-22-23 @ 16:30]      Color Yellow / Appearance Cloudy / SG 1.016 / pH 5.5      Gluc Negative / Ketone Trace  / Bili Negative / Urobili 0.2       Blood Large / Protein 100 / Leuk Est Small / Nitrite Negative      RBC  / WBC  / Hyaline  / Gran  / Sq Epi  / Non Sq Epi  / Bacteria  [facility] DIVISION OF KIDNEY DISEASES AND HYPERTENSION -- [phone]  -- INITIAL CONSULT NOTE  --------------------------------------------------------------------------------  HPI: 67 yo M with h/o 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 Nephrology was conulted for MANISHA. On review of [place], Scr was WNL at 1.22 (eGFR: 65) on 11/22/23. Scr initially during this admission was significantly elevated at 3.93 today.    Pt. was seen and evaluated with family present in the ER earlier today. Per wife, pt. with diarrhea for ~5 days which resolved on Wednesday (12/20). Pt. also with poor PO appetite, BL LE edema, and generalized weakness/fatigue for the past few months. No recent NSAID use or medication changes. Pt. currently takes PO Losartan. No blood in urine or foamy urine. Pt. reports urinary urgency/frequency at nighttime with large volume void. Denies any headaches, fevers/chills, chest pain, SOB, and abdominal pain.    PAST HISTORY  --------------------------------------------------------------------------------  PAST MEDICAL & SURGICAL HISTORY:  Parkinson disease  Hypertension  Hypothyroid  Anxiety  Rectal mass  Osteoarthritis  Edema leg  BALJINDER (obstructive sleep apnea)  based on stop bang score of 6  Renal cell carcinoma  Urothelial cancer  Bell's palsy  Hypomagnesemia    History of hip replacement  right  H/O resection of rectum  large mass, was non-cancerous  H/O ileostomy  reversed 12/2014  H/O laminectomy  lumbar x2  S/P hernia repair  incisional  History of reversal of ileostomy  S/P deep brain stimulator placement    FAMILY HISTORY:    PAST SOCIAL HISTORY:    ALLERGIES & MEDICATIONS  --------------------------------------------------------------------------------  Allergies    No Known Allergies    Intolerances      Standing Inpatient Medications  lactated ringers. 1000 milliLiter(s) IV Continuous <Continuous>  potassium chloride  10 mEq/100 mL IVPB 10 milliEquivalent(s) IV Intermittent every 1 hour    PRN Inpatient Medications      REVIEW OF SYSTEMS  --------------------------------------------------------------------------------  Gen: +Generalized weakness/fatigue  Skin: No rashes  Head/Eyes/Ears: No headache  Respiratory: No dyspnea  CV: No chest pain  GI: +Diarrhea and decreased PO intake  : No dysuria, hematuria  MSK: +BL LE edema  Heme: No easy bruising or bleeding    All other systems were reviewed and are negative, except as noted.    VITALS/PHYSICAL EXAM  --------------------------------------------------------------------------------  T(C): 36.4 (12-22-23 @ 16:37), Max: 36.4 (12-22-23 @ 16:37)  HR: 63 (12-22-23 @ 16:37) (63 - 71)  BP: 95/49 (12-22-23 @ 16:37) (88/62 - 95/49)  RR: 17 (12-22-23 @ 16:37) (16 - 17)  SpO2: 96% (12-22-23 @ 16:37) (96% - 100%)  Wt(kg): --    Physical Exam:  Gen: Chronically ill appearing male, tremulous  HEENT: Dry mucous membranes  Pulm: CTA B/L  CV: S1S2  Abd: Soft, +BS   Ext: +BL LE edema  Neuro: Awake and alert  Skin: Warm and dry  Vascular access: Peripheral IV line    LABS/STUDIES  --------------------------------------------------------------------------------              7.7    12.51 >-----------<  326      [12-22-23 @ 14:00]              24.2     130  |  96  |  27  ----------------------------<  121      [12-22-23 @ 14:00]  3.1   |  19  |  3.93        Ca     7.2     [12-22-23 @ 14:00]      Mg     1.60     [12-22-23 @ 14:00]      Phos  3.1     [12-22-23 @ 14:00]    TPro  6.4  /  Alb  2.7  /  TBili  0.8  /  DBili  x   /  AST  26  /  ALT  <5  /  AlkPhos  79  [12-22-23 @ 14:00]    Creatinine Trend:  SCr 3.93 [12-22 @ 14:00]    Urinalysis - [12-22-23 @ 16:30]      Color Yellow / Appearance Cloudy / SG 1.016 / pH 5.5      Gluc Negative / Ketone Trace  / Bili Negative / Urobili 0.2       Blood Large / Protein 100 / Leuk Est Small / Nitrite Negative      RBC  / WBC  / Hyaline  / Gran  / Sq Epi  / Non Sq Epi  / Bacteria  [facility] DIVISION OF KIDNEY DISEASES AND HYPERTENSION -- [phone]  -- INITIAL CONSULT NOTE  --------------------------------------------------------------------------------  HPI: 69 yo M with h/o 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 Nephrology was conulted for MANISHA. On review of [place], Scr was WNL at 1.22 (eGFR: 65) on 11/22/23. Scr initially during this admission was significantly elevated at 3.93 today.    Pt. was seen and evaluated with family present in the ER earlier today. Per wife, pt. with diarrhea for ~5 days which resolved on Wednesday (12/20). Pt. also with poor PO appetite, BL LE edema, and generalized weakness/fatigue for the past few months. No recent NSAID use or medication changes. Pt. currently takes PO Losartan. No blood in urine or foamy urine. Pt. reports urinary urgency/frequency at nighttime with large volume void. Denies any headaches, fevers/chills, chest pain, SOB, and abdominal pain.    PAST HISTORY  --------------------------------------------------------------------------------  PAST MEDICAL & SURGICAL HISTORY:  Parkinson disease  Hypertension  Hypothyroid  Anxiety  Rectal mass  Osteoarthritis  Edema leg  BALJINDER (obstructive sleep apnea)  based on stop bang score of 6  Renal cell carcinoma  Urothelial cancer  Bell's palsy  Hypomagnesemia    History of hip replacement  right  H/O resection of rectum  large mass, was non-cancerous  H/O ileostomy  reversed 12/2014  H/O laminectomy  lumbar x2  S/P hernia repair  incisional  History of reversal of ileostomy  S/P deep brain stimulator placement    FAMILY HISTORY:    PAST SOCIAL HISTORY: denied smoking or EtOH    ALLERGIES & MEDICATIONS  --------------------------------------------------------------------------------  Allergies    No Known Allergies    Intolerances      Standing Inpatient Medications  lactated ringers. 1000 milliLiter(s) IV Continuous <Continuous>  potassium chloride  10 mEq/100 mL IVPB 10 milliEquivalent(s) IV Intermittent every 1 hour    PRN Inpatient Medications      REVIEW OF SYSTEMS  --------------------------------------------------------------------------------  Gen: +Generalized weakness/fatigue  Skin: No rashes  Head/Eyes/Ears: No headache  Respiratory: No dyspnea  CV: No chest pain  GI: +Diarrhea and decreased PO intake  : No dysuria, hematuria  MSK: +BL LE edema  Heme: No easy bruising or bleeding    All other systems were reviewed and are negative, except as noted.    VITALS/PHYSICAL EXAM  --------------------------------------------------------------------------------  T(C): 36.4 (12-22-23 @ 16:37), Max: 36.4 (12-22-23 @ 16:37)  HR: 63 (12-22-23 @ 16:37) (63 - 71)  BP: 95/49 (12-22-23 @ 16:37) (88/62 - 95/49)  RR: 17 (12-22-23 @ 16:37) (16 - 17)  SpO2: 96% (12-22-23 @ 16:37) (96% - 100%)  Wt(kg): --    Physical Exam:  Gen: Chronically ill appearing male, tremulous  HEENT: Dry mucous membranes  Pulm: CTA B/L  CV: S1S2  Abd: Soft, +BS   Ext: +BL LE edema  Neuro: Awake and alert  Skin: Warm and dry  Vascular access: Peripheral IV line    LABS/STUDIES  --------------------------------------------------------------------------------              7.7    12.51 >-----------<  326      [12-22-23 @ 14:00]              24.2     130  |  96  |  27  ----------------------------<  121      [12-22-23 @ 14:00]  3.1   |  19  |  3.93        Ca     7.2     [12-22-23 @ 14:00]      Mg     1.60     [12-22-23 @ 14:00]      Phos  3.1     [12-22-23 @ 14:00]    TPro  6.4  /  Alb  2.7  /  TBili  0.8  /  DBili  x   /  AST  26  /  ALT  <5  /  AlkPhos  79  [12-22-23 @ 14:00]    Creatinine Trend:  SCr 3.93 [12-22 @ 14:00]    Urinalysis - [12-22-23 @ 16:30]      Color Yellow / Appearance Cloudy / SG 1.016 / pH 5.5      Gluc Negative / Ketone Trace  / Bili Negative / Urobili 0.2       Blood Large / Protein 100 / Leuk Est Small / Nitrite Negative      RBC  / WBC  / Hyaline  / Gran  / Sq Epi  / Non Sq Epi  / Bacteria  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phrology was conulted for MANISHA. On review of [place], Scr was WNL at 1.22 (eGFR: 65) on 11/22/23. Scr initially during this admission was significantly elevated at 3.93 today.    Pt. was seen and evaluated with family present in the ER earlier today. Per wife, pt. with diarrhea for ~5 days which resolved on Wednesday (12/20). Pt. also with poor PO appetite, BL LE edema, and generalized weakness/fatigue for the past few months. No recent NSAID use or medication changes. Pt. currently takes PO Losartan. No blood in urine or foamy urine. Pt. reports urinary urgency/frequency at nighttime with large volume void. Denies any headaches, fevers/chills, chest pain, SOB, and abdominal pain.    PAST HISTORY  --------------------------------------------------------------------------------  PAST MEDICAL & SURGICAL HISTORY:  Parkinson disease  Hypertension  Hypothyroid  Anxiety  Rectal mass  Osteoarthritis  Edema leg  BALJINDER (obstructive sleep apnea)  based on stop bang score of 6  Renal cell carcinoma  Urothelial cancer  Bell's palsy  Hypomagnesemia    History of hip replacement  right  H/O resection of rectum  large mass, was non-cancerous  H/O ileostomy  reversed 12/2014  H/O laminectomy  lumbar x2  S/P hernia repair  incisional  History of reversal of ileostomy  S/P deep brain stimulator placement    FAMILY HISTORY:    PAST SOCIAL HISTORY: denied smoking or EtOH    ALLERGIES & MEDICATIONS  --------------------------------------------------------------------------------  Allergies    No Known Allergies    Intolerances      Standing Inpatient Medications  lactated ringers. 1000 milliLiter(s) IV Continuous <Continuous>  potassium chloride  10 mEq/100 mL IVPB 10 milliEquivalent(s) IV Intermittent every 1 hour    PRN Inpatient Medications      REVIEW OF SYSTEMS  --------------------------------------------------------------------------------  Gen: +Generalized weakness/fatigue  Skin: No rashes  Head/Eyes/Ears: No headache  Respiratory: No dyspnea  CV: No chest pain  GI: +Diarrhea and decreased PO intake  : No dysuria, hematuria  MSK: +BL LE edema  Heme: No easy bruising or bleeding    All other systems were reviewed and are negative, except as noted.    VITALS/PHYSICAL EXAM  --------------------------------------------------------------------------------  T(C): 36.4 (12-22-23 @ 16:37), Max: 36.4 (12-22-23 @ 16:37)  HR: 63 (12-22-23 @ 16:37) (63 - 71)  BP: 95/49 (12-22-23 @ 16:37) (88/62 - 95/49)  RR: 17 (12-22-23 @ 16:37) (16 - 17)  SpO2: 96% (12-22-23 @ 16:37) (96% - 100%)  Wt(kg): --    Physical Exam:  Gen: Chronically ill appearing male, tremulous  HEENT: Dry mucous membranes  Pulm: CTA B/L  CV: S1S2  Abd: Soft, +BS   Ext: +BL LE edema  Neuro: Awake and alert  Skin: Warm and dry  Vascular access: Peripheral IV line    LABS/STUDIES  --------------------------------------------------------------------------------              7.7    12.51 >-----------<  326      [12-22-23 @ 14:00]              24.2     130  |  96  |  27  ----------------------------<  121      [12-22-23 @ 14:00]  3.1   |  19  |  3.93        Ca     7.2     [12-22-23 @ 14:00]      Mg     1.60     [12-22-23 @ 14:00]      Phos  3.1     [12-22-23 @ 14:00]    TPro  6.4  /  Alb  2.7  /  TBili  0.8  /  DBili  x   /  AST  26  /  ALT  <5  /  AlkPhos  79  [12-22-23 @ 14:00]    Creatinine Trend:  SCr 3.93 [12-22 @ 14:00]    Urinalysis - [12-22-23 @ 16:30]      Color Yellow / Appearance Cloudy / SG 1.016 / pH 5.5      Gluc Negative / Ketone Trace  / Bili Negative / Urobili 0.2       Blood Large / Protein 100 / Leuk Est Small / Nitrite Negative      RBC  / WBC  / Hyaline  / Gran  / Sq Epi  / Non Sq Epi  / Bacteria

## 2023-12-22 NOTE — CONSULT NOTE ADULT - PROBLEM SELECTOR RECOMMENDATION 9
-No acute urologic intervention at this time.  Hydro secondary to large tumor, in nonfunctioning kidney, likely not cause of MANISHA.   -If Nephrology feels decompression would help patient, then IR can be consulted for PCN.  -Supportive care.  -Discussed with Dr. Hoenig

## 2023-12-22 NOTE — ED PROVIDER NOTE - PROGRESS NOTE DETAILS
Aníbal, PGY-3, EM: patient w/ worsening renal function. discussed w/ pt and wife that he will need admission. paged nephrology. pending CTAP. Aníbal, PGY-3, EM: nephrology consulted, recommending mIVF @ 100, will evaluate the patient. Aníbal, PGY-3, EM: s/w urology, they will evaluate the patient. Discussed results of CT w/ pt's wife, showing mass obstruction L ureter at level of bladder, not seen on prior imaging. Patient received as a sign out.  Pending urology recommendations at this time.  Patient reports he has been having generalized body pains since he started new chemotherapy.  Patient had received Tylenol and morphine since he has been here.  States that did not provide relief.  Patient is noted to have soft blood pressures that is likely from dehydration from diarrhea over past few days.  Patient has only received 1 L of fluids.  Plan to give 25 mg of fentanyl at this time.  Marivel Munoz, DO pt is still receiving the second liter of fluids. Fluids are given slowly over the visit of few hours. Plan to give another bolus of 500cc and then start maintenance fluids. D/w Dr. Agustin- agrees with admission  Spoke with urology - no acute interventions at this time. Received a call from the hospitalist - given the patient has soft BP, wants the admission to be reversed. Rpt BP is MAP is 90/50s. Pt still has only received about 1700cc of fluids since he has been here. Plan to repeat bp after the current bolus is completed  plus another 500cc of fluids.

## 2023-12-22 NOTE — ED PROVIDER NOTE - CLINICAL SUMMARY MEDICAL DECISION MAKING FREE TEXT BOX
69 y/o Male with PMhx of PD, hypothyroidism, metastatic urothelial CA on chemotherapy (last treatment 12/13), partial thyroidectomy, presenting with weakness and dehydration i/s/o [place] [facility] [place]HA and hypoaklemia. Diarrhea started around December 16, was having multiple episodes of watery brown diarrhea, was given antidiarrheal medication and has not had a bowel movement since 12/20. Patient saw oncologist, had outpatient labs showing hypokalemia, was given oral potassium pills to be taking at home, repeat labs showed MANISHA and continued hypokalemia was referred to the ED.  Patient appears very dry on exam, some mild left lower quadrant tenderness, abdomen does appear distended.  No rebound or guarding.  Noted to be hypotensive, afebrile, suspect secondary to severe dehydration.  Pending labs, CT ab pelvis without contrast (2/2 MANISHA). 69 y/o Male with PMhx of PD, hypothyroidism, metastatic urothelial CA on chemotherapy (last treatment 12/13), partial thyroidectomy, presenting with weakness and dehydration i/s/o diarrhea w/ [place] [facility] [place]ISHA and hypoaklemia. Diarrhea started around December 16, was having multiple episodes of watery brown diarrhea, was given antidiarrheal medication and has not had a bowel movement since 12/20. Patient saw oncologist, had outpatient labs showing hypokalemia, was given oral potassium pills to be taking at home, repeat labs showed MANISHA and continued hypokalemia was referred to the ED.  Patient appears very dry on exam, some mild left lower quadrant tenderness, abdomen does appear distended.  No rebound or guarding.  Noted to be hypotensive, afebrile, suspect secondary to severe dehydration.  Pending labs, CT ab pelvis without contrast (2/2 MANISHA).

## 2023-12-22 NOTE — CONSULT NOTE ADULT - ATTENDING COMMENTS
Patient with extensive urothelial carcinoma, Parkinson's who presented with weakness, fatigue, decreased oral intake and several days of diarrhea.  Labs showed acute deterioration in renal function.  CT showed the extensive renal masses and pelvic soft tissue mass density.    1. MANISHA - contributing factors include hypotension, diarrhea, decreased oral intake and use of ARB (hemodynamic compromise to renal hemodynamics). Would continue with intravenous fluids, holding of ARB and serial labs.  Most recent creatinine is 2.76, improved from previous.  Consistent with volume deficits is markedly elevated uric acid at 12.3, though tissue source remains as partial explanation.  Would review BMP every 12 hours so as to appropriately guide treatment.  2. Hyponatremia - improving on intravenous fluids.  Further recommendations for treatment and evaluation per clinical course.  3. Hypokalemia - replete per sequential labs.  4. Hypocalcemia - add ionized calcium to 12/24 labs.    5. Anemia - monitor with transfusion per Heme and Medical team.  6. Metabolic acidosis - monitor with labs.  Would also review lactate levels if persists.  This may require change of intravenous fluids to NS.  Can consider adding oral bicarbonate per patient's oral tolerance.  Monitor urine output and labs.  Currently no indication for PCNT but may need to be reconsidered per clinical course.

## 2023-12-22 NOTE — ED ADULT TRIAGE NOTE - CHIEF COMPLAINT QUOTE
Pt has rt renal cell carcinoma on chemo reports had blood work done at [facility] this morning and was found to have abnormal labs including elevated creatinine of 4, and abnormal potassium. Sent here for hydration and imaging. Pt has been having multiple episodes of diarrhea since saturday. Pt hypotensive in triage to systolic 80s, mentating well. Charge RN aware. Hx: thyroid cancer Pt has rt renal cell carcinoma on chemo reports [place] [facility] [place] and was found to have abnormal labs including elevated creatinine of 4, and abnormal potassium. Sent here for hydration and imaging. Pt has been having multiple episodes of diarrhea since saturday. Pt hypotensive in triage to systolic 80s, mentating well. Charge RN aware. Hx: thyroid cancer Pt has rt renal cell carcinoma on chemo reports [place] [facility] [place] and was found to have abnormal labs including elevated creatinine of 4, and abnormal potassium. Sent here for hydration and imaging. Pt has been having multiple episodes of diarrhea since saturday. Pt hypotensive in triage to systolic 80s, mentating well. Charge RN aware. Hx: thyroid cancer, Parkinson's Pt has rt renal cell carcinoma on chemo reports had [place] work done [place] [facility] [place] and was found to have abnormal labs including elevated creatinine of 4, and abnormal potassium. Sent here for hydration and imaging. Pt has been having multiple episodes of diarrhea since saturday. Pt hypotensive in triage to systolic 80s, mentating well. Charge RN aware. Hx: thyroid cancer, Parkinson's

## 2023-12-22 NOTE — CONSULT NOTE ADULT - ASSESSMENT
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

## 2023-12-22 NOTE — ED ADULT NURSE REASSESSMENT NOTE - NS ED NURSE REASSESS COMMENT FT1
report received from AM RN. pt remains at baseline mental status A&OX3, RR even unlabored completing full sentences. pt noted to have chest wall port to the L side, flushes well. ok to use per MD. pt complaining of increasing pain, MD made aware and medicated per order set. MD aware of pt VS. fluids running as ordered. stretcher lowest position siderails up safety measures in place. report received from AM RN. pt remains at baseline mental status A&OX3, RR even unlabored completing full sentences. pt noted to have chest wall port to the L side, flushes well. per MD no cxray to confirm placement is necessary, as port was used in the AM. ok to use per MD. pt complaining of increasing pain, MD made aware and medicated per order set. MD aware of pt VS. fluids running as ordered. stretcher lowest position siderails up safety measures in place. report received from AM RN. pt remains at baseline mental status A&OX3, RR even unlabored completing full sentences. pt noted to have chest wall port to the L side, flushes well. per MD no cxray to confirm placement is necessary, as port was used in the AM. ok to use per MD. pt complaining of increasing pain, MD made aware and medicated per order set. MD aware of pt VS. fluids running as ordered. pt using urinal at bedside, 250cc output noted in urinal stretcher lowest position siderails up safety measures in place.

## 2023-12-22 NOTE — ED PROVIDER NOTE - PHYSICAL EXAMINATION
GEN - NAD; A+O x3   HEAD - NC/AT   EYES- PERRL, EOMI  ENT: Airway patent, very dry mucous membranes   NECK: Neck supple  ABDOMEN - distended, TTP LLQ, no r/g   NEUROLOGIC - alert, speech clear, moving all 4 ext spont   PSYCH -nl mood/affect, nl insight.

## 2023-12-22 NOTE — ED ADULT TRIAGE NOTE - PAIN: PRESENCE, MLM
Thank you! Thank you for allowing me to care for you in the emergency department. It is my goal to provide you with excellent care. If you have not received excellent quality care, please ask to speak to the nurse manager. Please fill out the survey that will come to you by mail or email since we listen to your feedback! Below you will find a list of your tests from today's visit. Should you have any questions, please do not hesitate to call the emergency department. Labs  Recent Results (from the past 12 hour(s))   POC HCG,URINE    Collection Time: 03/31/23 10:38 AM   Result Value Ref Range    Pregnancy test,urine (POC) Negative Negative     CBC WITH AUTOMATED DIFF    Collection Time: 03/31/23 10:41 AM   Result Value Ref Range    WBC 9.6 3.6 - 11.0 K/uL    RBC 4.65 3.80 - 5.20 M/uL    HGB 13.8 11.5 - 16.0 g/dL    HCT 42.5 35.0 - 47.0 %    MCV 91.4 80.0 - 99.0 FL    MCH 29.7 26.0 - 34.0 PG    MCHC 32.5 30.0 - 36.5 g/dL    RDW 13.1 11.5 - 14.5 %    PLATELET 353 324 - 108 K/uL    MPV 10.2 8.9 - 12.9 FL    NRBC 0.0 0.0  WBC    ABSOLUTE NRBC 0.00 0.00 - 0.01 K/uL    NEUTROPHILS 65 32 - 75 %    LYMPHOCYTES 21 12 - 49 %    MONOCYTES 8 5 - 13 %    EOSINOPHILS 4 0 - 7 %    BASOPHILS 1 0 - 1 %    IMMATURE GRANULOCYTES 1 (H) 0 - 0.5 %    ABS. NEUTROPHILS 6.3 1.8 - 8.0 K/UL    ABS. LYMPHOCYTES 2.0 0.8 - 3.5 K/UL    ABS. MONOCYTES 0.7 0.0 - 1.0 K/UL    ABS. EOSINOPHILS 0.4 0.0 - 0.4 K/UL    ABS. BASOPHILS 0.1 0.0 - 0.1 K/UL    ABS. IMM.  GRANS. 0.1 (H) 0.00 - 0.04 K/UL    DF AUTOMATED     METABOLIC PANEL, COMPREHENSIVE    Collection Time: 03/31/23 10:41 AM   Result Value Ref Range    Sodium 140 136 - 145 mmol/L    Potassium 4.2 3.5 - 5.1 mmol/L    Chloride 111 (H) 97 - 108 mmol/L    CO2 28 21 - 32 mmol/L    Anion gap 1 (L) 5 - 15 mmol/L    Glucose 96 65 - 100 mg/dL    BUN 13 6 - 20 mg/dL    Creatinine 0.73 0.55 - 1.02 mg/dL    BUN/Creatinine ratio 18 12 - 20      eGFR >60 >60 ml/min/1.73m2    Calcium 9.0 8.5 - 10.1 mg/dL    Bilirubin, total 0.9 0.2 - 1.0 mg/dL    AST (SGOT) 7 (L) 15 - 37 U/L    ALT (SGPT) 11 (L) 12 - 78 U/L    Alk. phosphatase 48 45 - 117 U/L    Protein, total 6.5 6.4 - 8.2 g/dL    Albumin 3.7 3.5 - 5.0 g/dL    Globulin 2.8 2.0 - 4.0 g/dL    A-G Ratio 1.3 1.1 - 2.2     URINALYSIS W/ REFLEX CULTURE    Collection Time: 03/31/23 10:41 AM    Specimen: Urine   Result Value Ref Range    Color Yellow/Straw      Appearance Turbid (A) Clear      Specific gravity 1.016 1.003 - 1.030      pH (UA) 7.0 5.0 - 8.0      Protein Negative Negative mg/dL    Glucose Negative Negative mg/dL    Ketone Negative Negative mg/dL    Bilirubin Negative Negative      Blood Negative Negative      Urobilinogen 0.1 0.1 - 1.0 EU/dL    Nitrites Negative Negative      Leukocyte Esterase Negative Negative      WBC 0-4 0 - 4 /hpf    RBC 0-5 0 - 5 /hpf    Epithelial cells Moderate (A) Few /lpf    Bacteria Negative Negative /hpf    UA:UC IF INDICATED Culture not indicated by UA result Culture not indicated by UA result      Mucus Trace (A) Negative /lpf       Radiologic Studies  US TRANSVAGINAL   Final Result   Trace free fluid in the endometrium outlines isoechoic nodules. These are avascular, were not visible on 2/28/2023, and may represent merely   debris/blood. Follow-up transvaginal ultrasound is recommended in 6-8 weeks. CT Results  (Last 48 hours)      None          CXR Results  (Last 48 hours)      None          ------------------------------------------------------------------------------------------------------------  The exam and treatment you received in the Emergency Department were for an urgent problem and are not intended as complete care. It is important that you follow-up with a doctor, nurse practitioner, or physician assistant to:  (1) confirm your diagnosis,  (2) re-evaluation of changes in your illness and treatment, and  (3) for ongoing care.  Please take your discharge instructions with you when you go to your follow-up appointment. If you have any problem arranging a follow-up appointment, contact the Emergency Department. If your symptoms become worse or you do not improve as expected and you are unable to reach your health care provider, please return to the Emergency Department. We are available 24 hours a day. If a prescription has been provided, please have it filled as soon as possible to prevent a delay in treatment. If you have any questions or reservations about taking the medication due to side effects or interactions with other medications, please call your primary care provider or contact the ER. denies pain/discomfort (Rating = 0)

## 2023-12-22 NOTE — ED ADULT NURSE NOTE - CHIEF COMPLAINT QUOTE
Pt has rt renal cell carcinoma on chemo reports had blood work done at [facility] [place] this morning and was found to have abnormal labs including elevated creatinine of 4, and abnormal potassium. Sent here for hydration and imaging. Pt has been having multiple episodes of diarrhea since saturday. Pt hypotensive in triage to systolic 80s, mentating well. Charge RN aware. Hx: thyroid cancer, Parkinson's Pt has rt renal cell carcinoma on chemo reports [place] [facility] [place] was found to have abnormal labs including elevated creatinine of 4, and abnormal potassium. Sent here for hydration and imaging. Pt has been having multiple episodes of diarrhea since saturday. Pt hypotensive in triage to systolic 80s, mentating well. Charge RN aware. Hx: thyroid cancer, Parkinson's

## 2023-12-22 NOTE — ED PROVIDER NOTE - CARE PLAN
1 Principal Discharge DX:	MANISHA (acute kidney injury)  Secondary Diagnosis:	Hydronephrosis  Secondary Diagnosis:	Hypotension

## 2023-12-22 NOTE — CONSULT NOTE ADULT - SUBJECTIVE AND OBJECTIVE BOX
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  Over the past 2 weeks he has become more weak and fatigued with c/o diarrhea and poor po intake. Ct performed showing Interval progression of metastatic disease to the chest since 2023. Large heterogeneous mass replacing the left kidney, consistent with known  neoplasm. Left hydroureteronephrosis to the level of a mass in the urinary bladder. Likely additional disease within the deep pelvis. A   heterogeneous solid mass in the right kidney.  He denies abdominal pain or flank pain.  Denies dysuria, hematuria, increase urgency or frequency, and denies retention.  Denies fevers.  He has required ureteral stents and Nephrostomy tubes over the past year, most recently had a stent , removed .    In the ED, he is afebrile, with BP 88/47-95/49.  WBC 12.5, Hgb 7.7m Hct 24.2, creatinine 3.9, Na 130, K 3.1, lactate 1.7, UA with blood, 11RBC, negative bacteria 13 WBC, small LE, and epithelial cells are present.      PAST MEDICAL & SURGICAL HISTORY:  Parkinson disease    Hypertension    Hypothyroid    Anxiety    Rectal mass    Osteoarthritis    Edema leg    BALJINDER (obstructive sleep apnea)  based on stop bang score of 6    Renal cell carcinoma    Urothelial cancer    Bell's palsy    Hypomagnesemia    History of hip replacement  right    H/O resection of rectum  large mass, was non-cancerous    H/O ileostomy  reversed 2014    H/O laminectomy  lumbar x2    S/P hernia repair  incisional    History of reversal of ileostomy    S/P deep brain stimulator placement    MEDICATIONS  (STANDING):  lactated ringers Bolus 500 milliLiter(s) IV Bolus once  lactated ringers. 1000 milliLiter(s) (100 mL/Hr) IV Continuous <Continuous>    MEDICATIONS  Home Medications:  Acidophilus oral tablet: 2 tab(s) orally 2 times a day (06 Sep 2023 15:55)  amLODIPine 10 mg oral tablet: 1 tab(s) orally once a day (06 Sep 2023 15:55)  aspirin 81 mg oral tablet: 1 tab(s) orally once a day (06 Sep 2023 15:55)  atorvastatin 20 mg oral tablet: 1 tab(s) orally once a day (06 Sep 2023 15:55)  carbidopa-levodopa 25 mg-250 mg oral tablet: 0.5 tab(s) orally 4 times a day (06 Sep 2023 15:55)  Flomax 0.4 mg oral capsule: 2 cap(s) orally once a day (at bedtime) (06 Sep 2023 15:55)  losartan 100 mg oral tablet: 1 tab(s) orally once a day (06 Sep 2023 15:55)  magnesium oxide 400 mg oral tablet: 1 tab(s) orally 3 times a day (06 Sep 2023 15:55)  metoprolol succinate 50 mg oral tablet, extended release: 1 tab(s) orally once a day (06 Sep 2023 15:55)  senna leaf extract oral tablet: 2 tab(s) orally once a day (at bedtime) As needed Constipation (07 Sep 2023 16:41)    FAMILY HISTORY:  Father: pancreatic ca  Mother: breast ca  Brother: renal ca    Allergies  No Known Allergies    SOCIAL HISTORY:  Denies toxic habits    REVIEW OF SYSTEMS: Otherwise negative as stated in HPI    PHYSICAL EXAM:  Vital Signs Last 24 Hrs  T(C): 36.3 (22 Dec 2023 20:45), Max: 36.4 (22 Dec 2023 16:37)  T(F): 97.4 (22 Dec 2023 20:45), Max: 97.5 (22 Dec 2023 16:37)  HR: 66 (22 Dec 2023 20:45) (63 - 71)  BP: 88/47 (22 Dec 2023 20:45) (88/47 - 95/49)  BP(mean): 60 (22 Dec 2023 20:45) (60 - 60)  RR: 18 (22 Dec 2023 20:45) (16 - 18)  SpO2: 98% (22 Dec 2023 20:45) (96% - 100%)    Parameters below as of 22 Dec 2023 20:45  Patient On (Oxygen Delivery Method): room air    General: chronically ill appearing, A+O, no distress    Respiratory and Thorax: no resp distress   	  Cardiovascular: regular     Gastrointestinal: soft, distended, nontender.    Genitourinary: no CVAT                        	  Extremities:  b/l edema, nontender    Neuro: equal bilateral weakness    Psych: appropriate    LABS:                        7.7    12.51 )-----------( 326      ( 22 Dec 2023 14:00 )             24.2     12-22    130<L>  |  96<L>  |  27<H>  ----------------------------<  121<H>  3.1<L>   |  19<L>  |  3.93<H>    Ca    7.2<L>      22 Dec 2023 14:00  Phos  3.1       Mg     1.60         TPro  6.4  /  Alb  2.7<L>  /  TBili  0.8  /  DBili  x   /  AST  26  /  ALT  <5  /  AlkPhos  79      Urinalysis Basic - ( 22 Dec 2023 16:30 )  Color: Yellow / Appearance: Cloudy / S.016 / pH: x  Gluc: x / Ketone: Trace mg/dL  / Bili: Negative / Urobili: 0.2 mg/dL   Blood: x / Protein: 100 mg/dL / Nitrite: Negative   Leuk Esterase: Small / RBC: 11 /HPF / WBC 13 /HPF   Sq Epi: x / Non Sq Epi: 8 /HPF / Bacteria: Negative /HPF    RADIOLOGY:  < from: CT Abdomen and Pelvis No Cont (23 @ 16:48) >  FINDINGS: Evaluation is suboptimal due to streak artifacts from patient's   arms.    LOWER CHEST: Interval increase in bibasilar pulmonary nodules, a 2 cm   nodule in the left lower lobe (301, 8), previously 1.1 cm. A 2.1 cm   subpleural nodule in the right lower lobe, not previously visualized.    LIVER: Within normal limits.  BILE DUCTS: Normal caliber.  GALLBLADDER: Within normal limits.  SPLEEN: Within normal limits.  PANCREAS: Within normal limits.  ADRENALS: Within normal limits.  KIDNEYS/URETERS: Extensive soft tissue mass replacing the left kidney,   measuring 17 x 19 cm.Left hydroureteronephrosis to the level of the mass   in the urinary bladder. Heterogeneous mass in the right kidney not well   visualized in the absence of IV contrast but measures 5 x 4.3 cm. No   hydronephrosis on the right. Punctate nonobstructing stones on the right.    BLADDER: Only minimally distended but demonstrates heterogeneous material   within the left posterolateral wall, measuring 3.2 cm.  REPRODUCTIVE ORGANS: Prostate is enlarged.    BOWEL: No bowel obstruction. Postsurgical changes in the rectum. There is   increased soft tissue density in the pelvis. Postsurgical changes in the   bowel in right upper quadrant.  PERITONEUM: No ascites.  VESSELS: Atherosclerotic changes.  RETROPERITONEUM/LYMPH NODES: Left retroperitoneal lymphadenopathy and   slightly enlarged retrocrural lymph nodes.  ABDOMINAL WALL: Within normal limits.  BONES: Degenerative changes. Right total hip replacement.    IMPRESSION:  Interval progression of metastatic disease to the chest since 2023.  Large heterogeneous mass replacing the left kidney, consistent with known   neoplasm. Left hydroureteronephrosis to the level of a mass in the   urinary bladder. Likely additional disease within the deep pelvis. A   heterogeneous solid mass in the rightkidney.    --- End of Report ---    QUE FISCHER MD; Attending Radiologist  This document has been electronically signed. 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  Over the past 2 weeks he has become more weak and fatigued with c/o diarrhea and poor po intake. Ct performed showing Interval progression of metastatic disease to the chest since 2023. Large heterogeneous mass replacing the left kidney, consistent with known  neoplasm. Left hydroureteronephrosis to the level of a mass in the urinary bladder. Likely additional disease within the deep pelvis. A   heterogeneous solid mass in the right kidney.  He denies abdominal pain or flank pain.  Denies dysuria, hematuria, increase urgency or frequency, and denies retention.  Denies fevers.  He has required ureteral stents and Nephrostomy tubes over the past year, most recently had a stent , removed .    In the ED, he is afebrile, with BP 88/47-95/49.  WBC 12.5, Hgb 7.7m Hct 24.2, creatinine 3.9, Na 130, K 3.1, lactate 1.7, UA with blood, 11RBC, negative bacteria 13 WBC, small LE, and epithelial cells are present.      PAST MEDICAL & SURGICAL HISTORY:  Parkinson disease    Hypertension    Hypothyroid    Anxiety    Rectal mass    Osteoarthritis    Edema leg    BALJINDER (obstructive sleep apnea)  based on stop bang score of 6    Renal cell carcinoma    Urothelial cancer    Bell's palsy    Hypomagnesemia    History of hip replacement  right    H/O resection of rectum  large mass, was non-cancerous    H/O ileostomy  reversed 2014    H/O laminectomy  lumbar x2    S/P hernia repair  incisional    History of reversal of ileostomy    S/P deep brain stimulator placement    MEDICATIONS  (STANDING):  lactated ringers Bolus 500 milliLiter(s) IV Bolus once  lactated ringers. 1000 milliLiter(s) (100 mL/Hr) IV Continuous <Continuous>    MEDICATIONS  Home Medications:  Acidophilus oral tablet: 2 tab(s) orally 2 times a day (06 Sep 2023 15:55)  amLODIPine 10 mg oral tablet: 1 tab(s) orally once a day (06 Sep 2023 15:55)  aspirin 81 mg oral tablet: 1 tab(s) orally once a day (06 Sep 2023 15:55)  atorvastatin 20 mg oral tablet: 1 tab(s) orally once a day (06 Sep 2023 15:55)  carbidopa-levodopa 25 mg-250 mg oral tablet: 0.5 tab(s) orally 4 times a day (06 Sep 2023 15:55)  Flomax 0.4 mg oral capsule: 2 cap(s) orally once a day (at bedtime) (06 Sep 2023 15:55)  losartan 100 mg oral tablet: 1 tab(s) orally once a day (06 Sep 2023 15:55)  magnesium oxide 400 mg oral tablet: 1 tab(s) orally 3 times a day (06 Sep 2023 15:55)  metoprolol succinate 50 mg oral tablet, extended release: 1 tab(s) orally once a day (06 Sep 2023 15:55)  senna leaf extract oral tablet: 2 tab(s) orally once a day (at bedtime) As needed Constipation (07 Sep 2023 16:41)    FAMILY HISTORY:  Father: pancreatic ca  Mother: breast ca  Brother: renal ca    Allergies  No Known Allergies    SOCIAL HISTORY:  Denies toxic habits    REVIEW OF SYSTEMS: Otherwise negative as stated in HPI    PHYSICAL EXAM:  Vital Signs Last 24 Hrs  T(C): 36.3 (22 Dec 2023 20:45), Max: 36.4 (22 Dec 2023 16:37)  T(F): 97.4 (22 Dec 2023 20:45), Max: 97.5 (22 Dec 2023 16:37)  HR: 66 (22 Dec 2023 20:45) (63 - 71)  BP: 88/47 (22 Dec 2023 20:45) (88/47 - 95/49)  BP(mean): 60 (22 Dec 2023 20:45) (60 - 60)  RR: 18 (22 Dec 2023 20:45) (16 - 18)  SpO2: 98% (22 Dec 2023 20:45) (96% - 100%)    Parameters below as of 22 Dec 2023 20:45  Patient On (Oxygen Delivery Method): room air    General: chronically ill appearing, A+O, no distress    Respiratory and Thorax: no resp distress   	  Cardiovascular: regular     Gastrointestinal: soft, distended, nontender.    Genitourinary: no CVAT                        	  Extremities:  b/l edema, nontender    Neuro: equal bilateral weakness    Psych: appropriate    LABS:                        7.7    12.51 )-----------( 326      ( 22 Dec 2023 14:00 )             24.2     12-22    130<L>  |  96<L>  |  27<H>  ----------------------------<  121<H>  3.1<L>   |  19<L>  |  3.93<H>    Ca    7.2<L>      22 Dec 2023 14:00  Phos  3.1       Mg     1.60         TPro  6.4  /  Alb  2.7<L>  /  TBili  0.8  /  DBili  x   /  AST  26  /  ALT  <5  /  AlkPhos  79      Urinalysis Basic - ( 22 Dec 2023 16:30 )  Color: Yellow / Appearance: Cloudy / S.016 / pH: x  Gluc: x / Ketone: Trace mg/dL  / Bili: Negative / Urobili: 0.2 mg/dL   Blood: x / Protein: 100 mg/dL / Nitrite: Negative   Leuk Esterase: Small / RBC: 11 /HPF / WBC 13 /HPF   Sq Epi: x / Non Sq Epi: 8 /HPF / Bacteria: Negative /HPF    RADIOLOGY:  < from: CT Abdomen and Pelvis No Cont (23 @ 16:48) >  FINDINGS: Evaluation is suboptimal due to streak artifacts from patient's   arms.    LOWER CHEST: Interval increase in bibasilar pulmonary nodules, a 2 cm   nodule in the left lower lobe (301, 8), previously 1.1 cm. A 2.1 cm   subpleural nodule in the right lower lobe, not previously visualized.    LIVER: Within normal limits.  BILE DUCTS: Normal caliber.  GALLBLADDER: Within normal limits.  SPLEEN: Within normal limits.  PANCREAS: Within normal limits.  ADRENALS: Within normal limits.  KIDNEYS/URETERS: Extensive soft tissue mass replacing the left kidney,   measuring 17 x 19 cm.Left hydroureteronephrosis to the level of the mass   in the urinary bladder. Heterogeneous mass in the right kidney not well   visualized in the absence of IV contrast but measures 5 x 4.3 cm. No   hydronephrosis on the right. Punctate nonobstructing stones on the right.    BLADDER: Only minimally distended but demonstrates heterogeneous material   within the left posterolateral wall, measuring 3.2 cm.  REPRODUCTIVE ORGANS: Prostate is enlarged.    BOWEL: No bowel obstruction. Postsurgical changes in the rectum. There is   increased soft tissue density in the pelvis. Postsurgical changes in the   bowel in right upper quadrant.  PERITONEUM: No ascites.  VESSELS: Atherosclerotic changes.  RETROPERITONEUM/LYMPH NODES: Left retroperitoneal lymphadenopathy and   slightly enlarged retrocrural lymph nodes.  ABDOMINAL WALL: Within normal limits.  BONES: Degenerative changes. Right total hip replacement.    IMPRESSION:  Interval progression of metastatic disease to the chest since 2023.  Large heterogeneous mass replacing the left kidney, consistent with known   neoplasm. Left hydroureteronephrosis to the level of a mass in the   urinary bladder. Likely additional disease within the deep pelvis. A   heterogeneous solid mass in the rightkidney.    --- End of Report ---    QUE FISCHER MD; Attending Radiologist  This document has been electronically signed. Dec 22 2023  5:12PM    < end of copied text >

## 2023-12-22 NOTE — ED ADULT NURSE NOTE - OBJECTIVE STATEMENT
69 y/o male present in ED c/o excessive diarrhea x 4 days after starting new medication. As per family, pt has been recently receiving weekly a blood transfusion  and is concerned the patient may be dehydrated. Pt currently denies any vomiting or abdominal pain but endorses lower back pain. 67 y/o male present in ED c/o excessive diarrhea x 4 days after starting new medication. As per family, pt has been recently receiving weekly a blood transfusion  and is concerned the patient may be dehydrated. Pt currently denies any vomiting or abdominal pain but endorses lower back pain.

## 2023-12-23 DIAGNOSIS — D63.8 ANEMIA IN OTHER CHRONIC DISEASES CLASSIFIED ELSEWHERE: ICD-10-CM

## 2023-12-23 DIAGNOSIS — I95.9 HYPOTENSION, UNSPECIFIED: ICD-10-CM

## 2023-12-23 DIAGNOSIS — I10 ESSENTIAL (PRIMARY) HYPERTENSION: ICD-10-CM

## 2023-12-23 DIAGNOSIS — C68.9 MALIGNANT NEOPLASM OF URINARY ORGAN, UNSPECIFIED: ICD-10-CM

## 2023-12-23 DIAGNOSIS — G20.A1 PARKINSON'S DISEASE WITHOUT DYSKINESIA, WITHOUT MENTION OF FLUCTUATIONS: ICD-10-CM

## 2023-12-23 DIAGNOSIS — N17.9 ACUTE KIDNEY FAILURE, UNSPECIFIED: ICD-10-CM

## 2023-12-23 DIAGNOSIS — Z29.9 ENCOUNTER FOR PROPHYLACTIC MEASURES, UNSPECIFIED: ICD-10-CM

## 2023-12-23 DIAGNOSIS — E03.9 HYPOTHYROIDISM, UNSPECIFIED: ICD-10-CM

## 2023-12-23 DIAGNOSIS — E87.8 OTHER DISORDERS OF ELECTROLYTE AND FLUID BALANCE, NOT ELSEWHERE CLASSIFIED: ICD-10-CM

## 2023-12-23 LAB
ALBUMIN SERPL ELPH-MCNC: 2.6 G/DL — LOW (ref 3.3–5)
ALBUMIN SERPL ELPH-MCNC: 2.6 G/DL — LOW (ref 3.3–5)
ALP SERPL-CCNC: 86 U/L — SIGNIFICANT CHANGE UP (ref 40–120)
ALP SERPL-CCNC: 86 U/L — SIGNIFICANT CHANGE UP (ref 40–120)
ALT FLD-CCNC: 7 U/L — SIGNIFICANT CHANGE UP (ref 4–41)
ALT FLD-CCNC: 7 U/L — SIGNIFICANT CHANGE UP (ref 4–41)
ANION GAP SERPL CALC-SCNC: 18 MMOL/L — HIGH (ref 7–14)
ANION GAP SERPL CALC-SCNC: 18 MMOL/L — HIGH (ref 7–14)
AST SERPL-CCNC: 24 U/L — SIGNIFICANT CHANGE UP (ref 4–40)
AST SERPL-CCNC: 24 U/L — SIGNIFICANT CHANGE UP (ref 4–40)
BASOPHILS # BLD AUTO: 0.06 K/UL — SIGNIFICANT CHANGE UP (ref 0–0.2)
BASOPHILS # BLD AUTO: 0.06 K/UL — SIGNIFICANT CHANGE UP (ref 0–0.2)
BASOPHILS NFR BLD AUTO: 0.6 % — SIGNIFICANT CHANGE UP (ref 0–2)
BASOPHILS NFR BLD AUTO: 0.6 % — SIGNIFICANT CHANGE UP (ref 0–2)
BILIRUB SERPL-MCNC: 0.8 MG/DL — SIGNIFICANT CHANGE UP (ref 0.2–1.2)
BILIRUB SERPL-MCNC: 0.8 MG/DL — SIGNIFICANT CHANGE UP (ref 0.2–1.2)
BLD GP AB SCN SERPL QL: NEGATIVE — SIGNIFICANT CHANGE UP
BLD GP AB SCN SERPL QL: NEGATIVE — SIGNIFICANT CHANGE UP
BUN SERPL-MCNC: 26 MG/DL — HIGH (ref 7–23)
BUN SERPL-MCNC: 26 MG/DL — HIGH (ref 7–23)
CALCIUM SERPL-MCNC: 7.2 MG/DL — LOW (ref 8.4–10.5)
CALCIUM SERPL-MCNC: 7.2 MG/DL — LOW (ref 8.4–10.5)
CHLORIDE SERPL-SCNC: 101 MMOL/L — SIGNIFICANT CHANGE UP (ref 98–107)
CHLORIDE SERPL-SCNC: 101 MMOL/L — SIGNIFICANT CHANGE UP (ref 98–107)
CO2 SERPL-SCNC: 15 MMOL/L — LOW (ref 22–31)
CO2 SERPL-SCNC: 15 MMOL/L — LOW (ref 22–31)
CREAT SERPL-MCNC: 2.76 MG/DL — HIGH (ref 0.5–1.3)
CREAT SERPL-MCNC: 2.76 MG/DL — HIGH (ref 0.5–1.3)
EGFR: 24 ML/MIN/1.73M2 — LOW
EGFR: 24 ML/MIN/1.73M2 — LOW
EOSINOPHIL # BLD AUTO: 0.05 K/UL — SIGNIFICANT CHANGE UP (ref 0–0.5)
EOSINOPHIL # BLD AUTO: 0.05 K/UL — SIGNIFICANT CHANGE UP (ref 0–0.5)
EOSINOPHIL NFR BLD AUTO: 0.5 % — SIGNIFICANT CHANGE UP (ref 0–6)
EOSINOPHIL NFR BLD AUTO: 0.5 % — SIGNIFICANT CHANGE UP (ref 0–6)
GLUCOSE SERPL-MCNC: 85 MG/DL — SIGNIFICANT CHANGE UP (ref 70–99)
GLUCOSE SERPL-MCNC: 85 MG/DL — SIGNIFICANT CHANGE UP (ref 70–99)
HCT VFR BLD CALC: 23.7 % — LOW (ref 39–50)
HCT VFR BLD CALC: 23.7 % — LOW (ref 39–50)
HGB BLD-MCNC: 7.4 G/DL — LOW (ref 13–17)
HGB BLD-MCNC: 7.4 G/DL — LOW (ref 13–17)
IANC: 8.1 K/UL — HIGH (ref 1.8–7.4)
IANC: 8.1 K/UL — HIGH (ref 1.8–7.4)
IMM GRANULOCYTES NFR BLD AUTO: 1.7 % — HIGH (ref 0–0.9)
IMM GRANULOCYTES NFR BLD AUTO: 1.7 % — HIGH (ref 0–0.9)
LYMPHOCYTES # BLD AUTO: 0.71 K/UL — LOW (ref 1–3.3)
LYMPHOCYTES # BLD AUTO: 0.71 K/UL — LOW (ref 1–3.3)
LYMPHOCYTES # BLD AUTO: 7.4 % — LOW (ref 13–44)
LYMPHOCYTES # BLD AUTO: 7.4 % — LOW (ref 13–44)
MAGNESIUM SERPL-MCNC: 1.8 MG/DL — SIGNIFICANT CHANGE UP (ref 1.6–2.6)
MAGNESIUM SERPL-MCNC: 1.8 MG/DL — SIGNIFICANT CHANGE UP (ref 1.6–2.6)
MCHC RBC-ENTMCNC: 23 PG — LOW (ref 27–34)
MCHC RBC-ENTMCNC: 23 PG — LOW (ref 27–34)
MCHC RBC-ENTMCNC: 31.2 GM/DL — LOW (ref 32–36)
MCHC RBC-ENTMCNC: 31.2 GM/DL — LOW (ref 32–36)
MCV RBC AUTO: 73.6 FL — LOW (ref 80–100)
MCV RBC AUTO: 73.6 FL — LOW (ref 80–100)
MONOCYTES # BLD AUTO: 0.49 K/UL — SIGNIFICANT CHANGE UP (ref 0–0.9)
MONOCYTES # BLD AUTO: 0.49 K/UL — SIGNIFICANT CHANGE UP (ref 0–0.9)
MONOCYTES NFR BLD AUTO: 5.1 % — SIGNIFICANT CHANGE UP (ref 2–14)
MONOCYTES NFR BLD AUTO: 5.1 % — SIGNIFICANT CHANGE UP (ref 2–14)
NEUTROPHILS # BLD AUTO: 8.1 K/UL — HIGH (ref 1.8–7.4)
NEUTROPHILS # BLD AUTO: 8.1 K/UL — HIGH (ref 1.8–7.4)
NEUTROPHILS NFR BLD AUTO: 84.7 % — HIGH (ref 43–77)
NEUTROPHILS NFR BLD AUTO: 84.7 % — HIGH (ref 43–77)
NRBC # BLD: 0 /100 WBCS — SIGNIFICANT CHANGE UP (ref 0–0)
NRBC # BLD: 0 /100 WBCS — SIGNIFICANT CHANGE UP (ref 0–0)
NRBC # FLD: 0 K/UL — SIGNIFICANT CHANGE UP (ref 0–0)
NRBC # FLD: 0 K/UL — SIGNIFICANT CHANGE UP (ref 0–0)
PHOSPHATE SERPL-MCNC: 3.3 MG/DL — SIGNIFICANT CHANGE UP (ref 2.5–4.5)
PHOSPHATE SERPL-MCNC: 3.3 MG/DL — SIGNIFICANT CHANGE UP (ref 2.5–4.5)
PLATELET # BLD AUTO: 338 K/UL — SIGNIFICANT CHANGE UP (ref 150–400)
PLATELET # BLD AUTO: 338 K/UL — SIGNIFICANT CHANGE UP (ref 150–400)
POTASSIUM SERPL-MCNC: 3 MMOL/L — LOW (ref 3.5–5.3)
POTASSIUM SERPL-MCNC: 3 MMOL/L — LOW (ref 3.5–5.3)
POTASSIUM SERPL-SCNC: 3 MMOL/L — LOW (ref 3.5–5.3)
POTASSIUM SERPL-SCNC: 3 MMOL/L — LOW (ref 3.5–5.3)
PROCALCITONIN SERPL-MCNC: 12.33 NG/ML — HIGH (ref 0.02–0.1)
PROCALCITONIN SERPL-MCNC: 12.33 NG/ML — HIGH (ref 0.02–0.1)
PROT SERPL-MCNC: 6 G/DL — SIGNIFICANT CHANGE UP (ref 6–8.3)
PROT SERPL-MCNC: 6 G/DL — SIGNIFICANT CHANGE UP (ref 6–8.3)
RBC # BLD: 3.22 M/UL — LOW (ref 4.2–5.8)
RBC # BLD: 3.22 M/UL — LOW (ref 4.2–5.8)
RBC # FLD: 19.8 % — HIGH (ref 10.3–14.5)
RBC # FLD: 19.8 % — HIGH (ref 10.3–14.5)
RH IG SCN BLD-IMP: POSITIVE — SIGNIFICANT CHANGE UP
RH IG SCN BLD-IMP: POSITIVE — SIGNIFICANT CHANGE UP
SODIUM SERPL-SCNC: 134 MMOL/L — LOW (ref 135–145)
SODIUM SERPL-SCNC: 134 MMOL/L — LOW (ref 135–145)
T4 FREE SERPL-MCNC: 0.5 NG/DL — LOW (ref 0.9–1.8)
T4 FREE SERPL-MCNC: 0.5 NG/DL — LOW (ref 0.9–1.8)
TSH SERPL-MCNC: 35.24 UIU/ML — HIGH (ref 0.27–4.2)
TSH SERPL-MCNC: 35.24 UIU/ML — HIGH (ref 0.27–4.2)
URATE SERPL-MCNC: 12.3 MG/DL — HIGH (ref 3.4–8.8)
URATE SERPL-MCNC: 12.3 MG/DL — HIGH (ref 3.4–8.8)
WBC # BLD: 9.57 K/UL — SIGNIFICANT CHANGE UP (ref 3.8–10.5)
WBC # BLD: 9.57 K/UL — SIGNIFICANT CHANGE UP (ref 3.8–10.5)
WBC # FLD AUTO: 9.57 K/UL — SIGNIFICANT CHANGE UP (ref 3.8–10.5)
WBC # FLD AUTO: 9.57 K/UL — SIGNIFICANT CHANGE UP (ref 3.8–10.5)

## 2023-12-23 PROCEDURE — 99223 1ST HOSP IP/OBS HIGH 75: CPT | Mod: GC

## 2023-12-23 RX ORDER — ONDANSETRON 8 MG/1
4 TABLET, FILM COATED ORAL EVERY 8 HOURS
Refills: 0 | Status: DISCONTINUED | OUTPATIENT
Start: 2023-12-23 | End: 2023-12-25

## 2023-12-23 RX ORDER — ASPIRIN/CALCIUM CARB/MAGNESIUM 324 MG
1 TABLET ORAL
Refills: 0 | DISCHARGE

## 2023-12-23 RX ORDER — DIPHENOXYLATE HCL/ATROPINE 2.5-.025MG
1 TABLET ORAL
Refills: 0 | DISCHARGE

## 2023-12-23 RX ORDER — PIPERACILLIN AND TAZOBACTAM 4; .5 G/20ML; G/20ML
3.38 INJECTION, POWDER, LYOPHILIZED, FOR SOLUTION INTRAVENOUS EVERY 12 HOURS
Refills: 0 | Status: DISCONTINUED | OUTPATIENT
Start: 2023-12-23 | End: 2023-12-23

## 2023-12-23 RX ORDER — LEVOTHYROXINE SODIUM 125 MCG
75 TABLET ORAL DAILY
Refills: 0 | Status: DISCONTINUED | OUTPATIENT
Start: 2023-12-23 | End: 2023-12-23

## 2023-12-23 RX ORDER — HEPARIN SODIUM 5000 [USP'U]/ML
5000 INJECTION INTRAVENOUS; SUBCUTANEOUS EVERY 8 HOURS
Refills: 0 | Status: DISCONTINUED | OUTPATIENT
Start: 2023-12-23 | End: 2023-12-25

## 2023-12-23 RX ORDER — MAGNESIUM SULFATE 500 MG/ML
2 VIAL (ML) INJECTION ONCE
Refills: 0 | Status: COMPLETED | OUTPATIENT
Start: 2023-12-23 | End: 2023-12-23

## 2023-12-23 RX ORDER — POTASSIUM CHLORIDE 20 MEQ
1 PACKET (EA) ORAL
Refills: 0 | DISCHARGE

## 2023-12-23 RX ORDER — ACETAMINOPHEN 500 MG
650 TABLET ORAL EVERY 6 HOURS
Refills: 0 | Status: DISCONTINUED | OUTPATIENT
Start: 2023-12-23 | End: 2023-12-25

## 2023-12-23 RX ORDER — CARBIDOPA AND LEVODOPA 25; 100 MG/1; MG/1
0.5 TABLET ORAL
Refills: 0 | Status: DISCONTINUED | OUTPATIENT
Start: 2023-12-23 | End: 2023-12-25

## 2023-12-23 RX ORDER — TAMSULOSIN HYDROCHLORIDE 0.4 MG/1
0.8 CAPSULE ORAL AT BEDTIME
Refills: 0 | Status: DISCONTINUED | OUTPATIENT
Start: 2023-12-23 | End: 2023-12-25

## 2023-12-23 RX ORDER — MAGNESIUM OXIDE 400 MG ORAL TABLET 241.3 MG
1 TABLET ORAL
Refills: 0 | DISCHARGE

## 2023-12-23 RX ORDER — METOPROLOL TARTRATE 50 MG
1 TABLET ORAL
Refills: 0 | DISCHARGE

## 2023-12-23 RX ORDER — POTASSIUM CHLORIDE 20 MEQ
40 PACKET (EA) ORAL ONCE
Refills: 0 | Status: COMPLETED | OUTPATIENT
Start: 2023-12-23 | End: 2023-12-23

## 2023-12-23 RX ORDER — ATORVASTATIN CALCIUM 80 MG/1
20 TABLET, FILM COATED ORAL AT BEDTIME
Refills: 0 | Status: DISCONTINUED | OUTPATIENT
Start: 2023-12-23 | End: 2023-12-25

## 2023-12-23 RX ORDER — LEVOTHYROXINE SODIUM 125 MCG
0 TABLET ORAL
Qty: 0 | Refills: 3 | DISCHARGE

## 2023-12-23 RX ORDER — LEVOTHYROXINE SODIUM 125 MCG
125 TABLET ORAL DAILY
Refills: 0 | Status: DISCONTINUED | OUTPATIENT
Start: 2023-12-23 | End: 2023-12-25

## 2023-12-23 RX ORDER — SENNA PLUS 8.6 MG/1
2 TABLET ORAL AT BEDTIME
Refills: 0 | Status: DISCONTINUED | OUTPATIENT
Start: 2023-12-23 | End: 2023-12-25

## 2023-12-23 RX ORDER — MUPIROCIN 20 MG/G
1 OINTMENT TOPICAL
Refills: 0 | Status: DISCONTINUED | OUTPATIENT
Start: 2023-12-23 | End: 2023-12-25

## 2023-12-23 RX ORDER — LANOLIN ALCOHOL/MO/W.PET/CERES
3 CREAM (GRAM) TOPICAL AT BEDTIME
Refills: 0 | Status: DISCONTINUED | OUTPATIENT
Start: 2023-12-23 | End: 2023-12-25

## 2023-12-23 RX ORDER — VANCOMYCIN HCL 1 G
125 VIAL (EA) INTRAVENOUS EVERY 6 HOURS
Refills: 0 | Status: DISCONTINUED | OUTPATIENT
Start: 2023-12-23 | End: 2023-12-23

## 2023-12-23 RX ORDER — ATORVASTATIN CALCIUM 80 MG/1
1 TABLET, FILM COATED ORAL
Refills: 0 | DISCHARGE

## 2023-12-23 RX ORDER — ASPIRIN/CALCIUM CARB/MAGNESIUM 324 MG
81 TABLET ORAL DAILY
Refills: 0 | Status: DISCONTINUED | OUTPATIENT
Start: 2023-12-23 | End: 2023-12-25

## 2023-12-23 RX ORDER — ALPRAZOLAM 0.25 MG
0.25 TABLET ORAL ONCE
Refills: 0 | Status: DISCONTINUED | OUTPATIENT
Start: 2023-12-23 | End: 2023-12-23

## 2023-12-23 RX ORDER — LEVOTHYROXINE SODIUM 125 MCG
1 TABLET ORAL
Refills: 0 | DISCHARGE

## 2023-12-23 RX ORDER — MAGNESIUM OXIDE 400 MG ORAL TABLET 241.3 MG
400 TABLET ORAL
Refills: 0 | Status: DISCONTINUED | OUTPATIENT
Start: 2023-12-23 | End: 2023-12-25

## 2023-12-23 RX ADMIN — HEPARIN SODIUM 5000 UNIT(S): 5000 INJECTION INTRAVENOUS; SUBCUTANEOUS at 14:55

## 2023-12-23 RX ADMIN — CARBIDOPA AND LEVODOPA 0.5 TABLET(S): 25; 100 TABLET ORAL at 23:16

## 2023-12-23 RX ADMIN — ATORVASTATIN CALCIUM 20 MILLIGRAM(S): 80 TABLET, FILM COATED ORAL at 23:16

## 2023-12-23 RX ADMIN — Medication 0.25 MILLIGRAM(S): at 01:38

## 2023-12-23 RX ADMIN — Medication 81 MILLIGRAM(S): at 12:36

## 2023-12-23 RX ADMIN — SODIUM CHLORIDE 100 MILLILITER(S): 9 INJECTION, SOLUTION INTRAVENOUS at 10:04

## 2023-12-23 RX ADMIN — MAGNESIUM OXIDE 400 MG ORAL TABLET 400 MILLIGRAM(S): 241.3 TABLET ORAL at 10:01

## 2023-12-23 RX ADMIN — Medication 40 MILLIEQUIVALENT(S): at 18:29

## 2023-12-23 RX ADMIN — CARBIDOPA AND LEVODOPA 0.5 TABLET(S): 25; 100 TABLET ORAL at 18:28

## 2023-12-23 RX ADMIN — Medication 125 MILLIGRAM(S): at 06:29

## 2023-12-23 RX ADMIN — Medication 650 MILLIGRAM(S): at 10:01

## 2023-12-23 RX ADMIN — MAGNESIUM OXIDE 400 MG ORAL TABLET 400 MILLIGRAM(S): 241.3 TABLET ORAL at 14:55

## 2023-12-23 RX ADMIN — Medication 25 GRAM(S): at 14:55

## 2023-12-23 RX ADMIN — MAGNESIUM OXIDE 400 MG ORAL TABLET 400 MILLIGRAM(S): 241.3 TABLET ORAL at 18:28

## 2023-12-23 RX ADMIN — SODIUM CHLORIDE 100 MILLILITER(S): 9 INJECTION, SOLUTION INTRAVENOUS at 23:30

## 2023-12-23 RX ADMIN — TAMSULOSIN HYDROCHLORIDE 0.8 MILLIGRAM(S): 0.4 CAPSULE ORAL at 23:16

## 2023-12-23 RX ADMIN — Medication 125 MILLIGRAM(S): at 12:35

## 2023-12-23 RX ADMIN — HEPARIN SODIUM 5000 UNIT(S): 5000 INJECTION INTRAVENOUS; SUBCUTANEOUS at 05:16

## 2023-12-23 RX ADMIN — MUPIROCIN 1 APPLICATION(S): 20 OINTMENT TOPICAL at 18:29

## 2023-12-23 RX ADMIN — CARBIDOPA AND LEVODOPA 0.5 TABLET(S): 25; 100 TABLET ORAL at 12:36

## 2023-12-23 RX ADMIN — Medication 25 GRAM(S): at 05:17

## 2023-12-23 RX ADMIN — CARBIDOPA AND LEVODOPA 0.5 TABLET(S): 25; 100 TABLET ORAL at 06:29

## 2023-12-23 NOTE — H&P ADULT - NSHPREVIEWOFSYSTEMS_GEN_ALL_CORE
Constitutional: denies weight loss, fatigue, fevers, chills  Skin: denies rashes or wounds  HEENT: no vision or hearing changes  CV: denies SALDANA or ALEXANDER  Resp: denies SOB or cough  GI: denies diarrhea, constipation, N/V, or changes to appetite  Urinary: denies urgency, dysuria  Neurologic: denies headache or pain  MSK: denies arthralgias or myalgias  Hematologic: denies bleeding or easy bruising  Lymphatics: denies LAD or recent infection

## 2023-12-23 NOTE — PATIENT PROFILE ADULT - PATIENT REPRESENTATIVE: ( YOU CAN CHOOSE ANY PERSON THAT CAN ASSIST YOU WITH YOUR HEALTH CARE PREFERENCES, DOES NOT HAVE TO BE A SPOUSE, IMMEDIATE FAMILY OR SIGNIFICANT OTHER/PARTNER)
After the procedure    Drink plenty of fluids.  You may have burning or light bleeding when you urinate--this is normal.  Medications may be prescribed to ease any discomfort or prevent infection. Take these as directed.  Call your doctor if you have heavy bleeding or blood clots, burning that lasts more than a day, a fever over 100°F  (38° C), or trouble urinating.    After Surgery:  Always be aware that any surgery can cause these symptoms:    Pain- Medication can be prescribed for pain to decrease your pain but may not completely take your pain away.  Over the Counter pain medicine my be enough and you can always use Ice and rest to help ease pain.    Bleeding- a little bleeding after a surgery is usually within normal.  If there is a lot of blood you need to notify your MD.  Emergency treatments of bleeding are cold application, elevation of the bleeding site and compression.    Infection- Infection after surgery is NOT a normal occurrence.  Signs of infection are fever, swelling, hot to touch the incision.  If this occurs notify your MD immediately.    Nausea- this can be common after a surgery especially if you have had anesthesia medicine or are taking pain medicine.  Staying on clear liquids, bland foods, gingerale, or over the counter anti nausea medicines can help.  If you vomit more than once, notify your MD.  Anti Nausea medicines can be prescribed.       
declines

## 2023-12-23 NOTE — H&P ADULT - NSHPSOCIALHISTORY_GEN_ALL_CORE
Home: Domiciled   ADL: Full ADL   Alcohol: Denies  Smoking: Denies   Drugs: Denies Home: Domiciled at home  ADL: Full ADLs  Alcohol: rarely  Smoking: Denies   Drugs: Denies

## 2023-12-23 NOTE — H&P ADULT - PROBLEM SELECTOR PLAN 5
> hold home meds i/s/o hypotension > cont home carbidope-levodopa Patient has a DBS.  > if patient should require a MRI for any reason, please inquire with radiology group about MRI compatibility of device.  > cont home carbidope-levodopa

## 2023-12-23 NOTE — H&P ADULT - PROBLEM SELECTOR PROBLEM 4
Met with patient. Discussed therapy rec of TCU. Patient declines TCU. Agrees to home care. Per previous CM note, patient has been accepted by [id number]    Parkinson disease Electrolyte abnormality

## 2023-12-23 NOTE — PROGRESS NOTE ADULT - ASSESSMENT
68M hx urothelial cancer (on Enfortumab Vedotin and Pembrolizumab last treatment 12/13, s/p L ureteral stent s/p exchange, mets to lung), R kidney stones (s/p R ureteroscopy w/ stone removal 8/2023), BPH, parkinson disease (s/p DBS), hypothyroidism (thyroid cancer s/p partial thyroidectomy), presented for MANISHA on outpatient labs i/s/o diarrhea and poor PO intake. IVF resuscitation and undergoing sepsis w/u. Nephrology and Urology consulted, no urgent intervention needed on L hydro seen on imaging 2/2 L renal mass.

## 2023-12-23 NOTE — ED ADULT NURSE REASSESSMENT NOTE - NS ED NURSE REASSESS COMMENT FT1
pt remains at baseline mental status, RR even unlabored completing full sentences. pt resting in stretcher comfortably at this time, no new complaints offered. stretcher lowest position siderails up safety measures in place. report given to ESSU1 RN, pt transported [facility] pt remains at baseline mental status, RR even unlabored completing full sentences. pt resting in stretcher comfortably at this time, no new complaints offered. stretcher lowest position siderails up safety measures in place. report given to ESSU1 RN, pt transported [facility]

## 2023-12-23 NOTE — PATIENT PROFILE ADULT - ARRIVAL FROM
brought by ambulance from [facility] brought by ambulance from [facility]

## 2023-12-23 NOTE — CHART NOTE - NSCHARTNOTEFT_GEN_A_CORE
spoke with renal regarding patient's lab work. recommends repleting potassium with 40meq PO x 1 and Mag Sulfate 2G IV x 1. Can check BMP daily. Will continue to monitor closely.

## 2023-12-23 NOTE — H&P ADULT - PROBLEM SELECTOR PLAN 9
> senna    DVT PPx: SQH  Diet: S+BS  Code:   Dispo: PT/OT Pending Hospital Course  Communication:     Discharge information:  Pharmacy -   PCP - > senna, ASA ?primary ppx?    DVT PPx: [facility]  Diet: S+BS  Code:   Dispo: PT/OT Pending Hospital Course  Communication:     Discharge information:  Pharmacy -   PCP -

## 2023-12-23 NOTE — H&P ADULT - PROBLEM SELECTOR PLAN 7
2/2 chronic disease / malignancy  > CTM on CBC qD  > T+S; transfusion goal >7 > cont home levothyroxine  > f/u thyroid studies

## 2023-12-23 NOTE — H&P ADULT - PROBLEM SELECTOR PLAN 1
Likely 2/2 dehydration due to poor PO intake and diarrhea, less likey sepsis/GI or UTI infection.  - UA, RVP neg  > f/u stool studies  > PO vanco; Cdiff iso  > f/u CXR Likely 2/2 dehydration due to poor PO intake and diarrhea, less likey sepsis/GI or UTI infection.  - UA, RVP neg  > f/u stool studies  > PO vanco; Cdiff iso  > f/u CXR  > f/u Bcx Likely 2/2 hypovolemia due to poor PO intake and diarrhea, less likely sepsis/GI or UTI infection. Recent course of levofloxacin outpatient for UTI.  - UA, RVP neg  > f/u stool studies  > PO vanco; Cdiff isolation precautions  > f/u CXR  > f/u Bcx

## 2023-12-23 NOTE — PATIENT PROFILE ADULT - FALL HARM RISK - HARM RISK INTERVENTIONS
Assistance with ambulation/Assistance OOB with selected safe patient handling equipment/Communicate Risk of Fall with Harm to all staff/Discuss with provider need for PT consult/Monitor gait and stability/Provide patient with walking aids - walker, cane, crutches/Reinforce activity limits and safety measures with patient and family/Tailored Fall Risk Interventions/Visual Cue: Yellow wristband and red socks/Bed in lowest position, wheels locked, appropriate side rails in place/Call bell, personal items and telephone in reach/Instruct patient to call for assistance before getting out of bed or chair/Non-slip footwear when patient is out of bed/[place] to call system/Physically safe environment - no spills, clutter or unnecessary equipment/Purposeful Proactive Rounding/Room/bathroom lighting operational, light cord in reach Assistance with ambulation/Assistance OOB with selected safe patient handling equipment/Communicate Risk of Fall with Harm to all staff/Discuss with provider need for PT consult/Monitor gait and stability/Provide patient with walking aids - walker, cane, crutches/Reinforce activity limits and safety measures with patient and family/Tailored Fall Risk Interventions/Visual Cue: Yellow wristband and red socks/Bed in lowest position, wheels locked, appropriate side rails in place/Call bell, personal items and telephone in reach/Instruct patient to call for assistance before getting out of bed or chair/Non-slip footwear when patient is out of bed/[place] to call system/Physically safe environment - no spills, clutter or unnecessary equipment/Purposeful Proactive Rounding/Room/bathroom lighting operational, light cord in reach

## 2023-12-23 NOTE — PROGRESS NOTE ADULT - SUBJECTIVE AND OBJECTIVE BOX
Name of Patient : MAR WILSON  MRN: [id number]  Date of visit: 12-23-23       Subjective: Patient seen and examined. No new events except as noted.   feeling better  improved urine output     REVIEW OF SYSTEMS:    CONSTITUTIONAL: No weakness, fevers or chills  EYES/ENT: No visual changes;  No vertigo or throat pain   NECK: No pain or stiffness  RESPIRATORY: No cough, wheezing, hemoptysis; No shortness of breath  CARDIOVASCULAR: No chest pain or palpitations  GASTROINTESTINAL: No abdominal or epigastric pain. No nausea, vomiting, or hematemesis; No diarrhea or constipation. No melena or hematochezia.  GENITOURINARY: No dysuria, frequency or hematuria  NEUROLOGICAL: No numbness or weakness  SKIN: No itching, burning, rashes, or lesions   All other review of systems is negative unless indicated above.    MEDICATIONS:  MEDICATIONS  (STANDING):  aspirin enteric coated 81 milliGRAM(s) Oral daily  atorvastatin 20 milliGRAM(s) Oral at bedtime  carbidopa/levodopa  25/250 0.5 Tablet(s) Oral four times a day  heparin   Injectable 5000 Unit(s) SubCutaneous every 8 hours  lactated ringers. 1000 milliLiter(s) (100 mL/Hr) IV Continuous <Continuous>  levothyroxine 125 MICROGram(s) Oral daily  magnesium oxide 400 milliGRAM(s) Oral three times a day with meals  mupirocin 2% Ointment 1 Application(s) Topical two times a day  tamsulosin 0.8 milliGRAM(s) Oral at bedtime      PHYSICAL EXAM:  T(C): 36.9 (12-23-23 @ 18:16), Max: 36.9 (12-23-23 @ 18:16)  HR: 74 (12-23-23 @ 18:16) (67 - 77)  BP: 106/45 (12-23-23 @ 18:16) (92/51 - 106/45)  RR: 22 (12-23-23 @ 18:16) (17 - 22)  SpO2: 95% (12-23-23 @ 18:16) (95% - 100%)  Wt(kg): --  I&O's Summary    Height (cm): 175.3 (12-23 @ 20:51)  Weight (kg): 121.4 (12-23 @ 20:51)  BMI (kg/m2): 39.5 (12-23 @ 20:51)  BSA (m2): 2.34 (12-23 @ 20:51)    Appearance: Normal	  HEENT:  PERRLA   Lymphatic: No lymphadenopathy   Cardiovascular: Normal S1 S2, no JVD  Respiratory: normal effort , clear  Gastrointestinal:  Soft, Non-tender  Skin: No rashes,  warm to touch  Psychiatry:  Mood & affect appropriate  Musculuskeletal: No edema    recent labs, Imaging and EKGs personally reviewed                           7.4    9.57  )-----------( 338      ( 23 Dec 2023 13:04 )             23.7               12-23    134<L>  |  101  |  26<H>  ----------------------------<  85  3.0<L>   |  15<L>  |  2.76<H>    Ca    7.2<L>      23 Dec 2023 06:52  Phos  3.3     12-23  Mg     1.80     12-23    TPro  6.0  /  Alb  2.6<L>  /  TBili  0.8  /  DBili  x   /  AST  24  /  ALT  7   /  AlkPhos  86  12-23                       Urinalysis Basic - ( 23 Dec 2023 06:52 )    Color: x / Appearance: x / SG: x / pH: x  Gluc: 85 mg/dL / Ketone: x  / Bili: x / Urobili: x   Blood: x / Protein: x / Nitrite: x   Leuk Esterase: x / RBC: x / WBC x   Sq Epi: x / Non Sq Epi: x / Bacteria: x                     Name of Patient : MAR WILSON  MRN: [id number]  Date of visit: 12-23-23       Subjective: Patient seen and examined. No new events except as noted.   feeling better  improved urine output     REVIEW OF SYSTEMS:    CONSTITUTIONAL: No weakness, fevers or chills  EYES/ENT: No visual changes;  No vertigo or throat pain   NECK: No pain or stiffness  RESPIRATORY: No cough, wheezing, hemoptysis; No shortness of breath  CARDIOVASCULAR: No chest pain or palpitations  GASTROINTESTINAL: No abdominal or epigastric pain. No nausea, vomiting, or hematemesis; No diarrhea or constipation. No melena or hematochezia.  GENITOURINARY: No dysuria, frequency or hematuria  NEUROLOGICAL: No numbness or weakness  SKIN: No itching, burning, rashes, or lesions   All other review of systems is negative unless indicated above.    MEDICATIONS:  MEDICATIONS  (STANDING):  aspirin enteric coated 81 milliGRAM(s) Oral daily  atorvastatin 20 milliGRAM(s) Oral at bedtime  carbidopa/levodopa  25/250 0.5 Tablet(s) Oral four times a day  heparin   Injectable 5000 Unit(s) SubCutaneous every 8 hours  lactated ringers. 1000 milliLiter(s) (100 mL/Hr) IV Continuous <Continuous>  levothyroxine 125 MICROGram(s) Oral daily  magnesium oxide 400 milliGRAM(s) Oral three times a day with meals  mupirocin 2% Ointment 1 Application(s) Topical two times a day  tamsulosin 0.8 milliGRAM(s) Oral at bedtime      PHYSICAL EXAM:  T(C): 36.9 (12-23-23 @ 18:16), Max: 36.9 (12-23-23 @ 18:16)  HR: 74 (12-23-23 @ 18:16) (67 - 77)  BP: 106/45 (12-23-23 @ 18:16) (92/51 - 106/45)  RR: 22 (12-23-23 @ 18:16) (17 - 22)  SpO2: 95% (12-23-23 @ 18:16) (95% - 100%)  Wt(kg): --  I&O's Summary    Height (cm): 175.3 (12-23 @ 20:51)  Weight (kg): 121.4 (12-23 @ 20:51)  BMI (kg/m2): 39.5 (12-23 @ 20:51)  BSA (m2): 2.34 (12-23 @ 20:51)    Appearance: Normal	  HEENT:  PERRLA   Lymphatic: No lymphadenopathy   Cardiovascular: Normal S1 S2, no JVD  Respiratory: normal effort , clear  Gastrointestinal:  Soft, Non-tender  Skin: No rashes,  warm to touch  Psychiatry:  Mood & affect appropriate  Musculuskeletal: No edema    recent labs, Imaging and EKGs personally reviewed                           7.4    9.57  )-----------( 338      ( 23 Dec 2023 13:04 )             23.7               12-23    134<L>  |  101  |  26<H>  ----------------------------<  85  3.0<L>   |  15<L>  |  2.76<H>    Ca    7.2<L>      23 Dec 2023 06:52  Phos  3.3     12-23  Mg     1.80     12-23    TPro  6.0  /  Alb  2.6<L>  /  TBili  0.8  /  DBili  x   /  AST  24  /  ALT  7   /  AlkPhos  86  12-23                       Urinalysis Basic - ( 23 Dec 2023 06:52 )    Color: x / Appearance: x / SG: x / pH: x  Gluc: 85 mg/dL / Ketone: x  / Bili: x / Urobili: x   Blood: x / Protein: x / Nitrite: x   Leuk Esterase: x / RBC: x / WBC x   Sq Epi: x / Non Sq Epi: x / Bacteria: x

## 2023-12-23 NOTE — H&P ADULT - PROBLEM SELECTOR PLAN 2
2/2 hypotension, diarrhea/hypovolemia, ARB, ?sepsis  Baseline SCr 1.2  L renal mass a/w hydronephrosis, R renal mass non-obstructive  - renal sono at [facility] 12/22 w/o L hydro, mass increased in size from 10x8 on CT 9/6/23 to 21.5cm  - sp ~3L IVF in ED  - Urology consulted, no acute urologic intervention - hydro 2/2 tumor burden in non-functioning kidney, believed not to be contributing to MANISHA  > CTM SCr  > avoid nephrotoxic agents, renally dose medications to eGFR  > strict I/Os, daily weight  > nephrology, urology following  > urology recommended IR consult for PCN in AM if nephrology believes decompression would benefit patient  > cont home tamsulosin

## 2023-12-23 NOTE — ED ADULT NURSE REASSESSMENT NOTE - NS ED NURSE REASSESS COMMENT FT1
pt assisted using urinal at bedside, 200cc yellow output noted. pt repositioned in stretcher for comfort

## 2023-12-23 NOTE — H&P ADULT - NSHPPHYSICALEXAM_GEN_ALL_CORE
T(C): 36.4 (12-23-23 @ 02:00), Max: 36.4 (12-22-23 @ 16:37)  HR: 68 (12-23-23 @ 02:00) (63 - 71)  BP: 95/49 (12-23-23 @ 02:00) (88/47 - 97/56)  RR: 18 (12-23-23 @ 02:00) (16 - 18)  SpO2: 100% (12-23-23 @ 02:00) (96% - 100%)    GENERAL: well-appearing, NAD, appears comfortable  HEENT: NC/AT, EOMI, no conjunctival icterus, moist mucus membranes  NECK: supple, non-tender, no JVD, no LAD  LUNGS: non-labored breathing, CTAB, no wheezing/rales/rhonchi  CV: RRR, no m/r/g, 2+ palpable peripheral pulses bi/l, cap refill <2 secs  ABD: non-distended, soft, non-tender, no rebound tenderness or guarding  : no CVA tenderness  MSK: full ROM, strength 5/5 bi/l  EXT: warm and well-perfused, no peripheral edema  SKIN: no rashes or lesions  NEURO: AAOx3, no focal deficits T(C): 36.4 (12-23-23 @ 02:00), Max: 36.4 (12-22-23 @ 16:37)  HR: 68 (12-23-23 @ 02:00) (63 - 71)  BP: 95/49 (12-23-23 @ 02:00) (88/47 - 97/56)  RR: 18 (12-23-23 @ 02:00) (16 - 18)  SpO2: 100% (12-23-23 @ 02:00) (96% - 100%)    GENERAL: ill-appearing, NAD, appears comfortable  HEENT: NC/AT, EOMI, no conjunctival icterus, dry mucus membranes  NECK: supple, non-tender, no JVD, no LAD  LUNGS: non-labored breathing, CTAB, no wheezing/rales/rhonchi  CV: RRR, no m/r/g, 2+ palpable peripheral pulses bi/l, cap refill <2 secs  ABD: non-distended, soft, non-tender, no rebound tenderness or guarding  : no CVA tenderness  MSK: full ROM, strength 5/5 bi/l  EXT: warm and well-perfused, trace bi/l LE edema  SKIN: no rashes or lesions  NEURO: AAOx3, no focal deficits, baseline parkinsonian tremors T(C): 36.4 (12-23-23 @ 02:00), Max: 36.4 (12-22-23 @ 16:37)  HR: 68 (12-23-23 @ 02:00) (63 - 71)  BP: 95/49 (12-23-23 @ 02:00) (88/47 - 97/56)  RR: 18 (12-23-23 @ 02:00) (16 - 18)  SpO2: 100% (12-23-23 @ 02:00) (96% - 100%)    GENERAL: ill-appearing, NAD, appears comfortable  HEENT: NC/AT, EOMI, no conjunctival icterus, dry mucus membranes  NECK: supple, non-tender, no JVD, no LAD  LUNGS: non-labored breathing, CTAB, no wheezing/rales/rhonchi, + battery device on chest  CV: RRR, no m/r/g, 2+ palpable peripheral pulses bi/l, cap refill <2 secs  ABD: non-distended, soft, non-tender, no rebound tenderness or guarding  : no CVA tenderness  MSK: full ROM, strength 5/5 bi/l  EXT: warm and well-perfused, trace bi/l LE edema  SKIN: no rashes or lesions  NEURO: AAOx3, no focal deficits, baseline parkinsonian tremors

## 2023-12-23 NOTE — H&P ADULT - PROBLEM SELECTOR PLAN 3
On Enfortumab Vedotin and Pembrolizumab. Mets to lung.  L ureteral stent, c/b worsenening L hydro of non-functioning kidney  - urology consulted, no intervention  > IR for decompression On Enfortumab Vedotin and Pembrolizumab. Mets to lung.  L ureteral stent, c/b worsenening L hydro of non-functioning kidney  - urology consulted, no intervention  > IR consult for decompression On Enfortumab Vedotin and Pembrolizumab. Mets to lung.  L ureteral stent, c/b worsenening L hydro of non-functioning kidney  - urology consulted, no intervention  > IR consult for decompression  > f/u with Onc in AM (Dr. Soriano at [facility]) On Enfortumab Vedotin and Pembrolizumab. Mets to lung.  L ureteral stent, c/b worsenening L hydro of non-functioning kidney  - urology consulted, no intervention  > IR consult for decompression  > f/u with [place]c in AM [place] [facility] [place]

## 2023-12-23 NOTE — CHART NOTE - NSCHARTNOTEFT_GEN_A_CORE
ISTOP Reference #: [id number]  PDI	First Name	Last Name	Birth Date	Gender	Street Address	[facility] Code  A	Regis Schultzbowitz	1955	Male	[address]	[address]  B	Regis Juan	1955	Male	[address]	[facility]	[id number]    Prescription Information      PDI Filter:    PDI	Current Rx	Drug Type	Rx Written	Rx Dispensed	Drug	Quantity	Days Supply	Prescriber Name	Prescriber JORDYN #	Payment Method	Dispenser  A	N	O	10/18/2023	10/18/2023	oxycodone hcl (ir) 5 mg tablet	12	2	Leah Owen)	[id number]	Medicare	[facility]  A	N	B	07/12/2023	07/12/2023	alprazolam 0.25 mg tablet	56	28	Regis Lazcano H [id number]	Medicare	[facility]  A	N	O	06/21/2023	06/21/2023	oxycodone hcl (ir) 5 mg tablet	10	3	Regis Lazcano H	[id number]	Medicare	[facility]  A	N	B	05/31/2023	05/31/2023	alprazolam 0.25 mg tablet	28	14	Regis Lazcano H	[id number]	Medicare	[facility]  A	N	O	05/05/2023	05/05/2023	oxycodone hcl (ir) 5 mg tablet	15	4	[facility]	[id number]	Medicare	[facility]  B	Y	O	12/20/2023	12/20/2023	diphenoxylate-atropine 2.5-0.025 mg tablet	180	30	Regis Lazcano H	[id number]	Medicare	[facility] ISTOP Reference #: [id number]  PDI	First Name	Last Name	Birth Date	Gender	Street Address	[facility] Code  A	Regis Schultzbowitz	1955	Male	[address]	[address]  B	Regis Juan	1955	Male	[address]	[facility]	[id number]    Prescription Information      PDI Filter:    PDI	Current Rx	Drug Type	Rx Written	Rx Dispensed	Drug	Quantity	Days Supply	Prescriber Name	Prescriber JORDYN #	Payment Method	Dispenser  A	N	O	10/18/2023	10/18/2023	oxycodone hcl (ir) 5 mg tablet	12	2	Leah Owen)	[id number]	Medicare	[facility]  A	N	B	07/12/2023	07/12/2023	alprazolam 0.25 mg tablet	56	28	Regis Lazcano H [id number]	Medicare	[facility]  A	N	O	06/21/2023	06/21/2023	oxycodone hcl (ir) 5 mg tablet	10	3	Regis Lazcano H	[id number]	Medicare	[facility]  A	N	B	05/31/2023	05/31/2023	alprazolam 0.25 mg tablet	28	14	Regis Lazcano H	[id number]	Medicare	[facility]  A	N	O	05/05/2023	05/05/2023	oxycodone hcl (ir) 5 mg tablet	15	4	[facility]	[id number]	Medicare	[facility]  B	Y	O	12/20/2023	12/20/2023	diphenoxylate-atropine 2.5-0.025 mg tablet	180	30	Regis Lazcano H	[id number]	Medicare	[facility]

## 2023-12-23 NOTE — PHARMACOTHERAPY INTERVENTION NOTE - COMMENTS
Medication history is incomplete. Unable to verify patient's medication list with two sources. Discrepancies noted in provider handoff. Please call spectra [phone] if you have any questions.  Medication history is incomplete. Unable to verify patient's medication list with two sources. Discrepancies noted in provider handoff. Please call spectra [phone] if you have any questions.

## 2023-12-23 NOTE — H&P ADULT - HISTORY OF PRESENT ILLNESS
68M hx urothelial cancer (on Enfortumab Vedotin and Pembrolizumab last treatment 12/13, s/p L ureteral stent s/p exchange, mets to lung), R kidney stones (s/p R ureteroscopy w/ stone removal 8/2023), BPH, parkinson disease, hypothyroidism (thyroid cancer s/p partial thyroidectomy), presented to [facility] ED after MANISHA on outpatient labs [place] [facility] [place]. Patient reports watery brown diarrhea for 5 days starting 12/16, was given antidiarrheal med and diarrhea resolved 12/20 but has not had BM since. A/w poor PO appetite, bi/l LE edema, generalized weakness/fatigue for x months. No medication changes, NSAID use. Home meds includes Losartan. Urinary changes include urgency, frequency, and increased volume. He has required ureteral stents and Nephrostomy tubes over the past year, most recently had a stent 8/23, removed 9/23.    ***    In the ED, patient afebrile, hemodynamically stable with soft SBPs 80s-90s, saturating well on room air. Labs significant for WBC 12.5, Hgb 7.7, Na 130, K 3.1, BUN/SCr 27/3.93, UA and RVP neg. Imaging with CT AP showing L hydro, lung nodules increased in size, L+ R kidney masses, R kidney stones, and retroperitoneal LAD. Received IV tylenol, xanax, fent 25, morphine 2,  K repletion, and 2.5L IVF plus mIVF. 68M hx urothelial cancer (on Enfortumab Vedotin and Pembrolizumab last treatment 12/13, s/p L ureteral stent s/p exchange, mets to lung), R kidney stones (s/p R ureteroscopy w/ stone removal 8/2023), BPH, parkinson disease, hypothyroidism (thyroid cancer s/p partial thyroidectomy), presented to [facility] ED after MANISHA on outpatient labs at [facility] [place]. Patient reports watery brown diarrhea for 5 days starting 12/16, was given antidiarrheal med and diarrhea resolved 12/20 but has not had BM since. A/w poor PO appetite, bi/l LE edema, generalized weakness/fatigue for x months. No medication changes, NSAID use. Home meds includes Losartan. Urinary changes include urgency, frequency, and increased volume. He has required ureteral stents and Nephrostomy tubes over the past year, most recently had a stent 8/23, removed 9/23.    ***    In the ED, patient afebrile, hemodynamically stable with soft SBPs 80s-90s, saturating well on room air. Labs significant for WBC 12.5, Hgb 7.7, Na 130, K 3.1, BUN/SCr 27/3.93, UA and RVP neg. Imaging with CT AP showing L hydro, lung nodules increased in size, L+ R kidney masses, R kidney stones, and retroperitoneal LAD. Received IV tylenol, xanax, fent 25, morphine 2,  K repletion, and 2.5L IVF plus mIVF. 68M hx urothelial cancer (on Enfortumab Vedotin and Pembrolizumab last treatment 12/13, s/p L ureteral stent s/p exchange, mets to lung), R kidney stones (s/p R ureteroscopy w/ stone removal 8/2023), BPH, parkinson disease, hypothyroidism (thyroid cancer s/p partial thyroidectomy), presented to [facility] ED after MANISHA on outpatient labs at [facility] [place]. Patient reports watery brown diarrhea for 5 days starting 12/16, was given antidiarrheal med and diarrhea resolved 12/20 but has not had BM since. A/w poor PO appetite, bi/l LE edema, generalized weakness/fatigue for x months. No medication changes, NSAID use. Home meds includes Losartan. Urinary changes include frequency (2x/night --> 3-4x/night), and increased volume. He has required ureteral stents and Nephrostomy tubes over the past year, most recently had a stent 8/23, removed 9/23.    In the ED, patient afebrile, hemodynamically stable with soft SBPs 80s-90s, saturating well on room air. Labs significant for WBC 12.5, Hgb 7.7, Na 130, K 3.1, BUN/SCr 27/3.93, UA and RVP neg. Imaging with CT AP showing L hydro, lung nodules increased in size, L+ R kidney masses, R kidney stones, and retroperitoneal LAD. Received IV tylenol, xanax, fent 25, morphine 2,  K repletion, and 2.5L IVF plus mIVF. 68M hx urothelial cancer (on Enfortumab Vedotin and Pembrolizumab last treatment 12/13, s/p L ureteral stent s/p exchange, mets to lung), R kidney stones (s/p R ureteroscopy w/ stone removal 8/2023), BPH, parkinson disease, hypothyroidism (thyroid cancer s/p partial thyroidectomy), presented to [facility] ED after MANISHA on [place] labs [place] [facility] [place]. Patient reports watery brown diarrhea for 5 days starting 12/16, was given antidiarrheal med and diarrhea resolved 12/20 but has not had BM since. A/w poor PO appetite, bi/l LE edema, generalized weakness/fatigue for x months. No medication changes, NSAID use. Home meds includes Losartan. Urinary changes include frequency (2x/night --> 3-4x/night), and increased volume. He has required ureteral stents and Nephrostomy tubes over the past year, most recently had a stent 8/23, removed 9/23.    In the ED, patient afebrile, hemodynamically stable with soft SBPs 80s-90s, saturating well on room air. Labs significant for WBC 12.5, Hgb 7.7, Na 130, K 3.1, BUN/SCr 27/3.93, UA and RVP neg. Imaging with CT AP showing L hydro, lung nodules increased in size, L+ R kidney masses, R kidney stones, and retroperitoneal LAD. Received IV tylenol, xanax, fent 25, morphine 2,  K repletion, and 2.5L IVF plus mIVF. 68M hx urothelial cancer (on Enfortumab Vedotin and Pembrolizumab last treatment 12/13, s/p L ureteral stent s/p exchange, mets to lung), R kidney stones (s/p R ureteroscopy w/ stone removal 8/2023), BPH, parkinson disease, hypothyroidism (thyroid cancer s/p partial thyroidectomy), presented to [facility] ED [place] [facility] [place] reports watery brown diarrhea for 5 days starting 12/16, was given antidiarrheal med and diarrhea resolved 12/20 with return to normal BMs for him since. Diarrhea a/w poor PO appetite, bi/l LE edema (no hx CHF), generalized weakness/fatigue for x months. Recent UTI "10 days ago" and received antibiotics (levofloxacin noted in HIE rx 11/22/23). No other medication changes, NSAID use. Home meds includes Losartan. Urinary changes include frequency (2x/night --> 3-4x/night), and increased volume. He has required ureteral stents and Nephrostomy tubes over the past year, most recently had a stent 8/23, removed 9/23. Denies fevers/chills, cough, CP/SOB, N/V, abdominal pain, hematuria, dysuria.    In the ED, patient afebrile, hemodynamically stable with soft SBPs 80s-90s, saturating well on room air. Labs significant for WBC 12.5, Hgb 7.7, Na 130, K 3.1, BUN/SCr 27/3.93, UA and RVP neg. Imaging with CT AP showing L hydro, lung nodules increased in size, L+ R kidney masses, R kidney stones, and retroperitoneal LAD. Received IV tylenol, xanax, fent 25, morphine 2,  K repletion, and 2.5L IVF plus mIVF. 68M hx urothelial cancer (on Enfortumab Vedotin and Pembrolizumab last treatment 12/13, s/p L ureteral stent s/p exchange, mets to lung), R kidney stones (s/p R ureteroscopy w/ stone removal 8/2023), BPH, parkinson disease, hypothyroidism (thyroid cancer s/p partial thyroidectomy), presented to [facility] ED after MANISHA on outpatient labs at [facility] [place]. Patient reports watery brown diarrhea for 5 days starting 12/16, was given antidiarrheal med and diarrhea resolved 12/20 with return to normal BMs for him since. Diarrhea a/w poor PO appetite, bi/l LE edema (no hx CHF), generalized weakness/fatigue for x months. Recent UTI "10 days ago" and received antibiotics (levofloxacin noted in HIE rx 11/22/23). No other medication changes, NSAID use. Home meds includes Losartan. Urinary changes include frequency (2x/night --> 3-4x/night), and increased volume. He has required ureteral stents and Nephrostomy tubes over the past year, most recently had a stent 8/23, removed 9/23. Denies fevers/chills, cough, CP/SOB, N/V, abdominal pain, hematuria, dysuria.    In the ED, patient afebrile, hemodynamically stable with soft SBPs 80s-90s, saturating well on room air. Labs significant for WBC 12.5, Hgb 7.7, Na 130, K 3.1, BUN/SCr 27/3.93, UA and RVP neg. Imaging with CT AP showing L hydro, lung nodules increased in size, L+ R kidney masses, R kidney stones, and retroperitoneal LAD. Received IV tylenol, xanax, fent 25, morphine 2,  K repletion, and 2.5L IVF plus mIVF. 68M hx urothelial cancer (on Enfortumab Vedotin and Pembrolizumab last treatment 12/13, s/p L ureteral stent s/p exchange, mets to lung), R kidney stones (s/p R ureteroscopy w/ stone removal 8/2023), BPH, parkinson disease (s/p DBS), hypothyroidism (thyroid cancer s/p partial thyroidectomy), presented to [facility] ED after [place] [facility] [place]. Patient reports watery brown diarrhea for 5 days starting 12/16, was given antidiarrheal med and diarrhea resolved 12/20 with return to normal BMs for him since. Diarrhea a/w poor PO appetite, bi/l LE edema (no hx CHF), generalized weakness/fatigue for x months. Recent UTI "10 days ago" and received antibiotics (levofloxacin noted in HIE rx 11/22/23). No other medication changes, NSAID use. Home meds includes Losartan. Urinary changes include frequency (2x/night --> 3-4x/night), and increased volume. He has required ureteral stents and Nephrostomy tubes over the past year, most recently had a stent 8/23, removed 9/23. Denies fevers/chills, cough, CP/SOB, N/V, abdominal pain, hematuria, dysuria.    In the ED, patient afebrile, hemodynamically stable with soft SBPs 80s-90s, saturating well on room air. Labs significant for WBC 12.5, Hgb 7.7, Na 130, K 3.1, BUN/SCr 27/3.93, UA and RVP neg. Imaging with CT AP showing L hydro, lung nodules increased in size, L+ R kidney masses, R kidney stones, and retroperitoneal LAD. Received IV tylenol, xanax, fent 25, morphine 2,  K repletion, and 2.5L IVF plus mIVF. 68M hx urothelial cancer (on Enfortumab Vedotin and Pembrolizumab last treatment 12/13, s/p L ureteral stent s/p exchange, mets to lung), R kidney stones (s/p R ureteroscopy w/ stone removal 8/2023), BPH, parkinson disease (s/p DBS), hypothyroidism (thyroid cancer s/p partial thyroidectomy), presented to [facility] ED after MANISHA on outpatient labs at [facility] [place]. Patient reports watery brown diarrhea for 5 days starting 12/16, was given antidiarrheal med and diarrhea resolved 12/20 with return to normal BMs for him since. Diarrhea a/w poor PO appetite, bi/l LE edema (no hx CHF), generalized weakness/fatigue for x months. Recent UTI "10 days ago" and received antibiotics (levofloxacin noted in HIE rx 11/22/23). No other medication changes, NSAID use. Home meds includes Losartan. Urinary changes include frequency (2x/night --> 3-4x/night), and increased volume. He has required ureteral stents and Nephrostomy tubes over the past year, most recently had a stent 8/23, removed 9/23. Denies fevers/chills, cough, CP/SOB, N/V, abdominal pain, hematuria, dysuria.    In the ED, patient afebrile, hemodynamically stable with soft SBPs 80s-90s, saturating well on room air. Labs significant for WBC 12.5, Hgb 7.7, Na 130, K 3.1, BUN/SCr 27/3.93, UA and RVP neg. Imaging with CT AP showing L hydro, lung nodules increased in size, L+ R kidney masses, R kidney stones, and retroperitoneal LAD. Received IV tylenol, xanax, fent 25, morphine 2,  K repletion, and 2.5L IVF plus mIVF.

## 2023-12-23 NOTE — H&P ADULT - PROBLEM SELECTOR PLAN 4
> cont home carbidope-levodopa # Hyponatremia  # Hypokalemia  Both i/s/o diarrhea, poor PO intake.  > CTM on BMP, avoid overcorrection with fluids  > replete lytes PRN

## 2023-12-23 NOTE — H&P ADULT - ASSESSMENT
68M hx urothelial cancer (on Enfortumab Vedotin and Pembrolizumab last treatment 12/13, s/p L ureteral stent s/p exchange, mets to lung), R kidney stones (s/p R ureteroscopy w/ stone removal 8/2023), BPH, parkinson disease, hypothyroidism (thyroid cancer s/p partial thyroidectomy), presented for MANISHA on outpatient labs i/s/o diarrhea and poor PO intake. IVF resuscitation and undergoing sepsis w/u. Nephrology and Urology consulted, no urgent intervention needed on L hydro seen on imaging 2/2 L renal mass. 68M hx urothelial cancer (on Enfortumab Vedotin and Pembrolizumab last treatment 12/13, s/p L ureteral stent s/p exchange, mets to lung), R kidney stones (s/p R ureteroscopy w/ stone removal 8/2023), BPH, parkinson disease (s/p DBS), hypothyroidism (thyroid cancer s/p partial thyroidectomy), presented for MANISHA on outpatient labs i/s/o diarrhea and poor PO intake. IVF resuscitation and undergoing sepsis w/u. Nephrology and Urology consulted, no urgent intervention needed on L hydro seen on imaging 2/2 L renal mass.

## 2023-12-23 NOTE — PROGRESS NOTE ADULT - PROBLEM SELECTOR PLAN 1
Likely 2/2 hypovolemia due to poor PO intake and diarrhea, less likely sepsis/GI or UTI infection. Recent course of levofloxacin outpatient for UTI.  - hypovolemia, improved with hydration   - UA, RVP neg  > f/u CXR, noted   > f/u Bcx, noted

## 2023-12-23 NOTE — CONSULT NOTE ADULT - SUBJECTIVE AND OBJECTIVE BOX
HPI:  68M hx urothelial cancer (on Enfortumab Vedotin and Pembrolizumab last treatment 12/13, s/p L ureteral stent s/p exchange, mets to lung), R kidney stones (s/p R ureteroscopy w/ stone removal 8/2023), BPH, parkinson disease (s/p DBS), hypothyroidism (thyroid cancer s/p partial thyroidectomy), presented to [facility] ED after MANISHA on outpatient labs at [place]. Patient reports watery brown diarrhea for 5 days starting 12/16, was given antidiarrheal med and diarrhea resolved 12/20 with return to normal BMs for him since. Diarrhea a/w poor PO appetite, bi/l LE edema (no hx CHF), generalized weakness/fatigue for x months. Recent UTI "10 days ago" and received antibiotics (levofloxacin noted in HIE rx 11/22/23). No other medication changes, NSAID use. Home meds includes Losartan. Urinary changes include frequency (2x/night --> 3-4x/night), and increased volume. He has required ureteral stents and Nephrostomy tubes over the past year, most recently had a stent 8/23, removed 9/23. Denies fevers/chills, cough, CP/SOB, N/V, abdominal pain, hematuria, dysuria.    In the ED, patient afebrile, hemodynamically stable with soft SBPs 80s-90s, saturating well on room air. Labs significant for WBC 12.5, Hgb 7.7, Na 130, K 3.1, BUN/SCr 27/3.93, UA and RVP neg. Imaging with CT AP showing L hydro, lung nodules increased in size, L+ R kidney masses, R kidney stones, and retroperitoneal LAD. Received IV tylenol, xanax, fent 25, morphine 2,  K repletion, and 2.5L IVF plus mIVF. (23 Dec 2023 03:31)      PAST MEDICAL & SURGICAL HISTORY:  Parkinson disease      Hypertension      Hypothyroid      Anxiety      Rectal mass      Osteoarthritis      Edema leg      BALJINDER (obstructive sleep apnea)  based on stop bang score of 6      Renal cell carcinoma      Urothelial cancer      Bell's palsy      Hypomagnesemia      History of hip replacement  right      H/O resection of rectum  large mass, was non-cancerous      H/O ileostomy  reversed 12/2014      H/O laminectomy  lumbar x2      S/P hernia repair  incisional      History of reversal of ileostomy      S/P deep brain stimulator placement          Allergies    No Known Allergies    Intolerances        MEDICATIONS  (STANDING):  aspirin enteric coated 81 milliGRAM(s) Oral daily  atorvastatin 20 milliGRAM(s) Oral at bedtime  carbidopa/levodopa  25/250 0.5 Tablet(s) Oral four times a day  heparin   Injectable 5000 Unit(s) SubCutaneous every 8 hours  lactated ringers. 1000 milliLiter(s) (100 mL/Hr) IV Continuous <Continuous>  levothyroxine 75 MICROGram(s) Oral daily  magnesium oxide 400 milliGRAM(s) Oral three times a day with meals  tamsulosin 0.8 milliGRAM(s) Oral at bedtime    MEDICATIONS  (PRN):  acetaminophen     Tablet .. 650 milliGRAM(s) Oral every 6 hours PRN Temp greater or equal to 38C (100.4F), Mild Pain (1 - 3)  aluminum hydroxide/magnesium hydroxide/simethicone Suspension 30 milliLiter(s) Oral every 4 hours PRN Dyspepsia  melatonin 3 milliGRAM(s) Oral at bedtime PRN Insomnia  ondansetron Injectable 4 milliGRAM(s) IV Push every 8 hours PRN Nausea and/or Vomiting  senna 2 Tablet(s) Oral at bedtime PRN Constipation      FAMILY HISTORY:      SOCIAL HISTORY: No EtOH, no tobacco    REVIEW OF SYSTEMS:    CONSTITUTIONAL: No weakness, fevers or chills  EYES/ENT: No visual changes;  No vertigo or throat pain   NECK: No pain or stiffness  RESPIRATORY: No cough, wheezing, hemoptysis; No shortness of breath  CARDIOVASCULAR: No chest pain or palpitations  GASTROINTESTINAL: No abdominal or epigastric pain. No nausea, vomiting, or hematemesis; No diarrhea or constipation. No melena or hematochezia.  GENITOURINARY: No dysuria, frequency or hematuria  NEUROLOGICAL: No numbness or weakness  SKIN: No itching, burning, rashes, or lesions   All other review of systems is negative unless indicated above.        T(F): 98.1 (12-23-23 @ 10:07), Max: 98.1 (12-23-23 @ 10:07)  HR: 77 (12-23-23 @ 10:07)  BP: 100/50 (12-23-23 @ 10:07)  RR: 22 (12-23-23 @ 10:07)  SpO2: 95% (12-23-23 @ 10:07)  Wt(kg): --    GENERAL: NAD, well-developed  HEAD:  Atraumatic, Normocephalic  EYES: EOMI, PERRLA, conjunctiva and sclera clear  NECK: Supple, No JVD  CHEST/LUNG: Clear to auscultation bilaterally; No wheeze  HEART: Regular rate and rhythm; No murmurs, rubs, or gallops  ABDOMEN: Soft, Nontender, Nondistended; Bowel sounds present  EXTREMITIES:  2+ Peripheral Pulses, No clubbing, cyanosis, or edema  NEUROLOGY: non-focal  SKIN: No rashes or lesions                          7.4    9.57  )-----------( 338      ( 23 Dec 2023 13:04 )             23.7       12-23    134<L>  |  101  |  26<H>  ----------------------------<  85  3.0<L>   |  15<L>  |  2.76<H>    Ca    7.2<L>      23 Dec 2023 06:52  Phos  3.3     12-23  Mg     1.80     12-23    TPro  6.0  /  Alb  2.6<L>  /  TBili  0.8  /  DBili  x   /  AST  24  /  ALT  7   /  AlkPhos  86  12-23      Magnesium: 1.80 mg/dL (12-23 @ 06:52)  Phosphorus: 3.3 mg/dL (12-23 @ 06:52)  Magnesium: 1.60 mg/dL (12-22 @ 14:00)  Phosphorus: 3.1 mg/dL (12-22 @ 14:00)  Uric Acid: 12.3 mg/dL (12-22 @ 14:00)          Clean Catch Clean Catch (Midstream)  11-22 @ 05:37   <10,000 CFU/mL Normal Urogenital Nissa  --  --       HPI:  68M hx urothelial cancer (on Enfortumab Vedotin and Pembrolizumab last treatment 12/13, s/p L ureteral stent s/p exchange, mets to lung), R kidney stones (s/p R ureteroscopy w/ stone removal 8/2023), BPH, parkinson disease (s/p DBS), hypothyroidism (thyroid cancer s/p partial thyroidectomy), presented to [facility] ED [place] [facility] [place]. Patient reports watery brown diarrhea for 5 days starting 12/16, was given antidiarrheal med and diarrhea resolved 12/20 with return to normal BMs for him since. Diarrhea a/w poor PO appetite, bi/l LE edema (no hx CHF), generalized weakness/fatigue for x months. Recent UTI "10 days ago" and received antibiotics (levofloxacin noted in HIE rx 11/22/23). No other medication changes, NSAID use. Home meds includes Losartan. Urinary changes include frequency (2x/night --> 3-4x/night), and increased volume. He has required ureteral stents and Nephrostomy tubes over the past year, most recently had a stent 8/23, removed 9/23. Denies fevers/chills, cough, CP/SOB, N/V, abdominal pain, hematuria, dysuria.    In the ED, patient afebrile, hemodynamically stable with soft SBPs 80s-90s, saturating well on room air. Labs significant for WBC 12.5, Hgb 7.7, Na 130, K 3.1, BUN/SCr 27/3.93, UA and RVP neg. Imaging with CT AP showing L hydro, lung nodules increased in size, L+ R kidney masses, R kidney stones, and retroperitoneal LAD. Received IV tylenol, xanax, fent 25, morphine 2,  K repletion, and 2.5L IVF plus mIVF. (23 Dec 2023 03:31)      PAST MEDICAL & SURGICAL HISTORY:  Parkinson disease      Hypertension      Hypothyroid      Anxiety      Rectal mass      Osteoarthritis      Edema leg      BALJINDER (obstructive sleep apnea)  based on stop bang score of 6      Renal cell carcinoma      Urothelial cancer      Bell's palsy      Hypomagnesemia      History of hip replacement  right      H/O resection of rectum  large mass, was non-cancerous      H/O ileostomy  reversed 12/2014      H/O laminectomy  lumbar x2      S/P hernia repair  incisional      History of reversal of ileostomy      S/P deep brain stimulator placement          Allergies    No Known Allergies    Intolerances        MEDICATIONS  (STANDING):  aspirin enteric coated 81 milliGRAM(s) Oral daily  atorvastatin 20 milliGRAM(s) Oral at bedtime  carbidopa/levodopa  25/250 0.5 Tablet(s) Oral four times a day  heparin   Injectable 5000 Unit(s) SubCutaneous every 8 hours  lactated ringers. 1000 milliLiter(s) (100 mL/Hr) IV Continuous <Continuous>  levothyroxine 75 MICROGram(s) Oral daily  magnesium oxide 400 milliGRAM(s) Oral three times a day with meals  tamsulosin 0.8 milliGRAM(s) Oral at bedtime    MEDICATIONS  (PRN):  acetaminophen     Tablet .. 650 milliGRAM(s) Oral every 6 hours PRN Temp greater or equal to 38C (100.4F), Mild Pain (1 - 3)  aluminum hydroxide/magnesium hydroxide/simethicone Suspension 30 milliLiter(s) Oral every 4 hours PRN Dyspepsia  melatonin 3 milliGRAM(s) Oral at bedtime PRN Insomnia  ondansetron Injectable 4 milliGRAM(s) IV Push every 8 hours PRN Nausea and/or Vomiting  senna 2 Tablet(s) Oral at bedtime PRN Constipation      FAMILY HISTORY:      SOCIAL HISTORY: No EtOH, no tobacco    REVIEW OF SYSTEMS:    CONSTITUTIONAL: No weakness, fevers or chills  EYES/ENT: No visual changes;  No vertigo or throat pain   NECK: No pain or stiffness  RESPIRATORY: No cough, wheezing, hemoptysis; No shortness of breath  CARDIOVASCULAR: No chest pain or palpitations  GASTROINTESTINAL: No abdominal or epigastric pain. No nausea, vomiting, or hematemesis; No diarrhea or constipation. No melena or hematochezia.  GENITOURINARY: No dysuria, frequency or hematuria  NEUROLOGICAL: No numbness or weakness  SKIN: No itching, burning, rashes, or lesions   All other review of systems is negative unless indicated above.        T(F): 98.1 (12-23-23 @ 10:07), Max: 98.1 (12-23-23 @ 10:07)  HR: 77 (12-23-23 @ 10:07)  BP: 100/50 (12-23-23 @ 10:07)  RR: 22 (12-23-23 @ 10:07)  SpO2: 95% (12-23-23 @ 10:07)  Wt(kg): --    GENERAL: NAD, well-developed  HEAD:  Atraumatic, Normocephalic  EYES: EOMI, PERRLA, conjunctiva and sclera clear  NECK: Supple, No JVD  CHEST/LUNG: Clear to auscultation bilaterally; No wheeze  HEART: Regular rate and rhythm; No murmurs, rubs, or gallops  ABDOMEN: Soft, Nontender, Nondistended; Bowel sounds present  EXTREMITIES:  2+ Peripheral Pulses, No clubbing, cyanosis, or edema  NEUROLOGY: non-focal  SKIN: No rashes or lesions                          7.4    9.57  )-----------( 338      ( 23 Dec 2023 13:04 )             23.7       12-23    134<L>  |  101  |  26<H>  ----------------------------<  85  3.0<L>   |  15<L>  |  2.76<H>    Ca    7.2<L>      23 Dec 2023 06:52  Phos  3.3     12-23  Mg     1.80     12-23    TPro  6.0  /  Alb  2.6<L>  /  TBili  0.8  /  DBili  x   /  AST  24  /  ALT  7   /  AlkPhos  86  12-23      Magnesium: 1.80 mg/dL (12-23 @ 06:52)  Phosphorus: 3.3 mg/dL (12-23 @ 06:52)  Magnesium: 1.60 mg/dL (12-22 @ 14:00)  Phosphorus: 3.1 mg/dL (12-22 @ 14:00)  Uric Acid: 12.3 mg/dL (12-22 @ 14:00)          Clean Catch Clean Catch (Midstream)  11-22 @ 05:37   <10,000 CFU/mL Normal Urogenital Nissa  --  --

## 2023-12-23 NOTE — PATIENT PROFILE ADULT - FUNCTIONAL ASSESSMENT - BASIC MOBILITY 6.
3-calculated by average/Not able to assess (calculate score using [facility] averaging method)  3-calculated by average/Not able to assess (calculate score using [facility] averaging method)

## 2023-12-23 NOTE — H&P ADULT - PROBLEM SELECTOR PLAN 8
> senna    DVT PPx: SQH  Diet: S+BS  Code:   Dispo: PT/OT Pending Hospital Course  Communication:     Discharge information:  Pharmacy -   PCP - 2/2 chronic disease / malignancy  > CTM on CBC qD  > T+S; transfusion goal >7

## 2023-12-23 NOTE — PATIENT PROFILE ADULT - PATIENT'S SEXUAL ORIENTATION
PRINCIPAL DISCHARGE DIAGNOSIS  Diagnosis: Colon polyp  Assessment and Plan of Treatment:      Heterosexual

## 2023-12-23 NOTE — H&P ADULT - ATTENDING COMMENTS
This is a 69 y/o M with pmhx of urothelial cancer, parkinson's disease, BPH, presented to the ED for new onset weakness, urinary frequency and MANISHA. The patient told me that he was having urinary retention for 1 day (however had told the resident that he had increased urinary frequency). He also stated that he has been having generalized weakness for the last week. Denies dysuria or suprapubic tenderness. He also has diarrhea for 1 week and has 3-4 episodes each day, weatery and foul smelling. Hx of recent abx use for UTI. The patient also was told that he has MANISHA and hypokalemia on labs, and recommended to go to the ED for evaluation.    Physical exam  PHYSICAL EXAM:  VITALS: Vital Signs Last 24 Hrs  T(C): 36.4 (23 Dec 2023 02:00), Max: 36.4 (22 Dec 2023 16:37)  T(F): 97.5 (23 Dec 2023 02:00), Max: 97.5 (22 Dec 2023 16:37)  HR: 68 (23 Dec 2023 02:00) (63 - 71)  BP: 95/49 (23 Dec 2023 02:00) (88/47 - 97/56)  BP(mean): 64 (23 Dec 2023 02:00) (60 - 68)  RR: 18 (23 Dec 2023 02:00) (16 - 18)  SpO2: 100% (23 Dec 2023 02:00) (96% - 100%)    Parameters below as of 23 Dec 2023 02:00  Patient On (Oxygen Delivery Method): room air      GENERAL: NAD, comfortable at bedside  HEAD:  Atraumatic, Normocephalic  EYES: EOMI, PERRL, conjunctiva and sclera clear  ENT: Moist Mucus Membranes present, no ulcers appreciated  NECK: Supple, No JVD  CHEST/LUNG: Clear to auscultation bilaterally; No wheezes, rales or rhonchi, no accessory muscle use, + battery device on the R chest  HEART: Regular rate and rhythm; No murmurs, rubs, or gallops, (+)S1, S2  ABDOMEN: Soft, Nontender, + distended, noted to have a large, palpable mass on the L upper quadrant, Normal Bowel sounds   EXTREMITIES: No clubbing, cyanosis, +2 edema in LE b/l  PSYCH: normal mood and affect  NEUROLOGY: AAOx3, non-focal  SKIN: No rashes or lesions    Labs:                        7.7    12.51 )-----------( 326      ( 22 Dec 2023 14:00 )             24.2       12    130<L>  |  96<L>  |  27<H>  ----------------------------<  121<H>  3.1<L>   |  19<L>  |  3.93<H>    Ca    7.2<L>      22 Dec 2023 14:00  Phos  3.1       Mg     1.60         TPro  6.4  /  Alb  2.7<L>  /  TBili  0.8  /  DBili  x   /  AST  26  /  ALT  <5  /  AlkPhos  79                  14:00 - VBG - pH: 7.35  | pCO2: 35    | pO2: 48    | Lactate: 1.7        Urinalysis Basic - ( 22 Dec 2023 16:30 )    Color: Yellow / Appearance: Cloudy / S.016 / pH: x  Gluc: x / Ketone: Trace mg/dL  / Bili: Negative / Urobili: 0.2 mg/dL   Blood: x / Protein: 100 mg/dL / Nitrite: Negative   Leuk Esterase: Small / RBC: 11 /HPF / WBC 13 /HPF   Sq Epi: x / Non Sq Epi: 8 /HPF / Bacteria: Negative /HPF      EKG: As per my read - NSR no ST changes QTc 477 ms    CT: Interval progression of metastatic disease to the chest since 2023.  Large heterogeneous mass replacing the left kidney, consistent with known   neoplasm. Left hydroureteronephrosis to the level of a mass in the   urinary bladder. Likely additional disease within the deep pelvis. A   heterogeneous solid mass in the right kidney.      The patient presented for new onset weakness, MANISHA and hydronephrosis. CT shows a large L kidney malignancy with hydronephrosis, which can be contributory to his MANISHA. Will call IR for nephrostomy tube. The patient had a hx of UTI treatment recently, and is now complaining of diarrhea. Will start PO vanco empirically due to hx of chemotherapy use in addition to leukocytosis. C diff work up and GI PCR pending. Heme/onc consult for evaluation of chemo induced colitis. The patient also does not have UTI symptoms at this time, and UA does not show bacteria, can hold off giving abx for now. Procal pending. The patient has a brain stimulator device for his parkinsons, therefore cannot get MRI for any reason until cleared by the MRI manager. This is a 67 y/o M with pmhx of urothelial cancer, parkinson's disease, BPH, presented to the ED for new onset weakness, urinary frequency and MANISHA. The patient told me that he was having urinary retention for 1 day (however had told the resident that he had increased urinary frequency). He also stated that he has been having generalized weakness for the last week. Denies dysuria or suprapubic tenderness. He also has diarrhea for 1 week and has 3-4 episodes each day, weatery and foul smelling. Hx of recent abx use for UTI. The patient also was told that he has MANISHA and hypokalemia on labs, and recommended to go to the ED for evaluation.    Physical exam  PHYSICAL EXAM:  VITALS: Vital Signs Last 24 Hrs  T(C): 36.4 (23 Dec 2023 02:00), Max: 36.4 (22 Dec 2023 16:37)  T(F): 97.5 (23 Dec 2023 02:00), Max: 97.5 (22 Dec 2023 16:37)  HR: 68 (23 Dec 2023 02:00) (63 - 71)  BP: 95/49 (23 Dec 2023 02:00) (88/47 - 97/56)  BP(mean): 64 (23 Dec 2023 02:00) (60 - 68)  RR: 18 (23 Dec 2023 02:00) (16 - 18)  SpO2: 100% (23 Dec 2023 02:00) (96% - 100%)    Parameters below as of 23 Dec 2023 02:00  Patient On (Oxygen Delivery Method): room air      GENERAL: NAD, comfortable at bedside  HEAD:  Atraumatic, Normocephalic  EYES: EOMI, PERRL, conjunctiva and sclera clear  ENT: Moist Mucus Membranes present, no ulcers appreciated  NECK: Supple, No JVD  CHEST/LUNG: Clear to auscultation bilaterally; No wheezes, rales or rhonchi, no accessory muscle use, + battery device on the R chest  HEART: Regular rate and rhythm; No murmurs, rubs, or gallops, (+)S1, S2  ABDOMEN: Soft, Nontender, + distended, noted to have a large, palpable mass on the L upper quadrant, Normal Bowel sounds   EXTREMITIES: No clubbing, cyanosis, +2 edema in LE b/l  PSYCH: normal mood and affect  NEUROLOGY: AAOx3, non-focal  SKIN: No rashes or lesions    Labs:                        7.7    12.51 )-----------( 326      ( 22 Dec 2023 14:00 )             24.2       12    130<L>  |  96<L>  |  27<H>  ----------------------------<  121<H>  3.1<L>   |  19<L>  |  3.93<H>    Ca    7.2<L>      22 Dec 2023 14:00  Phos  3.1       Mg     1.60         TPro  6.4  /  Alb  2.7<L>  /  TBili  0.8  /  DBili  x   /  AST  26  /  ALT  <5  /  AlkPhos  79                  14:00 - VBG - pH: 7.35  | pCO2: 35    | pO2: 48    | Lactate: 1.7        Urinalysis Basic - ( 22 Dec 2023 16:30 )    Color: Yellow / Appearance: Cloudy / S.016 / pH: x  Gluc: x / Ketone: Trace mg/dL  / Bili: Negative / Urobili: 0.2 mg/dL   Blood: x / Protein: 100 mg/dL / Nitrite: Negative   Leuk Esterase: Small / RBC: 11 /HPF / WBC 13 /HPF   Sq Epi: x / Non Sq Epi: 8 /HPF / Bacteria: Negative /HPF      EKG: As per my read - NSR no ST changes QTc 477 ms    CT: Interval progression of metastatic disease to the chest since 2023.  Large heterogeneous mass replacing the left kidney, consistent with known   neoplasm. Left hydroureteronephrosis to the level of a mass in the   urinary bladder. Likely additional disease within the deep pelvis. A   heterogeneous solid mass in the right kidney.      The patient presented for new onset weakness, MANISHA and hydronephrosis. CT shows a large L kidney malignancy with hydronephrosis, which can be contributory to his MANISHA. Will call IR for nephrostomy tube. The patient had a hx of UTI treatment recently, and is now complaining of diarrhea. Will start PO vanco empirically due to hx of chemotherapy use in addition to leukocytosis. C diff work up and GI PCR pending. Heme/onc consult for evaluation of chemo induced colitis. The patient also does not have UTI symptoms at this time, and UA does not show bacteria, can hold off giving abx for now. Procal pending. The patient has a brain stimulator device for his parkinsons, therefore cannot get MRI for any reason until cleared by the MRI manager.    As per current hospital policy, this patient will be followed by the private attending and to take over the case in the morning, and assume complete responsibility by Dr. Agustin as listed on the attending of record as above on the chart.

## 2023-12-23 NOTE — CONSULT NOTE ADULT - ASSESSMENT
Interventional Radiology    Evaluate for Procedure: L PCN placement     HPI: 68y Male with metastatic urothelial cancer presented to ED with MANISHA. IR was consulted for left PCN placement as there was concern for left hydronephrosis.    Allergies: No Known Allergies    Medications (Abx/Cardiac/Anticoagulation/Blood Products)    heparin   Injectable: 5000 Unit(s) SubCutaneous (12-23 @ 05:16)  vancomycin    Solution: 125 milliGRAM(s) Oral (12-23 @ 06:29)    Data:    T(C): 36.6  HR: 74  BP: 103/56  RR: 22  SpO2: 95%    -WBC 12.51 / HgB 7.7 / Hct 24.2 / Plt 326  -Na 130 / Cl 96 / BUN 27 / Glucose 121  -K 3.1 / CO2 19 / Cr 3.93  -ALT <5 / Alk Phos 79 / T.Bili 0.8  -INR 1.12 / PTT 32.9      Radiology: Relevant imaging reviewed    Assessment/Plan:   68y Male with metastatic urothelial cancer presented to ED with MANISHA. IR was consulted for left PCN placement as there was concern for left hydronephrosis.    --CT abd/pelvis 12/22 reviewed, L kidney and ureter replaced by mass. The left renal mass is similar compared to prior study 11/22/23, and heterogenous, making it difficult to distinguish necrotic mass from hydro. The left kidney mass results in a non functioning kidney and unlikely contributing to MANISHA. The right kidney is unchanged.  -- As per nephrology note, MANISHA most likely secondary to hypovolemia  -- No role for L PCN placement   -- Discussed with Dr. Romero

## 2023-12-23 NOTE — CONSULT NOTE ADULT - ASSESSMENT
68M hx urothelial cancer (on Enfortumab Vedotin and Pembrolizumab last treatment 12/13, s/p L ureteral stent s/p exchange, mets to lung), R kidney stones (s/p R ureteroscopy w/ stone removal 8/2023), BPH, parkinson disease (s/p DBS), hypothyroidism (thyroid cancer s/p partial thyroidectomy), presented to [facility] ED after MANISHA on [place] labs [place] [facility] [place].       1. Metastatic Urothelial Carcinoma   - s/p gem/cis x4 cycles with interval increase in LNs, started q3wk pembro on 4/12/23  - s/p left renal mass biopsy showing SCC/urothelial carcinoma- IMPACT testing with HRAS G12V and TERT mutation    - was on q6wk pembro and transitioned to EV + Pembro- EV given on 12/6/23 and 12/13/23 with plan to resume combination treatment on 12/27/23   - most recent PET imaging prior to EV showing worsening b/l pulmonary mets with renal mass and LAD.   - CT A/P showing interval POD to the chest- recently had EV added to regimen for known worsening pulmonary mets     2. Papillary Thyroid Carcinoma   - level 3 LN biopsy confirmed s/p partial thyroidectomy and left modified neck dissection with Dr. Della Colby on 10/17/2023- 2/26 LN metastatic papillary  - pathology revealing mixed type with medullary component- sporadic   - on levothyroxine 125mcg which he should continue   - monitoring TSH, Free T4, calcitonin and CEA with [facility] [place] Dr. Penny Dailey which he can follow up after discharge    3. Hydronephrosis   4. Acute Kidney Injury   - prior RIGHT Hydro with conversion from NUT to PCN and internalization with Urologist Dr Nicholas Hernandez   - s/p lithotripsy to right renal stone (nonobstructing) and s/p stent removal   - Now presenting with worsening creatinine and LEFT hydronephrosis on imaging with large heterogenous mass replacing the left kidney and hydroureteronephrosis to the level of a mass in the urinary bladder with additional disease deep in pelvis and heterogenous solid mass in the right kidney   - IR consulted for PCN tube placement however given the mass has replaced the left kidney unlikely to be contributing to MANISHA  - appreciate nephrology recs- MANISHA likely pre-renal hypovolemia related- has had diarrhea for 5 days  - send stool studies, cdiff    - continue with IVF    5. Anemia   - multifactorial in the setting of malignancy, blood loss   - sending iron studies, b12/folate   - transfuse to maintain >7       Tomy Jordan MD   Hematology/Oncology   [facility]  68M hx urothelial cancer (on Enfortumab Vedotin and Pembrolizumab last treatment 12/13, s/p L ureteral stent s/p exchange, mets to lung), R kidney stones (s/p R ureteroscopy w/ stone removal 8/2023), BPH, parkinson disease (s/p DBS), hypothyroidism (thyroid cancer s/p partial thyroidectomy), presented to [facility] ED after MANISHA on outpatient labs at [facility] [place].       1. Metastatic Urothelial Carcinoma   - s/p gem/cis x4 cycles with interval increase in LNs, started q3wk pembro on 4/12/23  - s/p left renal mass biopsy showing SCC/urothelial carcinoma- IMPACT testing with HRAS G12V and TERT mutation    - was on q6wk pembro and transitioned to EV + Pembro- EV given on 12/6/23 and 12/13/23 with plan to resume combination treatment on 12/27/23   - most recent PET imaging prior to EV showing worsening b/l pulmonary mets with renal mass and LAD.   - CT A/P showing interval POD to the chest- recently had EV added to regimen for known worsening pulmonary mets     2. Papillary Thyroid Carcinoma   - level 3 LN biopsy confirmed s/p partial thyroidectomy and left modified neck dissection with Dr. Della Colby on 10/17/2023- 2/26 LN metastatic papillary  - pathology revealing mixed type with medullary component- sporadic   - on levothyroxine 125mcg which he should continue   - monitoring TSH, Free T4, calcitonin and [place] [facility] [place]iley which he can follow up after discharge    3. Hydronephrosis   4. Acute Kidney Injury   - prior RIGHT Hydro with conversion from NUT to PCN and internalization with Urologist Dr Nicholas Hernandez   - s/p lithotripsy to right renal stone (nonobstructing) and s/p stent removal   - Now presenting with worsening creatinine and LEFT hydronephrosis on imaging with large heterogenous mass replacing the left kidney and hydroureteronephrosis to the level of a mass in the urinary bladder with additional disease deep in pelvis and heterogenous solid mass in the right kidney   - IR consulted for PCN tube placement however given the mass has replaced the left kidney unlikely to be contributing to MANISHA  - appreciate nephrology recs- MANISHA likely pre-renal hypovolemia related- has had diarrhea for 5 days  - send stool studies, cdiff    - continue with IVF    5. Anemia   - multifactorial in the setting of malignancy, blood loss   - sending iron studies, b12/folate   - transfuse to maintain >7       Tomy Jordan MD   Hematology/Oncology   [facility]  68M hx urothelial cancer (on Enfortumab Vedotin and Pembrolizumab last treatment 12/13, s/p L ureteral stent s/p exchange, mets to lung), R kidney stones (s/p R ureteroscopy w/ stone removal 8/2023), BPH, parkinson disease (s/p DBS), hypothyroidism (thyroid cancer s/p partial thyroidectomy), presented to [facility] ED after MANISHA on [place] labs [place] [facility] [place].       1. Metastatic Urothelial Carcinoma   - s/p gem/cis x4 cycles with interval increase in LNs, started q3wk pembro on 4/12/23  - s/p left renal mass biopsy showing SCC/urothelial carcinoma- IMPACT testing with HRAS G12V and TERT mutation    - was on q6wk pembro and transitioned to EV + Pembro- EV given on 12/6/23 and 12/13/23 with plan to resume combination treatment on 12/27/23   - most recent PET imaging prior to EV showing worsening b/l pulmonary mets with renal mass and LAD.   - CT A/P showing interval POD to the chest- recently had EV added to regimen for known worsening pulmonary mets   - no plans for inpatient treatment   - [place] [facility] [place] Regis [place]azcano     2. Papillary Thyroid Carcinoma   - level 3 LN biopsy confirmed s/p partial thyroidectomy and left modified neck dissection with Dr. Della Colby on 10/17/2023- 2/26 LN metastatic papillary  - pathology revealing mixed type with medullary component- sporadic   - on levothyroxine 125mcg which he should continue   - monitoring TSH, Free T4, calcitonin and [place] [facility] [place] Dailey which he can follow up after discharge    3. Hydronephrosis   4. Acute Kidney Injury   - prior RIGHT Hydro with conversion from NUT to PCN and internalization with Urologist Dr Nicholas Hernandez   - s/p lithotripsy to right renal stone (nonobstructing) and s/p stent removal   - Now presenting with worsening creatinine and LEFT hydronephrosis on imaging with large heterogenous mass replacing the left kidney and hydroureteronephrosis to the level of a mass in the urinary bladder with additional disease deep in pelvis and heterogenous solid mass in the right kidney   - IR consulted for PCN tube placement however given the mass has replaced the left kidney unlikely to be contributing to MANISHA  - appreciate nephrology recs- MANISHA likely pre-renal hypovolemia related- has had diarrhea for 5 days  - send stool studies, cdiff    - continue with IVF    5. Anemia   - multifactorial in the setting of malignancy, blood loss   - sending iron studies, b12/folate   - transfuse to maintain >7       Tomy Jordan MD   Hematology/Oncology   [facility]  68M hx urothelial cancer (on Enfortumab Vedotin and Pembrolizumab last treatment 12/13, s/p L ureteral stent s/p exchange, mets to lung), R kidney stones (s/p R ureteroscopy w/ stone removal 8/2023), BPH, parkinson disease (s/p DBS), hypothyroidism (thyroid cancer s/p partial thyroidectomy), presented to [facility] ED after MANISHA on outpatient labs at [facility] [place].       1. Metastatic Urothelial Carcinoma   - s/p gem/cis x4 cycles with interval increase in LNs, started q3wk pembro on 4/12/23  - s/p left renal mass biopsy showing SCC/urothelial carcinoma- IMPACT testing with HRAS G12V and TERT mutation    - was on q6wk pembro and transitioned to EV + Pembro- EV given on 12/6/23 and 12/13/23 with plan to resume combination treatment on 12/27/23   - most recent PET imaging prior to EV showing worsening b/l pulmonary mets with renal mass and LAD.   - CT A/P showing interval POD to the chest- recently had EV added to regimen for known worsening pulmonary mets   - no plans for inpatient treatment   - follow up at [facility] [place] after [place] Dr Regis Lazcano     2. Papillary Thyroid Carcinoma   - level 3 LN biopsy confirmed s/p partial thyroidectomy and left modified neck dissection with Dr. Della Colby on 10/17/2023- 2/26 LN metastatic papillary  - pathology revealing mixed type with medullary component- sporadic   - on levothyroxine 125mcg which he should continue   - monitoring TSH, Free T4, calcitonin and CEA with [facility] Dr. Penny Dailey which he can follow up after discharge    3. Hydronephrosis   4. Acute Kidney Injury   - prior RIGHT Hydro with conversion from NUT to PCN and internalization with Urologist Dr Nicholas Hernandez   - s/p lithotripsy to right renal stone (nonobstructing) and s/p stent removal   - Now presenting with worsening creatinine and LEFT hydronephrosis on imaging with large heterogenous mass replacing the left kidney and hydroureteronephrosis to the level of a mass in the urinary bladder with additional disease deep in pelvis and heterogenous solid mass in the right kidney   - IR consulted for PCN tube placement however given the mass has replaced the left kidney unlikely to be contributing to MANISHA  - appreciate nephrology recs- MANISHA likely pre-renal hypovolemia related- has had diarrhea for 5 days  - send stool studies, cdiff    - continue with IVF    5. Anemia   - multifactorial in the setting of malignancy, blood loss   - sending iron studies, b12/folate   - transfuse to maintain >7       Tomy Jordan MD   Hematology/Oncology   [facility]  68M hx urothelial cancer (on Enfortumab Vedotin and Pembrolizumab last treatment 12/13, s/p L ureteral stent s/p exchange, mets to lung), R kidney stones (s/p R ureteroscopy w/ stone removal 8/2023), BPH, parkinson disease (s/p DBS), hypothyroidism (thyroid cancer s/p partial thyroidectomy), presented to [facility] ED after MANISHA on [place] labs [place] [facility] [place]       1. Metastatic Urothelial Carcinoma   - s/p gem/cis x4 cycles with interval increase in LNs, started q3wk pembro on 4/12/23  - s/p left renal mass biopsy showing SCC/urothelial carcinoma- IMPACT testing with HRAS G12V and TERT mutation    - was on q6wk pembro and transitioned to EV + Pembro- EV given on 12/6/23 and 12/13/23 with plan to resume combination treatment on 12/27/23   - most recent PET imaging prior to EV showing worsening b/l pulmonary mets with renal mass and LAD.   - CT A/P showing interval POD to the chest- recently had EV added to regimen for known worsening pulmonary mets   - no plans for inpatient treatment   - follow up at [facility] [place] after [place] Dr Regis Lazcano     2. Papillary Thyroid Carcinoma   - level 3 LN biopsy confirmed s/p partial thyroidectomy and left modified neck dissection with Dr. Della Colby on 10/17/2023- 2/26 LN metastatic papillary  - pathology revealing mixed type with medullary component- sporadic   - on levothyroxine 125mcg which he should continue   - monitoring TSH, Free T4, calcitonin and CEA with [facility] [place] Dr. Penny Dailey which he can follow up after discharge    3. Hydronephrosis   4. Acute Kidney Injury   - prior RIGHT Hydro with conversion from NUT to PCN and internalization with Urologist Dr Nicholas Hernandez   - s/p lithotripsy to right renal stone (nonobstructing) and s/p stent removal   - Now presenting with worsening creatinine and LEFT hydronephrosis on imaging with large heterogenous mass replacing the left kidney and hydroureteronephrosis to the level of a mass in the urinary bladder with additional disease deep in pelvis and heterogenous solid mass in the right kidney   - IR consulted for PCN tube placement however given the mass has replaced the left kidney unlikely to be contributing to MANISHA  - appreciate nephrology recs- MANISHA likely pre-renal hypovolemia related- has had diarrhea for 5 days which has now resolved. Improved creatinine with IV hydration   - continue with IVF    5. Anemia   - multifactorial in the setting of malignancy, blood loss   - sending iron studies, b12/folate   - He has had a few transfusions within the last month- given this history likely to drop further. Please optimize and transfuse 1 unit PRBC       If creatinine improving, 1 unit PRBC given no objection from heme/onc to be discharged     Tomy Jordan MD   Hematology/Oncology   [facility]  68M hx urothelial cancer (on Enfortumab Vedotin and Pembrolizumab last treatment 12/13, s/p L ureteral stent s/p exchange, mets to lung), R kidney stones (s/p R ureteroscopy w/ stone removal 8/2023), BPH, parkinson disease (s/p DBS), hypothyroidism (thyroid cancer s/p partial thyroidectomy), presented to [facility] ED after MANISHA on outpatient labs at [facility] [place].       1. Metastatic Urothelial Carcinoma   - s/p gem/cis x4 cycles with interval increase in LNs, started q3wk pembro on 4/12/23  - s/p left renal mass biopsy showing SCC/urothelial carcinoma- IMPACT testing with HRAS G12V and TERT mutation    - was on q6wk pembro and transitioned to EV + Pembro- EV given on 12/6/23 and 12/13/23 with plan to resume combination treatment on 12/27/23   - most recent PET imaging prior to EV showing worsening b/l pulmonary mets with renal mass and LAD.   - CT A/P showing interval POD to the chest- recently had EV added to regimen for known worsening pulmonary mets   - no plans for inpatient treatment   - follow up at [facility] [place] Regis Lazcano     2. Papillary Thyroid Carcinoma   - level 3 LN biopsy confirmed s/p partial thyroidectomy and left modified neck dissection with Dr. Della Colby on 10/17/2023- 2/26 LN metastatic papillary  - pathology revealing mixed type with medullary component- sporadic   - on levothyroxine 125mcg which he should continue   - monitoring TSH, Free T4, calcitonin and CEA with [facility] [place] Dr. Penny Dailey which he can follow up after discharge    3. Hydronephrosis   4. Acute Kidney Injury   - prior RIGHT Hydro with conversion from NUT to PCN and internalization with Urologist Dr Nicholas Hernandez   - s/p lithotripsy to right renal stone (nonobstructing) and s/p stent removal   - Now presenting with worsening creatinine and LEFT hydronephrosis on imaging with large heterogenous mass replacing the left kidney and hydroureteronephrosis to the level of a mass in the urinary bladder with additional disease deep in pelvis and heterogenous solid mass in the right kidney   - IR consulted for PCN tube placement however given the mass has replaced the left kidney unlikely to be contributing to MANISHA  - appreciate nephrology recs- MANISHA likely pre-renal hypovolemia related- has had diarrhea for 5 days which has now resolved. Improved creatinine with IV hydration   - continue with IVF    5. Anemia   - multifactorial in the setting of malignancy, blood loss   - sending iron studies, b12/folate   - He has had a few transfusions within the last month- given this history likely to drop further. Please optimize and transfuse 1 unit PRBC       If creatinine improving, 1 unit PRBC given no objection from heme/onc to be discharged     Tomy Jordan MD   Hematology/Oncology   [facility]

## 2023-12-23 NOTE — H&P ADULT - NSHPLABSRESULTS_GEN_ALL_CORE
LABS:                        7.7    12.51 )-----------( 326      ( 22 Dec 2023 14:00 )             24.2     12    130<L>  |  96<L>  |  27<H>  ----------------------------<  121<H>  3.1<L>   |  19<L>  |  3.93<H>    Ca    7.2<L>      22 Dec 2023 14:00  Phos  3.1       Mg     1.60         TPro  6.4  /  Alb  2.7<L>  /  TBili  0.8  /  DBili  x   /  AST  26  /  ALT  <5  /  AlkPhos  79            Urinalysis Basic - ( 22 Dec 2023 16:30 )    Color: Yellow / Appearance: Cloudy / S.016 / pH: x  Gluc: x / Ketone: Trace mg/dL  / Bili: Negative / Urobili: 0.2 mg/dL   Blood: x / Protein: 100 mg/dL / Nitrite: Negative   Leuk Esterase: Small / RBC: 11 /HPF / WBC 13 /HPF   Sq Epi: x / Non Sq Epi: 8 /HPF / Bacteria: Negative /HPF          IMAGING & OTHER TESTS:    < from: CT Abdomen and Pelvis No Cont (23 @ 16:48) >    IMPRESSION:  Interval progression of metastatic disease to the chest since 2023.  Large heterogeneous mass replacing the left kidney, consistent with known   neoplasm. Left hydroureteronephrosis to the level of a mass in the   urinary bladder. Likely additional disease within the deep pelvis. A   heterogeneous solid mass in the rightkidney.    < end of copied text >

## 2023-12-24 ENCOUNTER — TRANSCRIPTION ENCOUNTER (OUTPATIENT)
Age: 68
End: 2023-12-24

## 2023-12-24 ENCOUNTER — INPATIENT (INPATIENT)
Facility: HOSPITAL | Age: 68
LOS: 3 days | Discharge: NURSING, REHAB OR LONG-TERM CARE | DRG: 687 | End: 2023-12-28
Attending: STUDENT IN AN ORGANIZED HEALTH CARE EDUCATION/TRAINING PROGRAM | Admitting: INTERNAL MEDICINE
Payer: MEDICARE

## 2023-12-24 VITALS
HEIGHT: 67 IN | OXYGEN SATURATION: 96 % | DIASTOLIC BLOOD PRESSURE: 52 MMHG | RESPIRATION RATE: 16 BRPM | HEART RATE: 96 BPM | SYSTOLIC BLOOD PRESSURE: 91 MMHG | WEIGHT: 250 LBS | TEMPERATURE: 99 F

## 2023-12-24 VITALS
DIASTOLIC BLOOD PRESSURE: 58 MMHG | TEMPERATURE: 98 F | RESPIRATION RATE: 18 BRPM | HEART RATE: 81 BPM | OXYGEN SATURATION: 95 % | SYSTOLIC BLOOD PRESSURE: 102 MMHG

## 2023-12-24 DIAGNOSIS — Z98.890 OTHER SPECIFIED POSTPROCEDURAL STATES: Chronic | ICD-10-CM

## 2023-12-24 DIAGNOSIS — Z96.89 PRESENCE OF OTHER SPECIFIED FUNCTIONAL IMPLANTS: Chronic | ICD-10-CM

## 2023-12-24 DIAGNOSIS — Z90.49 ACQUIRED ABSENCE OF OTHER SPECIFIED PARTS OF DIGESTIVE TRACT: Chronic | ICD-10-CM

## 2023-12-24 DIAGNOSIS — Z96.649 PRESENCE OF UNSPECIFIED ARTIFICIAL HIP JOINT: Chronic | ICD-10-CM

## 2023-12-24 LAB
ALBUMIN SERPL ELPH-MCNC: 2.2 G/DL — LOW (ref 3.3–5)
ALBUMIN SERPL ELPH-MCNC: 2.2 G/DL — LOW (ref 3.3–5)
ALBUMIN SERPL ELPH-MCNC: 2.6 G/DL — LOW (ref 3.3–5)
ALBUMIN SERPL ELPH-MCNC: 2.6 G/DL — LOW (ref 3.3–5)
ALP SERPL-CCNC: 105 U/L — SIGNIFICANT CHANGE UP (ref 40–120)
ALP SERPL-CCNC: 105 U/L — SIGNIFICANT CHANGE UP (ref 40–120)
ALP SERPL-CCNC: 109 U/L — SIGNIFICANT CHANGE UP (ref 40–120)
ALP SERPL-CCNC: 109 U/L — SIGNIFICANT CHANGE UP (ref 40–120)
ALT FLD-CCNC: 21 U/L — SIGNIFICANT CHANGE UP (ref 12–78)
ALT FLD-CCNC: 21 U/L — SIGNIFICANT CHANGE UP (ref 12–78)
ALT FLD-CCNC: 9 U/L — SIGNIFICANT CHANGE UP (ref 4–41)
ALT FLD-CCNC: 9 U/L — SIGNIFICANT CHANGE UP (ref 4–41)
ANION GAP SERPL CALC-SCNC: 11 MMOL/L — SIGNIFICANT CHANGE UP (ref 5–17)
ANION GAP SERPL CALC-SCNC: 11 MMOL/L — SIGNIFICANT CHANGE UP (ref 5–17)
ANION GAP SERPL CALC-SCNC: 15 MMOL/L — HIGH (ref 7–14)
ANION GAP SERPL CALC-SCNC: 15 MMOL/L — HIGH (ref 7–14)
APTT BLD: 24.4 SEC — LOW (ref 24.5–35.6)
APTT BLD: 24.4 SEC — LOW (ref 24.5–35.6)
AST SERPL-CCNC: 30 U/L — SIGNIFICANT CHANGE UP (ref 4–40)
AST SERPL-CCNC: 30 U/L — SIGNIFICANT CHANGE UP (ref 4–40)
AST SERPL-CCNC: 51 U/L — HIGH (ref 15–37)
AST SERPL-CCNC: 51 U/L — HIGH (ref 15–37)
BASOPHILS # BLD AUTO: 0.07 K/UL — SIGNIFICANT CHANGE UP (ref 0–0.2)
BASOPHILS # BLD AUTO: 0.07 K/UL — SIGNIFICANT CHANGE UP (ref 0–0.2)
BASOPHILS NFR BLD AUTO: 0.9 % — SIGNIFICANT CHANGE UP (ref 0–2)
BASOPHILS NFR BLD AUTO: 0.9 % — SIGNIFICANT CHANGE UP (ref 0–2)
BILIRUB SERPL-MCNC: 1 MG/DL — SIGNIFICANT CHANGE UP (ref 0.2–1.2)
BILIRUB SERPL-MCNC: 1 MG/DL — SIGNIFICANT CHANGE UP (ref 0.2–1.2)
BILIRUB SERPL-MCNC: 1.1 MG/DL — SIGNIFICANT CHANGE UP (ref 0.2–1.2)
BILIRUB SERPL-MCNC: 1.1 MG/DL — SIGNIFICANT CHANGE UP (ref 0.2–1.2)
BUN SERPL-MCNC: 22 MG/DL — SIGNIFICANT CHANGE UP (ref 7–23)
BUN SERPL-MCNC: 22 MG/DL — SIGNIFICANT CHANGE UP (ref 7–23)
BUN SERPL-MCNC: 23 MG/DL — SIGNIFICANT CHANGE UP (ref 7–23)
BUN SERPL-MCNC: 23 MG/DL — SIGNIFICANT CHANGE UP (ref 7–23)
CALCIUM SERPL-MCNC: 7.7 MG/DL — LOW (ref 8.4–10.5)
CALCIUM SERPL-MCNC: 7.7 MG/DL — LOW (ref 8.4–10.5)
CALCIUM SERPL-MCNC: 8.1 MG/DL — LOW (ref 8.5–10.1)
CALCIUM SERPL-MCNC: 8.1 MG/DL — LOW (ref 8.5–10.1)
CHLORIDE SERPL-SCNC: 103 MMOL/L — SIGNIFICANT CHANGE UP (ref 98–107)
CHLORIDE SERPL-SCNC: 103 MMOL/L — SIGNIFICANT CHANGE UP (ref 98–107)
CHLORIDE SERPL-SCNC: 106 MMOL/L — SIGNIFICANT CHANGE UP (ref 96–108)
CHLORIDE SERPL-SCNC: 106 MMOL/L — SIGNIFICANT CHANGE UP (ref 96–108)
CO2 SERPL-SCNC: 18 MMOL/L — LOW (ref 22–31)
CO2 SERPL-SCNC: 18 MMOL/L — LOW (ref 22–31)
CO2 SERPL-SCNC: 20 MMOL/L — LOW (ref 22–31)
CO2 SERPL-SCNC: 20 MMOL/L — LOW (ref 22–31)
CREAT SERPL-MCNC: 1.6 MG/DL — HIGH (ref 0.5–1.3)
CREAT SERPL-MCNC: 1.6 MG/DL — HIGH (ref 0.5–1.3)
CREAT SERPL-MCNC: 1.65 MG/DL — HIGH (ref 0.5–1.3)
CREAT SERPL-MCNC: 1.65 MG/DL — HIGH (ref 0.5–1.3)
CULTURE RESULTS: SIGNIFICANT CHANGE UP
CULTURE RESULTS: SIGNIFICANT CHANGE UP
EGFR: 45 ML/MIN/1.73M2 — LOW
EGFR: 45 ML/MIN/1.73M2 — LOW
EGFR: 47 ML/MIN/1.73M2 — LOW
EGFR: 47 ML/MIN/1.73M2 — LOW
EOSINOPHIL # BLD AUTO: 0.03 K/UL — SIGNIFICANT CHANGE UP (ref 0–0.5)
EOSINOPHIL # BLD AUTO: 0.03 K/UL — SIGNIFICANT CHANGE UP (ref 0–0.5)
EOSINOPHIL NFR BLD AUTO: 0.4 % — SIGNIFICANT CHANGE UP (ref 0–6)
EOSINOPHIL NFR BLD AUTO: 0.4 % — SIGNIFICANT CHANGE UP (ref 0–6)
GLUCOSE SERPL-MCNC: 130 MG/DL — HIGH (ref 70–99)
GLUCOSE SERPL-MCNC: 130 MG/DL — HIGH (ref 70–99)
GLUCOSE SERPL-MCNC: 88 MG/DL — SIGNIFICANT CHANGE UP (ref 70–99)
GLUCOSE SERPL-MCNC: 88 MG/DL — SIGNIFICANT CHANGE UP (ref 70–99)
HCT VFR BLD CALC: 26.2 % — LOW (ref 39–50)
HCT VFR BLD CALC: 26.2 % — LOW (ref 39–50)
HCT VFR BLD CALC: 26.4 % — LOW (ref 39–50)
HCT VFR BLD CALC: 26.4 % — LOW (ref 39–50)
HGB BLD-MCNC: 8.3 G/DL — LOW (ref 13–17)
HGB BLD-MCNC: 8.3 G/DL — LOW (ref 13–17)
HGB BLD-MCNC: 8.4 G/DL — LOW (ref 13–17)
HGB BLD-MCNC: 8.4 G/DL — LOW (ref 13–17)
IANC: 5.83 K/UL — SIGNIFICANT CHANGE UP (ref 1.8–7.4)
IANC: 5.83 K/UL — SIGNIFICANT CHANGE UP (ref 1.8–7.4)
IMM GRANULOCYTES NFR BLD AUTO: 1.7 % — HIGH (ref 0–0.9)
IMM GRANULOCYTES NFR BLD AUTO: 1.7 % — HIGH (ref 0–0.9)
INR BLD: 1.27 RATIO — HIGH (ref 0.85–1.18)
INR BLD: 1.27 RATIO — HIGH (ref 0.85–1.18)
LYMPHOCYTES # BLD AUTO: 0.78 K/UL — LOW (ref 1–3.3)
LYMPHOCYTES # BLD AUTO: 0.78 K/UL — LOW (ref 1–3.3)
LYMPHOCYTES # BLD AUTO: 10.4 % — LOW (ref 13–44)
LYMPHOCYTES # BLD AUTO: 10.4 % — LOW (ref 13–44)
MAGNESIUM SERPL-MCNC: 1.8 MG/DL — SIGNIFICANT CHANGE UP (ref 1.6–2.6)
MAGNESIUM SERPL-MCNC: 1.8 MG/DL — SIGNIFICANT CHANGE UP (ref 1.6–2.6)
MAGNESIUM SERPL-MCNC: 2 MG/DL — SIGNIFICANT CHANGE UP (ref 1.6–2.6)
MAGNESIUM SERPL-MCNC: 2 MG/DL — SIGNIFICANT CHANGE UP (ref 1.6–2.6)
MCHC RBC-ENTMCNC: 23.7 PG — LOW (ref 27–34)
MCHC RBC-ENTMCNC: 23.7 PG — LOW (ref 27–34)
MCHC RBC-ENTMCNC: 24.1 PG — LOW (ref 27–34)
MCHC RBC-ENTMCNC: 24.1 PG — LOW (ref 27–34)
MCHC RBC-ENTMCNC: 31.7 GM/DL — LOW (ref 32–36)
MCHC RBC-ENTMCNC: 31.7 GM/DL — LOW (ref 32–36)
MCHC RBC-ENTMCNC: 31.8 GM/DL — LOW (ref 32–36)
MCHC RBC-ENTMCNC: 31.8 GM/DL — LOW (ref 32–36)
MCV RBC AUTO: 74.9 FL — LOW (ref 80–100)
MCV RBC AUTO: 74.9 FL — LOW (ref 80–100)
MCV RBC AUTO: 75.6 FL — LOW (ref 80–100)
MCV RBC AUTO: 75.6 FL — LOW (ref 80–100)
MONOCYTES # BLD AUTO: 0.67 K/UL — SIGNIFICANT CHANGE UP (ref 0–0.9)
MONOCYTES # BLD AUTO: 0.67 K/UL — SIGNIFICANT CHANGE UP (ref 0–0.9)
MONOCYTES NFR BLD AUTO: 8.9 % — SIGNIFICANT CHANGE UP (ref 2–14)
MONOCYTES NFR BLD AUTO: 8.9 % — SIGNIFICANT CHANGE UP (ref 2–14)
NEUTROPHILS # BLD AUTO: 5.83 K/UL — SIGNIFICANT CHANGE UP (ref 1.8–7.4)
NEUTROPHILS # BLD AUTO: 5.83 K/UL — SIGNIFICANT CHANGE UP (ref 1.8–7.4)
NEUTROPHILS NFR BLD AUTO: 77.7 % — HIGH (ref 43–77)
NEUTROPHILS NFR BLD AUTO: 77.7 % — HIGH (ref 43–77)
NRBC # BLD: 0 /100 WBCS — SIGNIFICANT CHANGE UP (ref 0–0)
NRBC # FLD: 0 K/UL — SIGNIFICANT CHANGE UP (ref 0–0)
NRBC # FLD: 0 K/UL — SIGNIFICANT CHANGE UP (ref 0–0)
PHOSPHATE SERPL-MCNC: 3 MG/DL — SIGNIFICANT CHANGE UP (ref 2.5–4.5)
PHOSPHATE SERPL-MCNC: 3 MG/DL — SIGNIFICANT CHANGE UP (ref 2.5–4.5)
PLATELET # BLD AUTO: 320 K/UL — SIGNIFICANT CHANGE UP (ref 150–400)
PLATELET # BLD AUTO: 320 K/UL — SIGNIFICANT CHANGE UP (ref 150–400)
PLATELET # BLD AUTO: 333 K/UL — SIGNIFICANT CHANGE UP (ref 150–400)
PLATELET # BLD AUTO: 333 K/UL — SIGNIFICANT CHANGE UP (ref 150–400)
POTASSIUM SERPL-MCNC: 2.9 MMOL/L — CRITICAL LOW (ref 3.5–5.3)
POTASSIUM SERPL-MCNC: 2.9 MMOL/L — CRITICAL LOW (ref 3.5–5.3)
POTASSIUM SERPL-MCNC: 3.5 MMOL/L — SIGNIFICANT CHANGE UP (ref 3.5–5.3)
POTASSIUM SERPL-MCNC: 3.5 MMOL/L — SIGNIFICANT CHANGE UP (ref 3.5–5.3)
POTASSIUM SERPL-SCNC: 2.9 MMOL/L — CRITICAL LOW (ref 3.5–5.3)
POTASSIUM SERPL-SCNC: 2.9 MMOL/L — CRITICAL LOW (ref 3.5–5.3)
POTASSIUM SERPL-SCNC: 3.5 MMOL/L — SIGNIFICANT CHANGE UP (ref 3.5–5.3)
POTASSIUM SERPL-SCNC: 3.5 MMOL/L — SIGNIFICANT CHANGE UP (ref 3.5–5.3)
PROT SERPL-MCNC: 6.1 G/DL — SIGNIFICANT CHANGE UP (ref 6–8.3)
PROT SERPL-MCNC: 6.1 G/DL — SIGNIFICANT CHANGE UP (ref 6–8.3)
PROT SERPL-MCNC: 6.2 G/DL — SIGNIFICANT CHANGE UP (ref 6–8.3)
PROT SERPL-MCNC: 6.2 G/DL — SIGNIFICANT CHANGE UP (ref 6–8.3)
PROTHROM AB SERPL-ACNC: 14.7 SEC — HIGH (ref 9.5–13)
PROTHROM AB SERPL-ACNC: 14.7 SEC — HIGH (ref 9.5–13)
RBC # BLD: 3.49 M/UL — LOW (ref 4.2–5.8)
RBC # BLD: 3.49 M/UL — LOW (ref 4.2–5.8)
RBC # BLD: 3.5 M/UL — LOW (ref 4.2–5.8)
RBC # BLD: 3.5 M/UL — LOW (ref 4.2–5.8)
RBC # FLD: 20.8 % — HIGH (ref 10.3–14.5)
SODIUM SERPL-SCNC: 136 MMOL/L — SIGNIFICANT CHANGE UP (ref 135–145)
SODIUM SERPL-SCNC: 136 MMOL/L — SIGNIFICANT CHANGE UP (ref 135–145)
SODIUM SERPL-SCNC: 137 MMOL/L — SIGNIFICANT CHANGE UP (ref 135–145)
SODIUM SERPL-SCNC: 137 MMOL/L — SIGNIFICANT CHANGE UP (ref 135–145)
SPECIMEN SOURCE: SIGNIFICANT CHANGE UP
SPECIMEN SOURCE: SIGNIFICANT CHANGE UP
WBC # BLD: 7.51 K/UL — SIGNIFICANT CHANGE UP (ref 3.8–10.5)
WBC # BLD: 7.51 K/UL — SIGNIFICANT CHANGE UP (ref 3.8–10.5)
WBC # BLD: 7.82 K/UL — SIGNIFICANT CHANGE UP (ref 3.8–10.5)
WBC # BLD: 7.82 K/UL — SIGNIFICANT CHANGE UP (ref 3.8–10.5)
WBC # FLD AUTO: 7.51 K/UL — SIGNIFICANT CHANGE UP (ref 3.8–10.5)
WBC # FLD AUTO: 7.51 K/UL — SIGNIFICANT CHANGE UP (ref 3.8–10.5)
WBC # FLD AUTO: 7.82 K/UL — SIGNIFICANT CHANGE UP (ref 3.8–10.5)
WBC # FLD AUTO: 7.82 K/UL — SIGNIFICANT CHANGE UP (ref 3.8–10.5)

## 2023-12-24 PROCEDURE — 99285 EMERGENCY DEPT VISIT HI MDM: CPT

## 2023-12-24 PROCEDURE — 93010 ELECTROCARDIOGRAM REPORT: CPT

## 2023-12-24 PROCEDURE — 99232 SBSQ HOSP IP/OBS MODERATE 35: CPT | Mod: GC

## 2023-12-24 RX ORDER — ACETAMINOPHEN 500 MG
2 TABLET ORAL
Qty: 0 | Refills: 0 | DISCHARGE
Start: 2023-12-24

## 2023-12-24 RX ORDER — LANOLIN ALCOHOL/MO/W.PET/CERES
1 CREAM (GRAM) TOPICAL
Qty: 0 | Refills: 0 | DISCHARGE
Start: 2023-12-24

## 2023-12-24 RX ORDER — ACETAMINOPHEN 500 MG
1000 TABLET ORAL ONCE
Refills: 0 | Status: COMPLETED | OUTPATIENT
Start: 2023-12-24 | End: 2023-12-24

## 2023-12-24 RX ORDER — MUPIROCIN 20 MG/G
1 OINTMENT TOPICAL
Qty: 0 | Refills: 0 | DISCHARGE
Start: 2023-12-24

## 2023-12-24 RX ORDER — LACTOBACILLUS ACIDOPHILUS 100MM CELL
2 CAPSULE ORAL
Refills: 0 | DISCHARGE

## 2023-12-24 RX ORDER — MAGNESIUM SULFATE 500 MG/ML
2 VIAL (ML) INJECTION
Refills: 0 | Status: COMPLETED | OUTPATIENT
Start: 2023-12-24 | End: 2023-12-24

## 2023-12-24 RX ORDER — POTASSIUM CHLORIDE 20 MEQ
40 PACKET (EA) ORAL EVERY 4 HOURS
Refills: 0 | Status: COMPLETED | OUTPATIENT
Start: 2023-12-24 | End: 2023-12-24

## 2023-12-24 RX ORDER — ONDANSETRON 8 MG/1
4 TABLET, FILM COATED ORAL ONCE
Refills: 0 | Status: COMPLETED | OUTPATIENT
Start: 2023-12-24 | End: 2023-12-24

## 2023-12-24 RX ORDER — HYDROMORPHONE HYDROCHLORIDE 2 MG/ML
0.5 INJECTION INTRAMUSCULAR; INTRAVENOUS; SUBCUTANEOUS ONCE
Refills: 0 | Status: DISCONTINUED | OUTPATIENT
Start: 2023-12-24 | End: 2023-12-24

## 2023-12-24 RX ORDER — FAMOTIDINE 10 MG/ML
20 INJECTION INTRAVENOUS ONCE
Refills: 0 | Status: COMPLETED | OUTPATIENT
Start: 2023-12-24 | End: 2023-12-24

## 2023-12-24 RX ORDER — TAMSULOSIN HYDROCHLORIDE 0.4 MG/1
2 CAPSULE ORAL
Qty: 0 | Refills: 0 | DISCHARGE
Start: 2023-12-24

## 2023-12-24 RX ORDER — AMLODIPINE BESYLATE 2.5 MG/1
1 TABLET ORAL
Refills: 0 | DISCHARGE

## 2023-12-24 RX ORDER — METOPROLOL TARTRATE 50 MG
1 TABLET ORAL
Refills: 0 | DISCHARGE

## 2023-12-24 RX ORDER — LOSARTAN POTASSIUM 100 MG/1
1 TABLET, FILM COATED ORAL
Refills: 0 | DISCHARGE

## 2023-12-24 RX ORDER — CARBIDOPA AND LEVODOPA 25; 100 MG/1; MG/1
0.5 TABLET ORAL
Qty: 0 | Refills: 0 | DISCHARGE
Start: 2023-12-24

## 2023-12-24 RX ORDER — CARBIDOPA AND LEVODOPA 25; 100 MG/1; MG/1
0.5 TABLET ORAL
Refills: 0 | DISCHARGE

## 2023-12-24 RX ORDER — CARBIDOPA AND LEVODOPA 25; 100 MG/1; MG/1
0.5 TABLET ORAL ONCE
Refills: 0 | Status: COMPLETED | OUTPATIENT
Start: 2023-12-24 | End: 2023-12-24

## 2023-12-24 RX ORDER — SODIUM CHLORIDE 9 MG/ML
1000 INJECTION, SOLUTION INTRAVENOUS ONCE
Refills: 0 | Status: COMPLETED | OUTPATIENT
Start: 2023-12-24 | End: 2023-12-24

## 2023-12-24 RX ORDER — TAMSULOSIN HYDROCHLORIDE 0.4 MG/1
2 CAPSULE ORAL
Refills: 0 | DISCHARGE

## 2023-12-24 RX ADMIN — Medication 400 MILLIGRAM(S): at 21:07

## 2023-12-24 RX ADMIN — Medication 40 MILLIEQUIVALENT(S): at 12:47

## 2023-12-24 RX ADMIN — CARBIDOPA AND LEVODOPA 0.5 TABLET(S): 25; 100 TABLET ORAL at 12:48

## 2023-12-24 RX ADMIN — MAGNESIUM OXIDE 400 MG ORAL TABLET 400 MILLIGRAM(S): 241.3 TABLET ORAL at 12:47

## 2023-12-24 RX ADMIN — Medication 1000 MILLIGRAM(S): at 21:24

## 2023-12-24 RX ADMIN — HYDROMORPHONE HYDROCHLORIDE 0.5 MILLIGRAM(S): 2 INJECTION INTRAMUSCULAR; INTRAVENOUS; SUBCUTANEOUS at 22:00

## 2023-12-24 RX ADMIN — Medication 125 MICROGRAM(S): at 06:36

## 2023-12-24 RX ADMIN — ONDANSETRON 4 MILLIGRAM(S): 8 TABLET, FILM COATED ORAL at 20:56

## 2023-12-24 RX ADMIN — MUPIROCIN 1 APPLICATION(S): 20 OINTMENT TOPICAL at 06:42

## 2023-12-24 RX ADMIN — Medication 25 GRAM(S): at 10:17

## 2023-12-24 RX ADMIN — Medication 1000 MILLIGRAM(S): at 22:00

## 2023-12-24 RX ADMIN — SODIUM CHLORIDE 100 MILLILITER(S): 9 INJECTION, SOLUTION INTRAVENOUS at 09:43

## 2023-12-24 RX ADMIN — SODIUM CHLORIDE 1000 MILLILITER(S): 9 INJECTION, SOLUTION INTRAVENOUS at 22:18

## 2023-12-24 RX ADMIN — Medication 40 MILLIEQUIVALENT(S): at 09:44

## 2023-12-24 RX ADMIN — MAGNESIUM OXIDE 400 MG ORAL TABLET 400 MILLIGRAM(S): 241.3 TABLET ORAL at 09:44

## 2023-12-24 RX ADMIN — HYDROMORPHONE HYDROCHLORIDE 0.5 MILLIGRAM(S): 2 INJECTION INTRAMUSCULAR; INTRAVENOUS; SUBCUTANEOUS at 20:57

## 2023-12-24 RX ADMIN — CARBIDOPA AND LEVODOPA 0.5 TABLET(S): 25; 100 TABLET ORAL at 06:34

## 2023-12-24 RX ADMIN — Medication 81 MILLIGRAM(S): at 12:47

## 2023-12-24 RX ADMIN — SODIUM CHLORIDE 1000 MILLILITER(S): 9 INJECTION, SOLUTION INTRAVENOUS at 20:56

## 2023-12-24 RX ADMIN — Medication 25 GRAM(S): at 11:35

## 2023-12-24 RX ADMIN — FAMOTIDINE 20 MILLIGRAM(S): 10 INJECTION INTRAVENOUS at 20:56

## 2023-12-24 RX ADMIN — CARBIDOPA AND LEVODOPA 0.5 TABLET(S): 25; 100 TABLET ORAL at 22:22

## 2023-12-24 NOTE — PROGRESS NOTE ADULT - PROBLEM SELECTOR PLAN 1
Pt. with MANISHA in the setting of hypotension, diarrhea, Losartan use, ?sepsis and decreased PO intake. On review of [place], Scr was WNL at 1.22 (eGFR: 65) on 11/22/23. Scr initially during this admission is significantly elevated at 3.93 today. UOP is not documented. UA shows proteinuria, hematuria, and evidence of infection. Upon review of kidney sonogram performed today at [facility] [place] as seen on wife's phone: No hydronephrosis but showing 21.5 cm L renal mass (increased from 10x8 cm on CT abdomen performed on 9/6/23, as seen on [facility]). CT abd/pel this admission demonstrating "Extensive soft tissue mass replacing the left kidney, measuring 17 x 19 cm. Left hydroureteronephrosis to the level of the mass in the urinary bladder. Heterogeneous mass in the right kidney not well visualized in the absence of IV contrast but measures 5 x 4.3 cm. No hydronephrosis on the right. Punctate nonobstructing stones on the right." Labs reviewed. SCr improving with IVFs. Continue supportive care. If SCr worsens, may need to consider further evaluation of L sided hydronephrosis. Avoid nephrotoxins. Dose medications as per eGFR. Pt. with MANISHA in the setting of hypotension, diarrhea, Losartan use, ?sepsis and decreased PO intake. On review of [place], Scr was WNL at 1.22 (eGFR: 65) on 11/22/23. Scr initially during this admission is significantly elevated at 3.93 today. UOP is not documented. UA shows proteinuria, hematuria, and evidence of infection. Upon review of kidney sonogram performed today at [facility] [place] as seen on wife's phone: No hydronephrosis but showing 21.5 cm L renal mass (increased from 10x8 cm on CT abdomen performed on 9/6/23, as seen on [facility]). CT abd/pel this admission demonstrating "Extensive soft tissue mass replacing the left kidney, measuring 17 x 19 cm. Left hydroureteronephrosis to the level of the mass in the urinary bladder. Heterogeneous mass in the right kidney not well visualized in the absence of IV contrast but measures 5 x 4.3 cm. No hydronephrosis on the right. Punctate nonobstructing stones on the right." Labs reviewed. SCr improving with IVFs. Continue supportive care. If SCr worsens, may need to consider further evaluation of L sided hydronephrosis. Avoid nephrotoxins. Dose medications as per eGFR.

## 2023-12-24 NOTE — ED ADULT NURSE NOTE - NSFALLHARMRISKINTERV_ED_ALL_ED
Assistance OOB with selected safe patient handling equipment if applicable/Assistance with ambulation/Communicate risk of Fall with Harm to all staff, patient, and family/Monitor gait and stability/Provide patient with walking aids/Provide visual cue: red socks, yellow wristband, yellow gown, etc/Reinforce activity limits and safety measures with patient and family/Bed in lowest position, wheels locked, appropriate side rails in place/Call bell, personal items and telephone in reach/Instruct patient to call for assistance before getting out of bed/chair/stretcher/Non-slip footwear applied when patient is off stretcher/[place] to call system/Physically safe environment - no spills, clutter or unnecessary equipment/Purposeful Proactive Rounding/Room/bathroom lighting operational, light cord in reach Assistance OOB with selected safe patient handling equipment if applicable/Assistance with ambulation/Communicate risk of Fall with Harm to all staff, patient, and family/Monitor gait and stability/Provide patient with walking aids/Provide visual cue: red socks, yellow wristband, yellow gown, etc/Reinforce activity limits and safety measures with patient and family/Bed in lowest position, wheels locked, appropriate side rails in place/Call bell, personal items and telephone in reach/Instruct patient to call for assistance before getting out of bed/chair/stretcher/Non-slip footwear applied when patient is off stretcher/[place] to call system/Physically safe environment - no spills, clutter or unnecessary equipment/Purposeful Proactive Rounding/Room/bathroom lighting operational, light cord in reach

## 2023-12-24 NOTE — PROGRESS NOTE ADULT - PROBLEM SELECTOR PLAN 4
# Hyponatremia  # Hypokalemia  Both i/s/o diarrhea, poor PO intake.  > CTM on BMP, avoid overcorrection with fluids  > replete lytes PRN
# Hyponatremia  # Hypokalemia  Both i/s/o diarrhea, poor PO intake.  > CTM on BMP, avoid overcorrection with fluids  > replete lytes PRN

## 2023-12-24 NOTE — ED ADULT NURSE NOTE - OBJECTIVE STATEMENT
patient to ED from home after multiple falls today since being discharged from [facility] earlier today. no new pain since falls. as per family, he became anxious, his knees buckled, and he lowered himself to the floor and didn't hit head. hx urethral ca with mets to lungs. has been having diarrhea since saturday. abdomen soft/nontender. weak with mumbled speech patient to ED from home after multiple falls today since being discharged from [facility] earlier today. no new pain since falls. as per family, he became anxious, his knees buckled, and he lowered himself to the floor and didn't hit head. hx urethral ca with mets to lungs. has been having diarrhea since saturday. abdomen soft/nontender. weak with mumbled speech

## 2023-12-24 NOTE — ED PROVIDER NOTE - OBJECTIVE STATEMENT
Patient is a 68-year-old white male with history of renal cell carcinoma as well as thyroid carcinoma most recently treated at [facility] and [facility] discharged from [facility] earlier today back here for worsening weakness fatigue inability to walk without significant assistance.  No vomiting no diarrhea no cough no shortness of breath no fever no chills. Wife and son were able to prevent patient from falling. Patient is a 68-year-old white male with history of renal cell carcinoma as well as thyroid carcinoma most recently treated at [facility] and [facility] discharged from [facility] earlier today back here for worsening weakness fatigue inability to walk without significant assistance.  No vomiting no diarrhea no cough no shortness of breath no fever no chills. Wife and son were able to prevent patient from falling.

## 2023-12-24 NOTE — DISCHARGE NOTE PROVIDER - NSDCMRMEDTOKEN_GEN_ALL_CORE_FT
Acidophilus oral tablet: 2 tab(s) orally 2 times a day  amLODIPine 10 mg oral tablet: 1 tab(s) orally once a day  aspirin 81 mg oral tablet: 1 tab(s) orally once a day  atorvastatin 20 mg oral tablet: 1 tab(s) orally once a day  carbidopa-levodopa 25 mg-250 mg oral tablet: 0.5 tab(s) orally 4 times a day  diphenoxylate-atropine 2.5 mg-0.025 mg oral tablet: 1 tab(s) orally 3 times a day as needed for  diarrhea  Flomax 0.4 mg oral capsule: 2 cap(s) orally once a day (at bedtime)  levothyroxine 125 mcg (0.125 mg) oral tablet: 1 tab(s) orally once a day  losartan 100 mg oral tablet: 1 tab(s) orally once a day  magnesium oxide 400 mg oral tablet: 1 tab(s) orally 3 times a day  metoprolol succinate 50 mg oral tablet, extended release: 1 tab(s) orally 2 times a day Confirmed BID dosing with outpatient pharmacy  Potassium Chloride (Eqv-Klor-Con M20) 20 mEq oral tablet, extended release: 1 tab(s) orally once a day  senna leaf extract oral tablet: 2 tab(s) orally once a day (at bedtime) As needed Constipation   acetaminophen 325 mg oral tablet: 2 tab(s) orally every 6 hours As needed Temp greater or equal to 38C (100.4F), Mild Pain (1 - 3)  aspirin 81 mg oral tablet: 1 tab(s) orally once a day  atorvastatin 20 mg oral tablet: 1 tab(s) orally once a day  carbidopa-levodopa 25 mg-250 mg oral tablet: 0.5 tab(s) orally 4 times a day  diphenoxylate-atropine 2.5 mg-0.025 mg oral tablet: 1 tab(s) orally 3 times a day as needed for  diarrhea  levothyroxine 125 mcg (0.125 mg) oral tablet: 1 tab(s) orally once a day  magnesium oxide 400 mg oral tablet: 1 tab(s) orally 3 times a day  melatonin 3 mg oral tablet: 1 tab(s) orally once a day (at bedtime) As needed Insomnia  mupirocin 2% topical ointment: 1 Apply topically to affected area 2 times a day  Potassium Chloride (Eqv-Klor-Con M20) 20 mEq oral tablet, extended release: 1 tab(s) orally once a day  senna leaf extract oral tablet: 2 tab(s) orally once a day (at bedtime) As needed Constipation  tamsulosin 0.4 mg oral capsule: 2 cap(s) orally once a day (at bedtime)

## 2023-12-24 NOTE — PHYSICAL THERAPY INITIAL EVALUATION ADULT - GAIT DISTANCE, PT EVAL
3 lateral steps; further mobility unable to performed at this time secondary to pt actively having bowel movement and bilateral LE weakness.

## 2023-12-24 NOTE — DISCHARGE NOTE PROVIDER - HOSPITAL COURSE
68M hx urothelial cancer (on Enfortumab Vedotin and Pembrolizumab last treatment 12/13, s/p L ureteral stent s/p exchange, mets to lung), R kidney stones (s/p R ureteroscopy w/ stone removal 8/2023), BPH, parkinson disease (s/p DBS), hypothyroidism (thyroid cancer s/p partial thyroidectomy), [place] to [facility] ED [place] [facility] [place]       1. Metastatic Urothelial Carcinoma   - s/p gem/cis x4 cycles with interval increase in LNs, started q3wk pembro on 4/12/23  - s/p left renal mass biopsy showing SCC/urothelial carcinoma- IMPACT testing with HRAS G12V and TERT mutation    - was on q6wk pembro and transitioned to EV + Pembro- EV given on 12/6/23 and 12/13/23 with plan to resume combination treatment on 12/27/23   - most recent PET imaging prior to EV showing worsening b/l pulmonary mets with renal mass and LAD.   - CT A/P showing interval POD to the chest- recently had EV added to regimen for known worsening pulmonary mets   - no plans for inpatient treatment   - follow up at [facility] [place] after [place] Dr Regis Lazcano     2. Papillary Thyroid Carcinoma   - level 3 LN biopsy confirmed s/p partial thyroidectomy and left modified neck dissection with Dr. Della Colby on 10/17/2023- 2/26 LN metastatic papillary  - pathology revealing mixed type with medullary component- sporadic   - on levothyroxine 125mcg which he should continue   - monitoring TSH, Free T4, calcitonin and CEA with [facility] [place] Dr. Penny Dailey which he can follow up after discharge    3. Hydronephrosis   4. Acute Kidney Injury   - prior RIGHT Hydro with conversion from NUT to PCN and internalization with Urologist Dr Nicholas Hernandez   - s/p lithotripsy to right renal stone (nonobstructing) and s/p stent removal   - Now presenting with worsening creatinine and LEFT hydronephrosis on imaging with large heterogenous mass replacing the left kidney and hydroureteronephrosis to the level of a mass in the urinary bladder with additional disease deep in pelvis and heterogenous solid mass in the right kidney   - IR consulted for PCN tube placement however given the mass has replaced the left kidney unlikely to be contributing to MANISHA  - appreciate nephrology recs- MANISHA likely pre-renal hypovolemia related- has had diarrhea for 5 days which has now resolved. Improved creatinine with IV hydration   - continue with IVF    5. Anemia   - multifactorial in the setting of malignancy, blood loss   - sending iron studies, b12/folate   - He has had a few transfusions within the last month- given this history likely to drop further. Please optimize and transfuse 1 unit PRBC    68M hx urothelial cancer (on Enfortumab Vedotin and Pembrolizumab last treatment 12/13, s/p L ureteral stent s/p exchange, mets to lung), R kidney stones (s/p R ureteroscopy w/ stone removal 8/2023), BPH, parkinson disease (s/p DBS), hypothyroidism (thyroid cancer s/p partial thyroidectomy), [place] to [facility] ED [place] [facility] [place]       1. Metastatic Urothelial Carcinoma   - s/p gem/cis x4 cycles with interval increase in LNs, started q3wk pembro on 4/12/23  - s/p left renal mass biopsy showing SCC/urothelial carcinoma- IMPACT testing with HRAS G12V and TERT mutation    - was on q6wk pembro and transitioned to EV + Pembro- EV given on 12/6/23 and 12/13/23 with plan to resume combination treatment on 12/27/23   - most recent PET imaging prior to EV showing worsening b/l pulmonary mets with renal mass and LAD.   - CT A/P showing interval POD to the chest- recently had EV added to regimen for known worsening pulmonary mets   - no plans for inpatient treatment   - follow up at [facility] [place] after discharge Dr Regis Lazcano     2. Papillary Thyroid Carcinoma   - level 3 LN biopsy confirmed s/p partial thyroidectomy and left modified neck dissection with Dr. Della Colby on 10/17/2023- 2/26 LN metastatic papillary  - pathology revealing mixed type with medullary component- sporadic   - on levothyroxine 125mcg which he should continue   - monitoring TSH, Free T4, calcitonin and CEA with [facility] Dr. Penny Dailey which he can follow up after discharge    3. Hydronephrosis   4. Acute Kidney Injury   - prior RIGHT Hydro with conversion from NUT to PCN and internalization with Urologist Dr Nicholas Hernandez   - s/p lithotripsy to right renal stone (nonobstructing) and s/p stent removal   - Now presenting with worsening creatinine and LEFT hydronephrosis on imaging with large heterogenous mass replacing the left kidney and hydroureteronephrosis to the level of a mass in the urinary bladder with additional disease deep in pelvis and heterogenous solid mass in the right kidney   - IR consulted for PCN tube placement however given the mass has replaced the left kidney unlikely to be contributing to MANISHA  - appreciate nephrology recs- MANISHA likely pre-renal hypovolemia related- has had diarrhea for 5 days which has now resolved. Improved creatinine with IV hydration   - continue with IVF    5. Anemia   - multifactorial in the setting of malignancy, blood loss   - sending iron studies, b12/folate   - He has had a few transfusions within the last month- given this history likely to drop further. Please optimize and transfuse 1 unit PRBC    68M hx urothelial cancer (on Enfortumab Vedotin and Pembrolizumab last treatment 12/13, s/p L ureteral stent s/p exchange, mets to lung), R kidney stones (s/p R ureteroscopy w/ stone removal 8/2023), BPH, parkinson disease (s/p DBS), hypothyroidism (thyroid cancer s/p partial thyroidectomy), presented to [facility] ED after [place] [facility] [place]       1. Metastatic Urothelial Carcinoma   - s/p gem/cis x4 cycles with interval increase in LNs, started q3wk pembro on 4/12/23  - s/p left renal mass biopsy showing SCC/urothelial carcinoma- IMPACT testing with HRAS G12V and TERT mutation    - was on q6wk pembro and transitioned to EV + Pembro- EV given on 12/6/23 and 12/13/23 with plan to resume combination treatment on 12/27/23   - most recent PET imaging prior to EV showing worsening b/l pulmonary mets with renal mass and LAD.   - CT A/P showing interval POD to the chest- recently had EV added to regimen for known worsening pulmonary mets   - no plans for inpatient treatment   - follow up at [facility] [place] after [place] Dr Regis Lazcano     2. Papillary Thyroid Carcinoma   - level 3 LN biopsy confirmed s/p partial thyroidectomy and left modified neck dissection with Dr. Della Colby on 10/17/2023- 2/26 LN metastatic papillary  - pathology revealing mixed type with medullary component- sporadic   - on levothyroxine 125mcg which he should continue   - monitoring TSH, Free T4, calcitonin and CEA with [facility] [place] [facility] Dr. Penny Dailey which he can follow up after discharge    3. Hydronephrosis   4. Acute Kidney Injury   - prior RIGHT Hydro with conversion from NUT to PCN and internalization with Urologist Dr Nicholas Hernandez   - s/p lithotripsy to right renal stone (nonobstructing) and s/p stent removal   - Now presenting with worsening creatinine and LEFT hydronephrosis on imaging with large heterogenous mass replacing the left kidney and hydroureteronephrosis to the level of a mass in the urinary bladder with additional disease deep in pelvis and heterogenous solid mass in the right kidney   - IR consulted for PCN tube placement however given the mass has replaced the left kidney unlikely to be contributing to MANISHA  - appreciate nephrology recs- MANISHA likely pre-renal hypovolemia related- has had diarrhea for 5 days which has now resolved. Improved creatinine with IV hydration   - continue with IVF    5. Anemia   - multifactorial in the setting of malignancy, blood loss   - sending iron studies, b12/folate   - He has had a few transfusions within the last month- given this history likely to drop further. Please optimize and transfuse 1 unit PRBC    68M hx urothelial cancer (on Enfortumab Vedotin and Pembrolizumab last treatment 12/13, s/p L ureteral stent s/p exchange, mets to lung), R kidney stones (s/p R ureteroscopy w/ stone removal 8/2023), BPH, parkinson disease (s/p DBS), hypothyroidism (thyroid cancer s/p partial thyroidectomy), presented to [facility] ED after MANISHA on outpatient labs at [facility] [place].       1. Metastatic Urothelial Carcinoma   - s/p gem/cis x4 cycles with interval increase in LNs, started q3wk pembro on 4/12/23  - s/p left renal mass biopsy showing SCC/urothelial carcinoma- IMPACT testing with HRAS G12V and TERT mutation    - was on q6wk pembro and transitioned to EV + Pembro- EV given on 12/6/23 and 12/13/23 with plan to resume combination treatment on 12/27/23   - most recent PET imaging prior to EV showing worsening b/l pulmonary mets with renal mass and LAD.   - CT A/P showing interval POD to the chest- recently had EV added to regimen for known worsening pulmonary mets   - no plans for inpatient treatment   [place] [facility] [place] Regis [place]azcano     2. Papillary Thyroid Carcinoma   - level 3 LN biopsy confirmed s/p partial thyroidectomy and left modified neck dissection with Dr. Della Colby on 10/17/2023- 2/26 LN metastatic papillary  - pathology revealing mixed type with medullary component- sporadic   - on levothyroxine 125mcg which he should continue   - monitoring TSH, Free T4, calcitonin and [place]A [place] [facility] [place] Penny Dailey which he can follow up after discharge    3. Hydronephrosis   4. Acute Kidney Injury   - prior RIGHT Hydro with conversion from NUT to PCN and internalization with Urologist Dr Nicholas Hernandez   - s/p lithotripsy to right renal stone (nonobstructing) and s/p stent removal   - Now presenting with worsening creatinine and LEFT hydronephrosis on imaging with large heterogenous mass replacing the left kidney and hydroureteronephrosis to the level of a mass in the urinary bladder with additional disease deep in pelvis and heterogenous solid mass in the right kidney   - IR consulted for PCN tube placement however given the mass has replaced the left kidney unlikely to be contributing to MANISHA  - appreciate nephrology recs- MANISHA likely pre-renal hypovolemia related- has had diarrhea for 5 days which has now resolved. Improved creatinine with IV hydration   - continue with IVF    5. Anemia   - multifactorial in the setting of malignancy, blood loss   - sending iron studies, b12/folate   - He has had a few transfusions within the last month- given this history likely to drop further. Please optimize and transfuse 1 unit PRBC

## 2023-12-24 NOTE — PROGRESS NOTE ADULT - PROBLEM SELECTOR PLAN 1
Likely 2/2 hypovolemia due to poor PO intake and diarrhea, less likely sepsis/GI or UTI infection. Recent course of levofloxacin outpatient for UTI.  - hypovolemia, improved with hydration   - UA, RVP neg  > f/u CXR, noted   > f/u Bcx, noted, negative

## 2023-12-24 NOTE — DISCHARGE NOTE PROVIDER - NSDCCPCAREPLAN_GEN_ALL_CORE_FT
PRINCIPAL DISCHARGE DIAGNOSIS  Diagnosis: MANISHA (acute kidney injury)  Assessment and Plan of Treatment: Avoid dehydration and nephrotoxic meds  Creatinine on discharge was 1.65      SECONDARY DISCHARGE DIAGNOSES  Diagnosis: Urothelial cancer  Assessment and Plan of Treatment: - CT A/P showing interval POD to the chest- recently had EV added to regimen for known worsening pulmonary mets   - [place] [facility] [place]az[place]       Diagnosis: Hypothyroidism  Assessment and Plan of Treatment: Continue levothyroxine 125mcg daily   - monitoring TSH, Free T4, calcitonin and CEA [place] [facility] [place] Dailey -follow up after discharge    Diagnosis: Hypotension  Assessment and Plan of Treatment: hold off on your blood pressure meds for now, check BP daily and follow up w/ PCP w/in 1 week to re evaluate the treatment plan     PRINCIPAL DISCHARGE DIAGNOSIS  Diagnosis: MANISHA (acute kidney injury)  Assessment and Plan of Treatment: Avoid dehydration and nephrotoxic meds  Creatinine on discharge was 1.65      SECONDARY DISCHARGE DIAGNOSES  Diagnosis: Urothelial cancer  Assessment and Plan of Treatment: - CT A/P showing interval POD to the chest- recently had EV added to regimen for known worsening pulmonary mets   - follow up at [facility] [place] after discharge Dr Regis Lazcano       Diagnosis: Hypothyroidism  Assessment and Plan of Treatment: Continue levothyroxine 125mcg daily   - monitoring TSH, Free T4, calcitonin and CEA with [facility] [place] Dr. Penny Dailey -follow up after discharge    Diagnosis: Hypotension  Assessment and Plan of Treatment: hold off on your blood pressure meds for now, check BP daily and follow up w/ PCP w/in 1 week to re evaluate the treatment plan

## 2023-12-24 NOTE — PROGRESS NOTE ADULT - PROBLEM SELECTOR PLAN 2
2/2 hypotension, diarrhea/hypovolemia, ARB, ?sepsis  Baseline SCr 1.2  L renal mass a/w hydronephrosis, R renal mass non-obstructive  - renal sono at [facility] 12/22 w/o L hydro, mass increased in size from 10x8 on CT 9/6/23 to 21.5cm  - sp ~3L IVF in ED  - Urology consulted, no acute urologic intervention - hydro 2/2 tumor burden in non-functioning kidney, believed not to be contributing to MANISHA  > CTM SCr  > avoid nephrotoxic agents, renally dose medications to eGFR  > strict I/Os, daily weight  > nephrology, urology following  >improved renal function with IV hydration, cont  > improved renal function
2/2 hypotension, diarrhea/hypovolemia, ARB, ?sepsis  Baseline SCr 1.2  L renal mass a/w hydronephrosis, R renal mass non-obstructive  - renal sono at [facility] 12/22 w/o L hydro, mass increased in size from 10x8 on CT 9/6/23 to 21.5cm  - sp ~3L IVF in ED  - Urology consulted, no acute urologic intervention - hydro 2/2 tumor burden in non-functioning kidney, believed not to be contributing to MANISHA  > CTM SCr  > avoid nephrotoxic agents, renally dose medications to eGFR  > strict I/Os, daily weight  > nephrology, urology following  >improved renal function with IV hydration, cont

## 2023-12-24 NOTE — DISCHARGE NOTE NURSING/CASE MANAGEMENT/SOCIAL WORK - NSDCPEFALRISK_GEN_ALL_CORE
For information on Fall & Injury Prevention, visit: [email or web address] OR  [email or web address] OR  https://www.cdc.gov/steadi/patient.html For information on Fall & Injury Prevention, visit: [email or web address] OR  [email or web address] OR  https://www.cdc.gov/steadi/patient.html

## 2023-12-24 NOTE — ED ADULT NURSE NOTE - CHIEF COMPLAINT QUOTE
fell *3 times- in the last 2 hours- just got discharged from [facility] today- c/o lower backpain, did not hit head, no LOC- being treated for lover cancer fell *3 times- in the last 2 hours- just got discharged from [facility] today- c/o lower backpain, did not hit head, no LOC- being treated for lover cancer

## 2023-12-24 NOTE — ED ADULT TRIAGE NOTE - CHIEF COMPLAINT QUOTE
fell *3 times- in the last 2 hours- just got discharged from [facility] today- c/o lower backpain, did not hit head, no LOC- being treated for lover cancer fell *3 times- in the last 2 hours- just got discharged from [facility] today- c/o lower backpain, did not hit head, no LOC- being treated for lover cancer

## 2023-12-24 NOTE — PROGRESS NOTE ADULT - PROBLEM SELECTOR PLAN 8
2/2 chronic disease / malignancy  > CTM on CBC qD  > T+S; transfusion of 1 unit per onc recs
2/2 chronic disease / malignancy  > CTM on CBC qD  > T+S; transfusion of 1 unit per onc recs

## 2023-12-24 NOTE — PROGRESS NOTE ADULT - SUBJECTIVE AND OBJECTIVE BOX
[facility] DIVISION OF KIDNEY DISEASES AND HYPERTENSION   FOLLOW UP NOTE  --------------------------------------------------------------------------------  Chief Complaint: MANISHA    24 hour events/subjective: Pt. was seen and examined at bedside. He is feeling better, diarrhea has markedly improved. Urinating without difficulty.     PAST HISTORY  --------------------------------------------------------------------------------  No significant changes to PMH, PSH, FHx, SHx, unless otherwise noted    ALLERGIES & MEDICATIONS  --------------------------------------------------------------------------------  Allergies  No Known Allergies    Intolerances    Standing Inpatient Medications  aspirin enteric coated 81 milliGRAM(s) Oral daily  atorvastatin 20 milliGRAM(s) Oral at bedtime  carbidopa/levodopa  25/250 0.5 Tablet(s) Oral four times a day  heparin   Injectable 5000 Unit(s) SubCutaneous every 8 hours  lactated ringers. 1000 milliLiter(s) IV Continuous <Continuous>  levothyroxine 125 MICROGram(s) Oral daily  magnesium oxide 400 milliGRAM(s) Oral three times a day with meals  mupirocin 2% Ointment 1 Application(s) Topical two times a day  tamsulosin 0.8 milliGRAM(s) Oral at bedtime    PRN Inpatient Medications  acetaminophen     Tablet .. 650 milliGRAM(s) Oral every 6 hours PRN  aluminum hydroxide/magnesium hydroxide/simethicone Suspension 30 milliLiter(s) Oral every 4 hours PRN  melatonin 3 milliGRAM(s) Oral at bedtime PRN  ondansetron Injectable 4 milliGRAM(s) IV Push every 8 hours PRN  senna 2 Tablet(s) Oral at bedtime PRN    REVIEW OF SYSTEMS  --------------------------------------------------------------------------------  All other systems were reviewed and are negative, except as noted.    VITALS/PHYSICAL EXAM  --------------------------------------------------------------------------------  T(C): 36.6 (12-24-23 @ 12:46), Max: 36.9 (12-23-23 @ 18:16)  HR: 81 (12-24-23 @ 12:46) (72 - 81)  BP: 102/58 (12-24-23 @ 12:46) (102/58 - 130/62)  RR: 18 (12-24-23 @ 12:46) (18 - 22)  SpO2: 95% (12-24-23 @ 12:46) (91% - 95%)  Wt(kg): --  Height (cm): 175.3 (12-23-23 @ 20:51)  Weight (kg): 121.4 (12-23-23 @ 20:51)  BMI (kg/m2): 39.5 (12-23-23 @ 20:51)  BSA (m2): 2.34 (12-23-23 @ 20:51)    Physical Exam:  Gen: Sitting up in bed in NAD  HEENT: Anicteric  Pulm: CTA B/L  CV: S1S2  Abd: Soft, +BS   Ext: Trace LE edema  Neuro: Awake and alert  Skin: Warm and dry    LABS/STUDIES  --------------------------------------------------------------------------------              8.3    7.51  >-----------<  333      [12-24-23 @ 05:32]              26.2     136  |  103  |  22  ----------------------------<  88      [12-24-23 @ 05:32]  2.9   |  18  |  1.65        Ca     7.7     [12-24-23 @ 05:32]      Mg     1.80     [12-24-23 @ 05:32]      Phos  3.0     [12-24-23 @ 05:32]    TPro  6.1  /  Alb  2.6  /  TBili  1.0  /  DBili  x   /  AST  30  /  ALT  9   /  AlkPhos  109  [12-24-23 @ 05:32]    Creatinine Trend:  SCr 1.65 [12-24 @ 05:32]  SCr 2.76 [12-23 @ 06:52]  SCr 3.93 [12-22 @ 14:00] [facility] DIVISION OF KIDNEY DISEASES AND HYPERTENSION   FOLLOW UP NOTE  --------------------------------------------------------------------------------  Chief Complaint: MANISHA    24 hour events/subjective: Pt. was seen and examined at bedside. He is feeling better, diarrhea has markedly improved. Urinating without difficulty.     PAST HISTORY  --------------------------------------------------------------------------------  No significant changes to PMH, PSH, FHx, SHx, unless otherwise noted    ALLERGIES & MEDICATIONS  --------------------------------------------------------------------------------  Allergies  No Known Allergies    Intolerances    Standing Inpatient Medications  aspirin enteric coated 81 milliGRAM(s) Oral daily  atorvastatin 20 milliGRAM(s) Oral at bedtime  carbidopa/levodopa  25/250 0.5 Tablet(s) Oral four times a day  heparin   Injectable 5000 Unit(s) SubCutaneous every 8 hours  lactated ringers. 1000 milliLiter(s) IV Continuous <Continuous>  levothyroxine 125 MICROGram(s) Oral daily  magnesium oxide 400 milliGRAM(s) Oral three times a day with meals  mupirocin 2% Ointment 1 Application(s) Topical two times a day  tamsulosin 0.8 milliGRAM(s) Oral at bedtime    PRN Inpatient Medications  acetaminophen     Tablet .. 650 milliGRAM(s) Oral every 6 hours PRN  aluminum hydroxide/magnesium hydroxide/simethicone Suspension 30 milliLiter(s) Oral every 4 hours PRN  melatonin 3 milliGRAM(s) Oral at bedtime PRN  ondansetron Injectable 4 milliGRAM(s) IV Push every 8 hours PRN  senna 2 Tablet(s) Oral at bedtime PRN    REVIEW OF SYSTEMS  --------------------------------------------------------------------------------  All other systems were reviewed and are negative, except as noted.    VITALS/PHYSICAL EXAM  --------------------------------------------------------------------------------  T(C): 36.6 (12-24-23 @ 12:46), Max: 36.9 (12-23-23 @ 18:16)  HR: 81 (12-24-23 @ 12:46) (72 - 81)  BP: 102/58 (12-24-23 @ 12:46) (102/58 - 130/62)  RR: 18 (12-24-23 @ 12:46) (18 - 22)  SpO2: 95% (12-24-23 @ 12:46) (91% - 95%)  Wt(kg): --  Height (cm): 175.3 (12-23-23 @ 20:51)  Weight (kg): 121.4 (12-23-23 @ 20:51)  BMI (kg/m2): 39.5 (12-23-23 @ 20:51)  BSA (m2): 2.34 (12-23-23 @ 20:51)    Physical Exam:  Gen: Sitting up in bed in NAD  HEENT: Anicteric  Pulm: CTA B/L  CV: S1S2  Abd: Soft, +BS   Ext: Trace LE edema  Neuro: Awake and alert  Skin: Warm and dry    LABS/STUDIES  --------------------------------------------------------------------------------              8.3    7.51  >-----------<  333      [12-24-23 @ 05:32]              26.2     136  |  103  |  22  ----------------------------<  88      [12-24-23 @ 05:32]  2.9   |  18  |  1.65        Ca     7.7     [12-24-23 @ 05:32]      Mg     1.80     [12-24-23 @ 05:32]      Phos  3.0     [12-24-23 @ 05:32]    TPro  6.1  /  Alb  2.6  /  TBili  1.0  /  DBili  x   /  AST  30  /  ALT  9   /  AlkPhos  109  [12-24-23 @ 05:32]    Creatinine Trend:  SCr 1.65 [12-24 @ 05:32]  SCr 2.76 [12-23 @ 06:52]  SCr 3.93 [12-22 @ 14:00]

## 2023-12-24 NOTE — ED PROVIDER NOTE - CLINICAL SUMMARY MEDICAL DECISION MAKING FREE TEXT BOX
Weakness fatigue post recent hospitalization requiring thorough evaluation at which will also require IV fluids followed by social work evaluation in the morning to determine need for rehabilitation and local facility.

## 2023-12-24 NOTE — PROGRESS NOTE ADULT - PROBLEM SELECTOR PLAN 7
> cont home levothyroxine 125 daily   > f/u thyroid studies
> cont home levothyroxine 125 daily   > f/u thyroid studies

## 2023-12-24 NOTE — PROGRESS NOTE ADULT - ATTENDING COMMENTS
1. MANISHA on CKD - improved with hydration.  Remains on intravenous fluids.  2. Hyponatremia - improved  3. Hypokalemia - repletion provided this morning.  Repeat level pending  4. Anemia - transfusion as per Medicine/Heme services.  5. Metabolic acidosis - monitor with repeat testing.  Reviewed with patient at the bedside.

## 2023-12-24 NOTE — PROGRESS NOTE ADULT - PROBLEM SELECTOR PLAN 5
Patient has a DBS.  > if patient should require a MRI for any reason, please inquire with radiology group about MRI compatibility of device.  > cont home carbidope-levodopa  - Synthroid, adjusted dose 125 daily
Patient has a DBS.  > if patient should require a MRI for any reason, please inquire with radiology group about MRI compatibility of device.  > cont home carbidope-levodopa  - Synthroid, adjusted dose 125 daily

## 2023-12-24 NOTE — PHYSICAL THERAPY INITIAL EVALUATION ADULT - GENERAL OBSERVATIONS, REHAB EVAL
Pt received semi-supine in bed, +IV, all lines intact, in NAD, wife at bedside. Pt agreeable to participate in PT evaluation.

## 2023-12-24 NOTE — ED PROVIDER NOTE - DIFFERENTIAL DIAGNOSIS
Differential Diagnosis Weakness differential diagnosis including dehydration versus MANISHA versus anemia versus electrolyte abnormalities versus occult sepsis doubt

## 2023-12-24 NOTE — PROGRESS NOTE ADULT - PROBLEM SELECTOR PLAN 9
> senna, ASA ?primary ppx?    DVT PPx: SQH  Diet: S+BS  Code:   Dispo: PT/OT Pending Hospital Course  Communication:
> senna, ASA ?primary ppx?    DVT PPx: SQH  Diet: S+BS  Code:   Dispo: PT/OT Pending Hospital Course  Communication:    D/C planing

## 2023-12-24 NOTE — PROGRESS NOTE ADULT - REASON FOR ADMISSION
diarrhea, MANISHA on outpatient labs

## 2023-12-24 NOTE — PROGRESS NOTE ADULT - SUBJECTIVE AND OBJECTIVE BOX
Name of Patient : MAR WILSON  MRN: [id number]  Date of visit: 12-24-23      Subjective: Patient seen and examined. No new events except as noted.   doing okay     REVIEW OF SYSTEMS:    CONSTITUTIONAL: No weakness, fevers or chills  EYES/ENT: No visual changes;  No vertigo or throat pain   NECK: No pain or stiffness  RESPIRATORY: No cough, wheezing, hemoptysis; No shortness of breath  CARDIOVASCULAR: No chest pain or palpitations  GASTROINTESTINAL: No abdominal or epigastric pain. No nausea, vomiting, or hematemesis; No diarrhea or constipation. No melena or hematochezia.  GENITOURINARY: No dysuria, frequency or hematuria  NEUROLOGICAL: No numbness or weakness  SKIN: No itching, burning, rashes, or lesions   All other review of systems is negative unless indicated above.    MEDICATIONS:  MEDICATIONS  (STANDING):  aspirin enteric coated 81 milliGRAM(s) Oral daily  atorvastatin 20 milliGRAM(s) Oral at bedtime  carbidopa/levodopa  25/250 0.5 Tablet(s) Oral four times a day  heparin   Injectable 5000 Unit(s) SubCutaneous every 8 hours  lactated ringers. 1000 milliLiter(s) (100 mL/Hr) IV Continuous <Continuous>  levothyroxine 125 MICROGram(s) Oral daily  magnesium oxide 400 milliGRAM(s) Oral three times a day with meals  mupirocin 2% Ointment 1 Application(s) Topical two times a day  tamsulosin 0.8 milliGRAM(s) Oral at bedtime      PHYSICAL EXAM:  T(C): 36.6 (12-24-23 @ 12:46), Max: 36.9 (12-23-23 @ 18:16)  HR: 81 (12-24-23 @ 12:46) (72 - 81)  BP: 102/58 (12-24-23 @ 12:46) (102/58 - 130/62)  RR: 18 (12-24-23 @ 12:46) (18 - 22)  SpO2: 95% (12-24-23 @ 12:46) (91% - 95%)  Wt(kg): --  I&O's Summary    Height (cm): 175.3 (12-23 @ 20:51)  Weight (kg): 121.4 (12-23 @ 20:51)  BMI (kg/m2): 39.5 (12-23 @ 20:51)  BSA (m2): 2.34 (12-23 @ 20:51)    Appearance: Normal	  HEENT:  PERRLA   Lymphatic: No lymphadenopathy   Cardiovascular: Normal S1 S2, no JVD  Respiratory: normal effort , clear  Gastrointestinal:  Soft, Non-tender  Skin: No rashes,  warm to touch  Psychiatry:  Mood & affect appropriate  Musculuskeletal: No edema    recent labs, Imaging and EKGs personally reviewed                         8.3    7.51  )-----------( 333      ( 24 Dec 2023 05:32 )             26.2               12-24    136  |  103  |  22  ----------------------------<  88  2.9<LL>   |  18<L>  |  1.65<H>    Ca    7.7<L>      24 Dec 2023 05:32  Phos  3.0     12-24  Mg     1.80     12-24    TPro  6.1  /  Alb  2.6<L>  /  TBili  1.0  /  DBili  x   /  AST  30  /  ALT  9   /  AlkPhos  109  12-24                       Urinalysis Basic - ( 24 Dec 2023 05:32 )    Color: x / Appearance: x / SG: x / pH: x  Gluc: 88 mg/dL / Ketone: x  / Bili: x / Urobili: x   Blood: x / Protein: x / Nitrite: x   Leuk Esterase: x / RBC: x / WBC x   Sq Epi: x / Non Sq Epi: x / Bacteria: x                       Name of Patient : MAR WILSON  MRN: [id number]  Date of visit: 12-24-23      Subjective: Patient seen and examined. No new events except as noted.   doing okay     REVIEW OF SYSTEMS:    CONSTITUTIONAL: No weakness, fevers or chills  EYES/ENT: No visual changes;  No vertigo or throat pain   NECK: No pain or stiffness  RESPIRATORY: No cough, wheezing, hemoptysis; No shortness of breath  CARDIOVASCULAR: No chest pain or palpitations  GASTROINTESTINAL: No abdominal or epigastric pain. No nausea, vomiting, or hematemesis; No diarrhea or constipation. No melena or hematochezia.  GENITOURINARY: No dysuria, frequency or hematuria  NEUROLOGICAL: No numbness or weakness  SKIN: No itching, burning, rashes, or lesions   All other review of systems is negative unless indicated above.    MEDICATIONS:  MEDICATIONS  (STANDING):  aspirin enteric coated 81 milliGRAM(s) Oral daily  atorvastatin 20 milliGRAM(s) Oral at bedtime  carbidopa/levodopa  25/250 0.5 Tablet(s) Oral four times a day  heparin   Injectable 5000 Unit(s) SubCutaneous every 8 hours  lactated ringers. 1000 milliLiter(s) (100 mL/Hr) IV Continuous <Continuous>  levothyroxine 125 MICROGram(s) Oral daily  magnesium oxide 400 milliGRAM(s) Oral three times a day with meals  mupirocin 2% Ointment 1 Application(s) Topical two times a day  tamsulosin 0.8 milliGRAM(s) Oral at bedtime      PHYSICAL EXAM:  T(C): 36.6 (12-24-23 @ 12:46), Max: 36.9 (12-23-23 @ 18:16)  HR: 81 (12-24-23 @ 12:46) (72 - 81)  BP: 102/58 (12-24-23 @ 12:46) (102/58 - 130/62)  RR: 18 (12-24-23 @ 12:46) (18 - 22)  SpO2: 95% (12-24-23 @ 12:46) (91% - 95%)  Wt(kg): --  I&O's Summary    Height (cm): 175.3 (12-23 @ 20:51)  Weight (kg): 121.4 (12-23 @ 20:51)  BMI (kg/m2): 39.5 (12-23 @ 20:51)  BSA (m2): 2.34 (12-23 @ 20:51)    Appearance: Normal	  HEENT:  PERRLA   Lymphatic: No lymphadenopathy   Cardiovascular: Normal S1 S2, no JVD  Respiratory: normal effort , clear  Gastrointestinal:  Soft, Non-tender  Skin: No rashes,  warm to touch  Psychiatry:  Mood & affect appropriate  Musculuskeletal: No edema    recent labs, Imaging and EKGs personally reviewed                         8.3    7.51  )-----------( 333      ( 24 Dec 2023 05:32 )             26.2               12-24    136  |  103  |  22  ----------------------------<  88  2.9<LL>   |  18<L>  |  1.65<H>    Ca    7.7<L>      24 Dec 2023 05:32  Phos  3.0     12-24  Mg     1.80     12-24    TPro  6.1  /  Alb  2.6<L>  /  TBili  1.0  /  DBili  x   /  AST  30  /  ALT  9   /  AlkPhos  109  12-24                       Urinalysis Basic - ( 24 Dec 2023 05:32 )    Color: x / Appearance: x / SG: x / pH: x  Gluc: 88 mg/dL / Ketone: x  / Bili: x / Urobili: x   Blood: x / Protein: x / Nitrite: x   Leuk Esterase: x / RBC: x / WBC x   Sq Epi: x / Non Sq Epi: x / Bacteria: x

## 2023-12-24 NOTE — PROGRESS NOTE ADULT - ASSESSMENT
68M hx urothelial cancer (on Enfortumab Vedotin and Pembrolizumab last treatment 12/13, s/p L ureteral stent s/p exchange, mets to lung), R kidney stones (s/p R ureteroscopy w/ stone removal 8/2023), BPH, parkinson disease (s/p DBS), hypothyroidism (thyroid cancer s/p partial thyroidectomy), presented to [facility] ED after MANISHA on outpatient labs at [facility].       1. Metastatic Urothelial Carcinoma   - s/p gem/cis x4 cycles with interval increase in LNs, started q3wk pembro on 4/12/23  - s/p left renal mass biopsy showing SCC/urothelial carcinoma- IMPACT testing with HRAS G12V and TERT mutation    - was on q6wk pembro and transitioned to EV + Pembro- EV given on 12/6/23 and 12/13/23 with plan to resume combination treatment on 12/27/23   - most recent PET imaging prior to EV showing worsening b/l pulmonary mets with renal mass and LAD.   - CT A/P showing interval POD to the chest- recently had EV added to regimen for known worsening pulmonary mets   - no plans for inpatient treatment   - [place] [facility] [place] Regis [place]azcano     2. Papillary Thyroid Carcinoma   - level 3 LN biopsy confirmed s/p partial thyroidectomy and left modified neck dissection with Dr. Della Colby on 10/17/2023- 2/26 LN metastatic papillary  - pathology revealing mixed type with medullary component- sporadic   - on levothyroxine 125mcg which he should continue   - monitoring TSH, Free T4, calcitonin and CEA with [facility] [place] Penny Dailey which he can follow up after discharge    3. Hydronephrosis   4. Acute Kidney Injury   - prior RIGHT Hydro with conversion from NUT to PCN and internalization with Urologist Dr Nicholas Hernandez   - s/p lithotripsy to right renal stone (nonobstructing) and s/p stent removal   - Now presenting with worsening creatinine and LEFT hydronephrosis on imaging with large heterogenous mass replacing the left kidney and hydroureteronephrosis to the level of a mass in the urinary bladder with additional disease deep in pelvis and heterogenous solid mass in the right kidney   - IR consulted for PCN tube placement however given the mass has replaced the left kidney unlikely to be contributing to MANISHA  - appreciate nephrology recs- MANISHA likely pre-renal hypovolemia related- has had diarrhea for 5 days which has now resolved. Improved creatinine with IV hydration   - continue with IVF    5. Anemia   - multifactorial in the setting of malignancy, blood loss   - sending iron studies, b12/folate   - s/p 1 unit PRBC with improvement in hemoglobin to 8.3       Creatinine much improved with IV hydration. K low- supplement. HGB improved with PRBC. No C/I for discharge from oncologic perspective after K replenished.    Tomy Jordan MD   Hematology/Oncology   [facility]  68M hx urothelial cancer (on Enfortumab Vedotin and Pembrolizumab last treatment 12/13, s/p L ureteral stent s/p exchange, mets to lung), R kidney stones (s/p R ureteroscopy w/ stone removal 8/2023), BPH, parkinson disease (s/p DBS), hypothyroidism (thyroid cancer s/p partial thyroidectomy), presented to [facility] ED after MANISHA on outpatient labs at [facility].       1. Metastatic Urothelial Carcinoma   - s/p gem/cis x4 cycles with interval increase in LNs, started q3wk pembro on 4/12/23  - s/p left renal mass biopsy showing SCC/urothelial carcinoma- IMPACT testing with HRAS G12V and TERT mutation    - was on q6wk pembro and transitioned to EV + Pembro- EV given on 12/6/23 and 12/13/23 with plan to resume combination treatment on 12/27/23   - most recent PET imaging prior to EV showing worsening b/l pulmonary mets with renal mass and LAD.   - CT A/P showing interval POD to the chest- recently had EV added to regimen for known worsening pulmonary mets   - no plans for inpatient treatment   - follow up at [facility] after discharge Dr Regis Lazcano     2. Papillary Thyroid Carcinoma   - level 3 LN biopsy confirmed s/p partial thyroidectomy and left modified neck dissection with Dr. Della Colby on 10/17/2023- 2/26 LN metastatic papillary  - pathology revealing mixed type with medullary component- sporadic   - on levothyroxine 125mcg which he should continue   - monitoring TSH, Free T4, calcitonin and CEA with [facility] Dr. Penny Dailey which he can follow up after discharge    3. Hydronephrosis   4. Acute Kidney Injury   - prior RIGHT Hydro with conversion from NUT to PCN and internalization with Urologist Dr Nicholas Hernandez   - s/p lithotripsy to right renal stone (nonobstructing) and s/p stent removal   - Now presenting with worsening creatinine and LEFT hydronephrosis on imaging with large heterogenous mass replacing the left kidney and hydroureteronephrosis to the level of a mass in the urinary bladder with additional disease deep in pelvis and heterogenous solid mass in the right kidney   - IR consulted for PCN tube placement however given the mass has replaced the left kidney unlikely to be contributing to MANISHA  - appreciate nephrology recs- MANISHA likely pre-renal hypovolemia related- has had diarrhea for 5 days which has now resolved. Improved creatinine with IV hydration   - continue with IVF    5. Anemia   - multifactorial in the setting of malignancy, blood loss   - sending iron studies, b12/folate   - s/p 1 unit PRBC with improvement in hemoglobin to 8.3       Creatinine much improved with IV hydration. K low- supplement. HGB improved with PRBC. No C/I for discharge from oncologic perspective after K replenished.    Tomy Jordan MD   Hematology/Oncology   [facility]

## 2023-12-24 NOTE — DISCHARGE NOTE NURSING/CASE MANAGEMENT/SOCIAL WORK - PATIENT PORTAL LINK FT
You can access the FollowMyHealth Patient Portal offered by [facility] by registering at the following website: [email or web address]. By joining [employer]’s FollowMyHealth portal, you will also be able to view your health information using other applications (apps) compatible with our system. You can access the FollowMyHealth Patient Portal offered by [facility] by registering at the following website: [email or web address]. By joining [employer]’s FollowMyHealth portal, you will also be able to view your health information using other applications (apps) compatible with our system.

## 2023-12-24 NOTE — ED PROVIDER NOTE - PHYSICAL EXAMINATION
Examination reveals a well-developed well-nourished overweight white male somewhat lethargic in appearance but in no acute distress.  HEENT normocephalic atraumatic pupils equal round reactive light commendation extract moods intact neck is supple no meningismus lungs are clear heart is regular rate and rhythm without any murmurs rubs gallops chest wall reveals a port subcutaneous left upper chest wall beneath left clavicle site of which is clean and dry abdomen is soft nontender positive bowel sounds extremities are free from any clubbing cyanosis or edema neurologically alert oriented x 4 without any focal neurologic deficits

## 2023-12-24 NOTE — PHYSICAL THERAPY INITIAL EVALUATION ADULT - ADDITIONAL COMMENTS
Pt lives in a condo with wife, no stairs to negotiate, was ambulating using a rollator short distances would use it as a chair if needed to rest. Per wife, pt also utilized a wheelchair to mobilize. Pt requires some assistance to perform ADLs depending on his level of energy.     Pt left semi-supine in bed, all lines intact, all needs in reach, bed alarm set, in NAD. PILAR Lind aware. Heart rate 81 beats per minute.

## 2023-12-24 NOTE — PHYSICAL THERAPY INITIAL EVALUATION ADULT - PERTINENT HX OF CURRENT PROBLEM, REHAB EVAL
68 year old Male with history stated below, presented to [facility] ED after MANISHA on outpatient labs [place] [facility]. 68 year old Male with history stated below, presented to [facility] ED after MANISHA on outpatient labs at [facility].

## 2023-12-25 DIAGNOSIS — C79.10 SECONDARY MALIGNANT NEOPLASM OF UNSPECIFIED URINARY ORGANS: ICD-10-CM

## 2023-12-25 DIAGNOSIS — C73 MALIGNANT NEOPLASM OF THYROID GLAND: ICD-10-CM

## 2023-12-25 DIAGNOSIS — I10 ESSENTIAL (PRIMARY) HYPERTENSION: ICD-10-CM

## 2023-12-25 DIAGNOSIS — N17.9 ACUTE KIDNEY FAILURE, UNSPECIFIED: ICD-10-CM

## 2023-12-25 DIAGNOSIS — Z29.9 ENCOUNTER FOR PROPHYLACTIC MEASURES, UNSPECIFIED: ICD-10-CM

## 2023-12-25 DIAGNOSIS — Z86.69 PERSONAL HISTORY OF OTHER DISEASES OF THE NERVOUS SYSTEM AND SENSE ORGANS: ICD-10-CM

## 2023-12-25 DIAGNOSIS — D63.8 ANEMIA IN OTHER CHRONIC DISEASES CLASSIFIED ELSEWHERE: ICD-10-CM

## 2023-12-25 DIAGNOSIS — R53.1 WEAKNESS: ICD-10-CM

## 2023-12-25 LAB
APPEARANCE UR: CLEAR — SIGNIFICANT CHANGE UP
APPEARANCE UR: CLEAR — SIGNIFICANT CHANGE UP
BILIRUB UR-MCNC: NEGATIVE — SIGNIFICANT CHANGE UP
BILIRUB UR-MCNC: NEGATIVE — SIGNIFICANT CHANGE UP
COLOR SPEC: YELLOW — SIGNIFICANT CHANGE UP
COLOR SPEC: YELLOW — SIGNIFICANT CHANGE UP
DIFF PNL FLD: ABNORMAL
DIFF PNL FLD: ABNORMAL
GLUCOSE UR QL: NEGATIVE MG/DL — SIGNIFICANT CHANGE UP
GLUCOSE UR QL: NEGATIVE MG/DL — SIGNIFICANT CHANGE UP
KETONES UR-MCNC: NEGATIVE MG/DL — SIGNIFICANT CHANGE UP
KETONES UR-MCNC: NEGATIVE MG/DL — SIGNIFICANT CHANGE UP
LEUKOCYTE ESTERASE UR-ACNC: ABNORMAL
LEUKOCYTE ESTERASE UR-ACNC: ABNORMAL
NITRITE UR-MCNC: NEGATIVE — SIGNIFICANT CHANGE UP
NITRITE UR-MCNC: NEGATIVE — SIGNIFICANT CHANGE UP
PH UR: 5.5 — SIGNIFICANT CHANGE UP (ref 5–8)
PH UR: 5.5 — SIGNIFICANT CHANGE UP (ref 5–8)
PROT UR-MCNC: 30 MG/DL
PROT UR-MCNC: 30 MG/DL
SP GR SPEC: 1.01 — SIGNIFICANT CHANGE UP (ref 1–1.03)
SP GR SPEC: 1.01 — SIGNIFICANT CHANGE UP (ref 1–1.03)
UROBILINOGEN FLD QL: 1 MG/DL — SIGNIFICANT CHANGE UP (ref 0.2–1)
UROBILINOGEN FLD QL: 1 MG/DL — SIGNIFICANT CHANGE UP (ref 0.2–1)

## 2023-12-25 PROCEDURE — 99222 1ST HOSP IP/OBS MODERATE 55: CPT | Mod: GC

## 2023-12-25 RX ORDER — HYDROMORPHONE HYDROCHLORIDE 2 MG/ML
0.5 INJECTION INTRAMUSCULAR; INTRAVENOUS; SUBCUTANEOUS ONCE
Refills: 0 | Status: DISCONTINUED | OUTPATIENT
Start: 2023-12-25 | End: 2023-12-25

## 2023-12-25 RX ORDER — METOPROLOL TARTRATE 50 MG
50 TABLET ORAL DAILY
Refills: 0 | Status: DISCONTINUED | OUTPATIENT
Start: 2023-12-25 | End: 2023-12-28

## 2023-12-25 RX ORDER — LANOLIN ALCOHOL/MO/W.PET/CERES
3 CREAM (GRAM) TOPICAL AT BEDTIME
Refills: 0 | Status: DISCONTINUED | OUTPATIENT
Start: 2023-12-25 | End: 2023-12-28

## 2023-12-25 RX ORDER — HEPARIN SODIUM 5000 [USP'U]/ML
5000 INJECTION INTRAVENOUS; SUBCUTANEOUS EVERY 8 HOURS
Refills: 0 | Status: DISCONTINUED | OUTPATIENT
Start: 2023-12-25 | End: 2023-12-25

## 2023-12-25 RX ORDER — ASPIRIN/CALCIUM CARB/MAGNESIUM 324 MG
81 TABLET ORAL DAILY
Refills: 0 | Status: DISCONTINUED | OUTPATIENT
Start: 2023-12-25 | End: 2023-12-28

## 2023-12-25 RX ORDER — HYDROMORPHONE HYDROCHLORIDE 2 MG/ML
0.2 INJECTION INTRAMUSCULAR; INTRAVENOUS; SUBCUTANEOUS
Refills: 0 | Status: DISCONTINUED | OUTPATIENT
Start: 2023-12-25 | End: 2023-12-28

## 2023-12-25 RX ORDER — ATORVASTATIN CALCIUM 80 MG/1
20 TABLET, FILM COATED ORAL AT BEDTIME
Refills: 0 | Status: DISCONTINUED | OUTPATIENT
Start: 2023-12-25 | End: 2023-12-28

## 2023-12-25 RX ORDER — HYDROMORPHONE HYDROCHLORIDE 2 MG/ML
0.5 INJECTION INTRAMUSCULAR; INTRAVENOUS; SUBCUTANEOUS
Refills: 0 | Status: DISCONTINUED | OUTPATIENT
Start: 2023-12-25 | End: 2023-12-28

## 2023-12-25 RX ORDER — ONDANSETRON 8 MG/1
4 TABLET, FILM COATED ORAL EVERY 8 HOURS
Refills: 0 | Status: DISCONTINUED | OUTPATIENT
Start: 2023-12-25 | End: 2023-12-28

## 2023-12-25 RX ORDER — DIPHENOXYLATE HCL/ATROPINE 2.5-.025MG
1 TABLET ORAL THREE TIMES A DAY
Refills: 0 | Status: DISCONTINUED | OUTPATIENT
Start: 2023-12-25 | End: 2023-12-28

## 2023-12-25 RX ORDER — ACETAMINOPHEN 500 MG
650 TABLET ORAL EVERY 6 HOURS
Refills: 0 | Status: DISCONTINUED | OUTPATIENT
Start: 2023-12-25 | End: 2023-12-28

## 2023-12-25 RX ORDER — CARBIDOPA AND LEVODOPA 25; 100 MG/1; MG/1
0.5 TABLET ORAL
Refills: 0 | Status: DISCONTINUED | OUTPATIENT
Start: 2023-12-25 | End: 2023-12-28

## 2023-12-25 RX ORDER — MAGNESIUM OXIDE 400 MG ORAL TABLET 241.3 MG
400 TABLET ORAL
Refills: 0 | Status: DISCONTINUED | OUTPATIENT
Start: 2023-12-25 | End: 2023-12-28

## 2023-12-25 RX ORDER — SODIUM CHLORIDE 9 MG/ML
1000 INJECTION INTRAMUSCULAR; INTRAVENOUS; SUBCUTANEOUS
Refills: 0 | Status: DISCONTINUED | OUTPATIENT
Start: 2023-12-25 | End: 2023-12-28

## 2023-12-25 RX ORDER — TAMSULOSIN HYDROCHLORIDE 0.4 MG/1
0.8 CAPSULE ORAL AT BEDTIME
Refills: 0 | Status: DISCONTINUED | OUTPATIENT
Start: 2023-12-25 | End: 2023-12-28

## 2023-12-25 RX ORDER — LEVOTHYROXINE SODIUM 125 MCG
125 TABLET ORAL DAILY
Refills: 0 | Status: DISCONTINUED | OUTPATIENT
Start: 2023-12-25 | End: 2023-12-27

## 2023-12-25 RX ADMIN — CARBIDOPA AND LEVODOPA 0.5 TABLET(S): 25; 100 TABLET ORAL at 16:11

## 2023-12-25 RX ADMIN — SODIUM CHLORIDE 125 MILLILITER(S): 9 INJECTION INTRAMUSCULAR; INTRAVENOUS; SUBCUTANEOUS at 13:48

## 2023-12-25 RX ADMIN — HYDROMORPHONE HYDROCHLORIDE 0.5 MILLIGRAM(S): 2 INJECTION INTRAMUSCULAR; INTRAVENOUS; SUBCUTANEOUS at 19:45

## 2023-12-25 RX ADMIN — HYDROMORPHONE HYDROCHLORIDE 0.5 MILLIGRAM(S): 2 INJECTION INTRAMUSCULAR; INTRAVENOUS; SUBCUTANEOUS at 06:59

## 2023-12-25 RX ADMIN — HYDROMORPHONE HYDROCHLORIDE 0.5 MILLIGRAM(S): 2 INJECTION INTRAMUSCULAR; INTRAVENOUS; SUBCUTANEOUS at 12:00

## 2023-12-25 RX ADMIN — MAGNESIUM OXIDE 400 MG ORAL TABLET 400 MILLIGRAM(S): 241.3 TABLET ORAL at 16:13

## 2023-12-25 RX ADMIN — HYDROMORPHONE HYDROCHLORIDE 0.5 MILLIGRAM(S): 2 INJECTION INTRAMUSCULAR; INTRAVENOUS; SUBCUTANEOUS at 20:00

## 2023-12-25 NOTE — PATIENT PROFILE ADULT - SW.
social work Dr Justin Johnson [phone]  Cardiologist Dr. Babin, JUDY  Telephone [phone], Pulmonologist Dr. Oneal  Telephone [phone] Dr Justin Johnson [phone]  Cardiologist Dr. Babin, JUDY  Telephone [phone], Pulmonologist Dr. Oneal  Telephone [phone], ENT Dr. Ruiz [phone]

## 2023-12-25 NOTE — ED ADULT NURSE REASSESSMENT NOTE - NS ED NURSE REASSESS COMMENT FT1
Patient's son is requesting continuos IV fluids. Dr Turk was made aware, per Dr Tukr patient doesn't require IV fluids because he can drink oral fluids. Patient's son is requesting continuos IV fluids. Dr Turk was made aware, per Dr Turk patient doesn't require IV fluids because he can drink oral fluids. Patient's son is requesting continuos IV fluids. Dr Turk was made aware

## 2023-12-25 NOTE — H&P ADULT - PROBLEM SELECTOR PLAN 4
Baseline SCr 1.2  L renal mass a/w hydronephrosis, R renal mass non-obstructive  [place] [facility]o [place] hydro, mass increased in size from 10x8 on CT  9/6/23 to 21.5cm  - Maintenance IVF in ED  - Urology consulted at [facility] 12/22, no acute urologic intervention - hydro 2/2 tumor burden in  non-functioning kidney, believed not to be contributing to MANISHA  - trend creatinine  - nephrology  - cont home tamsulosin. Baseline SCr 1.2  L renal mass a/w hydronephrosis, R renal mass non-obstructive  - renal sono at [facility] [place] w/o L hydro, mass increased in size from 10x8 on CT  9/6/23 to 21.5cm  - Maintenance IVF in ED  - Urology consulted at [facility] 12/22, no acute urologic intervention - hydro 2/2 tumor burden in  non-functioning kidney, believed not to be contributing to MANISHA  - trend creatinine  - nephrology  - cont home tamsulosin.

## 2023-12-25 NOTE — CARE COORDINATION ASSESSMENT. - OTHER PERTINENT DISCHARGE PLANNING INFORMATION:
Pt re admitted from home s/p fall. He was recently admitted to [facility] and was dc home with home care. Pt has Stage IV urothelial cancer with mets to lungs and Parkinson's. Pt has had decreased p.o intake and trouble with hard foods and was not drinking. Wife reported that pt has been anxious and afraid of falling. [place] has [place] [facility] [place], and hc through [facility]. Wife requesting patricia at either , [facility] or [facility] (she is unsure about which is her first choice), and Peyton GUILLAUME requested.  Pt re admitted from home s/p fall. He was recently admitted to [facility] and was dc home with home care. Pt has Stage IV urothelial cancer with mets to lungs and Parkinson's. Pt has had decreased p.o intake and trouble with hard foods and was not drinking. Wife reported that pt has been anxious and afraid of falling. He has been receiving chemo at [facility] in [place] and [place], and hc through [facility]. Wife requesting patricia at either , [facility] or [facility] (she is unsure about which is her first choice), and Peyton GUILLAUME requested.

## 2023-12-25 NOTE — CARE COORDINATION ASSESSMENT. - NSPASTMEDSURGHISTORY_GEN_ALL_CORE_FT
PAST MEDICAL & SURGICAL HISTORY:  Medullary thyroid carcinoma      Hypernephroma      No significant past surgical history

## 2023-12-25 NOTE — H&P ADULT - NSHPPHYSICALEXAM_GEN_ALL_CORE
CONSTITUTIONAL: Well groomed, no apparent distress  EYES: No conjunctival or scleral injection, non-icteric  ENMT: Oral mucosa with moist membranes.   NECK: Supple, symmetric and without tracheal deviation   RESP: No respiratory distress, no use of accessory muscles; CTA b/l, no WRR  CV: RRR, +S1S2, no peripheral edema  GI: Soft, NT, ND  LYMPH: No cervical LAD or tenderness  MSK: Normal ROM without pain, no joint pain   SKIN: No rashes or ulcers noted  NEURO: Sensation intact in upper and lower extremities b/l to light touch   PSYCH: Appropriate insight/judgment; A+O x 3, mood and affect appropriate

## 2023-12-25 NOTE — H&P ADULT - NSHPSOCIALHISTORY_GEN_ALL_CORE
Tobacco: denies  EtOH: denies  Recreational drug use: denies  Lives with: family  Ambulates: w/ assistance (walker)  ADLs: w/ assistance

## 2023-12-25 NOTE — H&P ADULT - ATTENDING COMMENTS
68M hx urothelial cancer (on Enfortumab Vedotin and Pembrolizumab last treatment 12/13, s/p L ureteral stent s/p exchange, mets to lung), R kidney stones (s/p R ureteroscopy w/ stone removal 8/2023), BPH, parkinson disease (s/p DBS), hypothyroidism (thyroid cancer s/p partial thyroidectomy), presented to ED for weakness and fatigue.     Family can't care for patient, social work and case management to arrange dispo.  PT eval.  F/u TFTs.  IVF NS.    Claude Hernandez, Attending Physician

## 2023-12-25 NOTE — PATIENT CHOICE NOTE. - NSPTCHOICESTATE_GEN_ALL_CORE
I have met with the patient and/or caregiver to discuss discharge goals and treatment plan. Patient and/or caregiver also provided with instructions on accessing the CMS Compare websites for additional information related to Post Acute Provider quality and resource use measures to assist them in evaluation of the providers and in selecting their post-acute provider of choice. Patient and caregiver were informed of the facilities that are owned and/or operated by [facility]. I have discussed with the patient the availability of in-network facilities and providers. Patient and caregiver provided with a list of post-acute providers whose services are appropriate to the discharge plans and patient needs.     For patient requiring durable medical equipment, patient and/or caregiver were informed that they have the right to request who provides the required equipment. I have met with the patient and/or caregiver to discuss discharge goals and treatment plan. Patient and/or caregiver also provided with instructions on accessing the CMS Compare websites for additional information related to Post Acute Provider quality and resource use measures to assist them in evaluation of the providers and in selecting their post-acute provider of choice. Patient and caregiver were informed of the facilities that are owned and/or operated by [facility]. I have discussed with the patient the availability of in-network facilities and providers. Patient and caregiver provided with a list of post-acute providers whose services are appropriate to the discharge plans and patient needs.     For patient requiring durable medical equipment, patient and/or caregiver were informed that they have the right to request who provides the required equipment.

## 2023-12-25 NOTE — ED ADULT NURSE REASSESSMENT NOTE - NS ED NURSE REASSESS COMMENT FT1
pt waiting for social work this morning. Pt is c/o lower back pain. Pt in no acute distress. Pt updated on plan of care.

## 2023-12-25 NOTE — ED ADULT NURSE REASSESSMENT NOTE - NSFALLRISKINTERV_ED_ALL_ED
Assistance OOB with selected safe patient handling equipment if applicable/Communicate fall risk and risk factors to all staff, patient, and family/Provide visual cue: yellow wristband, yellow gown, etc/Reinforce activity limits and safety measures with patient and family/Toileting schedule using arm’s reach rule for commode and bathroom/Call bell, personal items and telephone in reach/Instruct patient to call for assistance before getting out of bed/chair/stretcher/Non-slip footwear applied when patient is off stretcher/[place] to call system/Physically safe environment - no spills, clutter or unnecessary equipment/Purposeful Proactive Rounding/Room/bathroom lighting operational, light cord in reach Assistance OOB with selected safe patient handling equipment if applicable/Communicate fall risk and risk factors to all staff, patient, and family/Provide visual cue: yellow wristband, yellow gown, etc/Reinforce activity limits and safety measures with patient and family/Toileting schedule using arm’s reach rule for commode and bathroom/Call bell, personal items and telephone in reach/Instruct patient to call for assistance before getting out of bed/chair/stretcher/Non-slip footwear applied when patient is off stretcher/[place] to call system/Physically safe environment - no spills, clutter or unnecessary equipment/Purposeful Proactive Rounding/Room/bathroom lighting operational, light cord in reach

## 2023-12-25 NOTE — H&P ADULT - PROBLEM SELECTOR PLAN 5
2/2 chronic disease / malignancy  - trend CBC, f/u iron studies  - Type and screen  - transfusion goal >7

## 2023-12-25 NOTE — PATIENT PROFILE ADULT - FALL HARM RISK - HARM RISK INTERVENTIONS
34 y/o female s/p missed  who presented to ED with LLE pain and swelling that started at midnight. Patient was able to ambulate when pain first started but is currently unable to move LLE. On exam, patient's LLE is tender to palpation, erythematous, edematous, and has a DP of 1+ compared to RLE. On Duplex, patient was found to have DVT of left external iliac, common femoral, femoral, deep femoral, popliteal, posterior tibial, anterior tibial along with thrombus in gastrocnemius vein along with superficial thrombus.     Plan:     - Patient is added on to OR as an emergency thrombectomy   - Admit to hospital  - Heparin drip started   - NPO   - Covid test       Vascular spectra: 8583 Assistance with ambulation/Assistance OOB with selected safe patient handling equipment/Communicate Risk of Fall with Harm to all staff/Discuss with provider need for PT consult/Monitor gait and stability/Provide patient with walking aids - walker, cane, crutches/Reinforce activity limits and safety measures with patient and family/Tailored Fall Risk Interventions/Visual Cue: Yellow wristband and red socks/Bed in lowest position, wheels locked, appropriate side rails in place/Call bell, personal items and telephone in reach/Instruct patient to call for assistance before getting out of bed or chair/Non-slip footwear when patient is out of bed/[place] to call system/Physically safe environment - no spills, clutter or unnecessary equipment/Purposeful Proactive Rounding/Room/bathroom lighting operational, light cord in reach Assistance with ambulation/Assistance OOB with selected safe patient handling equipment/Communicate Risk of Fall with Harm to all staff/Discuss with provider need for PT consult/Monitor gait and stability/Provide patient with walking aids - walker, cane, crutches/Reinforce activity limits and safety measures with patient and family/Tailored Fall Risk Interventions/Visual Cue: Yellow wristband and red socks/Bed in lowest position, wheels locked, appropriate side rails in place/Call bell, personal items and telephone in reach/Instruct patient to call for assistance before getting out of bed or chair/Non-slip footwear when patient is out of bed/[place] to call system/Physically safe environment - no spills, clutter or unnecessary equipment/Purposeful Proactive Rounding/Room/bathroom lighting operational, light cord in reach

## 2023-12-25 NOTE — H&P ADULT - NSHPREVIEWOFSYSTEMS_GEN_ALL_CORE
REVIEW OF SYSTEMS:  CONSTITUTIONAL: ++ decreased oral intake  HENMT: ++ weakness, fatigue, No HA, lightheadedness/dizziness  RESPIRATORY: No cough, wheezing, hemoptysis; No shortness of breath.  CARDIOVASCULAR: No chest pain, palpitations.  GASTROINTESTINAL: No abdominal or epigastric pain. No nausea or vomiting; No diarrhea or constipation.  GENITOURINARY: No dysuria, changes in frequency, hematuria, or incontinence  NEUROLOGICAL: Baseline strength. Sensation intact bilaterally.  SKIN: No itching, rashes  MUSCULOSKELETAL: ++ low back and knee pain B/l REVIEW OF SYSTEMS:  CONSTITUTIONAL: ++ decreased oral intake  HENMT: ++ weakness, fatigue, No HA, lightheadedness/dizziness  RESPIRATORY: No cough, wheezing, hemoptysis; No shortness of breath.  CARDIOVASCULAR: No chest pain, palpitations.  GASTROINTESTINAL: No abdominal or epigastric pain. No nausea or vomiting; No diarrhea or constipation.  GENITOURINARY: No dysuria, changes in frequency, hematuria, or incontinence  NEUROLOGICAL: Baseline strength. Sensation intact bilaterally.  SKIN: No itching, rashes  MUSCULOSKELETAL: ++ low back and knee pain B/l, ++ b/l pain behind knee

## 2023-12-25 NOTE — H&P ADULT - HISTORY OF PRESENT ILLNESS
68M hx urothelial cancer (on Enfortumab Vedotin and Pembrolizumab last treatment 12/13, s/p L ureteral stent s/p exchange, mets to lung), R kidney stones (s/p R ureteroscopy w/ stone removal 8/2023), BPH, parkinson disease (s/p DBS), hypothyroidism (thyroid cancer s/p partial thyroidectomy), presented to ED for weakness and fatigue. Patient states that yesterday he was d/c from [facility] with home PT. Patient attempted to participate in home PT was uncomfortable with their walker. Patient states that later he has two episodes of his legs giving out from underneath him when trying to use their walker. He states he didn't fall, but slowly sat down on the ground and his family wasn't able to help him up so they called the ambulance. He states he has also had decreased PO intake and has trouble with hard foods.     Denies fever, chills, chest pain, palpitations, SOB, cough, abdominal pain, nausea, vomiting, diarrhea, constipation, urinary frequency, urgency, or dysuria, headaches, changes in vision, dizziness, numbness, tingling.      ED Course:   Vitals: BP: 115/58, HR: 75, Temp: 97.3, RR: 17, SpO2: 94% on 3 L NC  Labs: HGB: 8.4 Creatine: 1.60 EGFR: 47  UA: Urine RBC: 25, Urine Blood: Large  CXR: as per personal read, official read pending   EKG: Normal sinus rhythm  Received in the ED:  Dilaudid, Sinemet, IV 1L Bolus   68M hx urothelial cancer (on Enfortumab Vedotin and Pembrolizumab last treatment 12/13, s/p L ureteral stent s/p exchange, mets to lung), R kidney stones (s/p R ureteroscopy w/ stone removal 8/2023), BPH, parkinson disease (s/p DBS), hypothyroidism (thyroid cancer s/p partial thyroidectomy), presented to ED for weakness and fatigue. Patient states that yesterday he was d/c from [facility] with home PT. Patient attempted to participate in home PT was uncomfortable with their walker. Patient states that later he has two episodes of his legs giving out from underneath him when trying to use their walker. He states he didn't fall, but slowly sat down on the ground and his family wasn't able to help him up so they called the ambulance. He states he has also had decreased PO intake and has trouble with hard foods.     Denies fever, chills, chest pain, palpitations, SOB, cough, abdominal pain, nausea, vomiting, diarrhea, constipation, urinary frequency, urgency, or dysuria, headaches, changes in vision, dizziness, numbness, tingling.      ED Course:   Vitals: BP: 115/58, HR: 75, Temp: 97.3, RR: 17, SpO2: 94% on 3 L NC  Labs: HGB: 8.4 Creatine: 1.60 EGFR: 47  UA: Urine RBC: 25, Urine Blood: Large  CXR: as per personal read, official read pending   EKG: Normal sinus rhythm  Received in the ED:  Dilaudid, Sinemet, IV 1L Bolus   68M hx urothelial cancer (on Enfortumab Vedotin and Pembrolizumab last treatment 12/13, s/p L ureteral stent s/p exchange, mets to lung), R kidney stones (s/p R ureteroscopy w/ stone removal 8/2023), BPH, parkinson disease (s/p DBS), hypothyroidism (thyroid cancer s/p partial thyroidectomy), presented to ED for weakness and fatigue. Patient states that yesterday he was d/c from [facility] with home PT. Patient attempted to participate in home PT was uncomfortable with their walker. Patient states that later he has two episodes of his legs giving out from underneath him when trying to use their walker. He states he didn't fall, but slowly sat down on the ground and his family wasn't able to help him up so they called the ambulance. Patient's family would like evaluation for OG placement. He states he has also had decreased PO intake and has trouble with hard foods. Patient currently on 3 L NC, but states he just got it to make him more comfortable although was doing fine without it before.     Denies fever, chills, chest pain, palpitations, SOB, cough, abdominal pain, nausea, vomiting, constipation, urinary frequency, urgency, or dysuria, headaches, changes in vision, dizziness, numbness, tingling.      ED Course:   Vitals: BP: 115/58, HR: 75, Temp: 97.3, RR: 17, SpO2: 94% on 3 L NC  Labs: HGB: 8.4 Creatine: 1.60 EGFR: 47  UA: Urine RBC: 25, Urine Blood: Large  CXR: as per personal read, official read pending   EKG: Normal sinus rhythm  Received in the ED:  Dilaudid, Sinemet, IV 1L Bolus   68M hx urothelial cancer (on Enfortumab Vedotin and Pembrolizumab last treatment 12/13, s/p L ureteral stent s/p exchange, mets to lung), R kidney stones (s/p R ureteroscopy w/ stone removal 8/2023), BPH, parkinson disease (s/p DBS), hypothyroidism (thyroid cancer s/p partial thyroidectomy), presented to ED for weakness and fatigue. Patient states that yesterday he was d/c from [facility] with home PT. Patient attempted to participate in home PT was uncomfortable with their walker. Patient states that later he has two episodes of his legs giving out from underneath him when trying to use their walker. He states he didn't fall, but slowly sat down on the ground and his family wasn't able to help him up so they called the ambulance. Patient's family would like evaluation for OG placement. He states he has also had decreased PO intake and has trouble with hard foods. Patient currently on 3 L NC, but states he just got it to make him more comfortable although was doing fine without it before.     Denies fever, chills, chest pain, palpitations, SOB, cough, abdominal pain, nausea, vomiting, constipation, urinary frequency, urgency, or dysuria, headaches, changes in vision, dizziness, numbness, tingling.      ED Course:   Vitals: BP: 115/58, HR: 75, Temp: 97.3, RR: 17, SpO2: 94% on 3 L NC  Labs: HGB: 8.4 Creatine: 1.60 EGFR: 47  UA: Urine RBC: 25, Urine Blood: Large  CXR: as per personal read, official read pending   EKG: Normal sinus rhythm  Received in the ED:  Dilaudid, Sinemet, IV 1L Bolus

## 2023-12-25 NOTE — CARE COORDINATION ASSESSMENT. - NSCAREPROVIDERS_GEN_ALL_CORE_FT
CARE PROVIDERS:  Accepting Physician: Claude Hernandez  Access Services: Jerome Mckinnon  Administration: Mikki Santiago  Administration: Babatunde Jones  Admitting: Claude Hernandez  Attending: Claude Hernandez  Consultant: Tomy Jordan  ED Attending: Yaya Madsen  ED Nurse: Ulices Colbert  Emergency Medicine: Sd Sosa  Nurse: Nicci Gutierrez  Ordered: ADM, User  PCA/Nursing Assistant: Kodak Buitrago  Physical Therapy: Omar Chu  Primary Team: Daniel Alvarez  Primary Team: Lianna Andrews  Primary Team: Yaya Madsen  : Sharmila Franklin// Supp. Assoc.: Belkis Billy   CARE PROVIDERS:  Accepting Physician: Claude Hernandez  Access Services: Jerome Mckinnon  Administration: Mikki Santiago  Administration: Babatunde Jones  Admitting: Claude Hernandez  Attending: Claude Hernandez  Consultant: Tomy Jordan  ED Attending: Yaya Madsen  ED Nurse: Ulices Colbert  Emergency Medicine: Sd Sosa  Nurse: Nicci Gutierrez  Ordered: ADM, User  PCA/Nursing Assistant: Kodak Buitrago  Physical Therapy: Omar Chu  Primary Team: Daniel Alvarez  Primary Team: Lianna Andrews  Primary Team: Yaya Madsen  : Sharmila Franklin// Supp. Assoc.: Belkis Blily

## 2023-12-25 NOTE — CARE COORDINATION ASSESSMENT. - REASON FOR CONSULT
coordination of care/discharge planning/emotional/coping/adjustment issues/new chronic/terminal diagnosis

## 2023-12-25 NOTE — H&P ADULT - PROBLEM SELECTOR PLAN 1
Patient s/p d/c from [facility] 12/24, admitted to [place] for weakness, fatigue  - maintenance IVF for BP  - increase oral intake as tolerated, Nutrition consult  - PT eval for OG placement, OT eval  - b/l LE doppler for pain behind knees  - pain management as appropriate Patient s/p d/c from [facility] 12/24, admitted to [place] for weakness, fatigue  - maintenance IVF for BP  - increase oral intake as tolerated, Nutrition consult  - PT eval for OG placement, OT eval  - b/l LE doppler for pain behind knees  - pain management as appropriate

## 2023-12-25 NOTE — PATIENT PROFILE ADULT - NSPROPTRIGHTSUPPORTPHONE_GEN_A_NUR
75y female with PMH of asthma, COPD not on home O2, autoimmune hepatitis on prednisone and tacrolimus, heterozygous prothrombin gene mutation with hx of PE and DVT on coumadin presents to the hospital complaining of cough, hemoptysis, SOB and desaturation to 70s. Pt was found to be flu+ likely viral PNA complicated by diffuse alveolar hemorrhage and ARDS . Hospital course complicated by DVT in L saphenous vein with possible DVT and REJI likely secondary to ATN due to vanco.      #ARDS  1-alveolar hemorrhage   2-flu +ve with post viral pneumonia resolved   3-possibel PE: cant start AC due to alveolar hemorrhage    - intubated and sedated  - s/p DDAVP 3 doses   - Solumedrol 40 q24  - Lasix 40   - s/p cefepime and Levaquin (competed 7 days yesterday) - off Vanc >> kidneys toxicity  - completed oseltamivir 30 mg   - C/w Duoneb   - Bronch results - neg culture, neg fungal, cytology positive for inflammatory cells, no organisms  - c/w MIV, monitor for improved mentation to extubate  - Neurology consulted for poor SAT   - EEG   - If no improvement in 48 hours MRI     #Candidemia   - Exchanged CVC  - likely A line is source   - blood cultures grew candida  - daily blood cultures   - c/w caspofungin   - ID following  - fungitell pend  - procalcitonin pending  - trend CBC     #REJI oliguric likely ATN with Vanc toxicity   - acidosis resolved, UO improved   - Cr improving  - no need RRT for now  - Nephro following  - PTH elevated     - calcitriol 0.25  - daily BMP, f/u lytes   - BUN elevated despite downtrending Cr   - Dehydration vs over feeding vs medication induced   - steroids decreased   - IVF   - Nutrition consult for tube feeds    # Chronic? L Saphenous Vein DVT at the Femoral Junction with possible PE  - duplex shows DVT consistent with prior  - might need IVC filter  - not candidate for AC at this time due to alveolar hemorrhage     #Immunosuppressed: Autoimmune Hepatitis   - continue with steroid and tacrolimus  - prednisone 60 mg PO qd at home now on solumedrol   - CMV PCR neg this admission     # HTN  - holding amlodipine 10 and lopressor 50   - Monitor BPs    # heterozygous prothrombin gene mutation with hx of PE and DVT on coumadin  - holding coumadin for now due to alveolar hemorrhage   - monitor coags    #0.5 cm of GB polyp  -needs f/u US in 12 months     # Hx of asthma  - C/w montelukast qd    # GI PPx: Protonix 40 mg PO qd  # DVT PPx: sequentials to right leg only  # Activity: bedrest   # Dispo: ICU  # Code Status: FULL 75y female with PMH of asthma, COPD not on home O2, autoimmune hepatitis on prednisone and tacrolimus, heterozygous prothrombin gene mutation with hx of PE and DVT on coumadin presents to the hospital complaining of cough, hemoptysis, SOB and desaturation to 70s. Pt was found to be flu+ likely viral PNA complicated by diffuse alveolar hemorrhage and ARDS . Hospital course complicated by DVT in L saphenous vein with possible DVT and REJI likely secondary to ATN due to vanco.      #ARDS  1-alveolar hemorrhage   2-flu +ve with post viral pneumonia resolved   3-possibel PE: cant start AC due to alveolar hemorrhage    - intubated and sedated  - s/p DDAVP 3 doses   - Solumedrol 40 q24  - Lasix 40   - s/p cefepime and Levaquin (competed 7 days yesterday) - off Vanc >> kidneys toxicity  - completed oseltamivir 30 mg   - C/w Duoneb   - Bronch results - neg culture, neg fungal, cytology positive for inflammatory cells, no organisms  - c/w MIV, monitor for improved mentation to extubate  - Neurology consulted for poor SAT   - EEG   - If no improvement in 48 hours MRI     #Candidemia   - Exchanged CVC  - likely A line is source , found to be foul-smelling and surrounded by pus during removal  - blood cultures grew candida  - daily blood cultures   - c/w caspofungin   - ID following  - fungitell pend  - procalcitonin pending  - trend CBC     #REJI oliguric likely ATN with Vanc toxicity   - acidosis resolved, UO improved   - Cr improving  - no need RRT for now  - Nephro following  - PTH elevated     - calcitriol 0.25  - daily BMP, f/u lytes   - BUN elevated despite downtrending Cr   - Dehydration vs over feeding vs medication induced   - steroids decreased   - IVF   - Nutrition consult for tube feeds    # Chronic? L Saphenous Vein DVT at the Femoral Junction with possible PE  - duplex shows DVT consistent with prior  - might need IVC filter  - not candidate for AC at this time due to alveolar hemorrhage     #Immunosuppressed: Autoimmune Hepatitis   - continue with steroid and tacrolimus  - prednisone 60 mg PO qd at home now on solumedrol   - CMV PCR neg this admission     # HTN  - holding amlodipine 10 and lopressor 50   - Monitor BPs    # heterozygous prothrombin gene mutation with hx of PE and DVT on coumadin  - holding coumadin for now due to alveolar hemorrhage   - monitor coags    #0.5 cm of GB polyp  -needs f/u US in 12 months     # Hx of asthma  - C/w montelukast qd    # GI PPx: Protonix 40 mg PO qd  # DVT PPx: sequentials to right leg only  # Activity: bedrest   # Dispo: ICU  # Code Status: FULL [phone]

## 2023-12-25 NOTE — CARE COORDINATION ASSESSMENT. - CURRENT MENTAL STATUS/COGNITIVE FUNCTIONING
alert/oriented to person/oriented to place/oriented to time/oriented to situation/recent memory is intact show

## 2023-12-25 NOTE — H&P ADULT - PROBLEM SELECTOR PLAN 6
Patient has a DBS  - if patient should require a MRI for any reason, please inquire with radiology  group about MRI compatibility of device  - cont home carbidopa-levodopa

## 2023-12-25 NOTE — H&P ADULT - PROBLEM SELECTOR PLAN 3
- level 3 LN biopsy confirmed s/p partial thyroidectomy and left modified neck  dissection with Dr. Della Colby on 10/17/2023- 2/26 LN metastatic papillary  - pathology revealing mixed type with medullary component - sporadic  - on levothyroxine 125mcg (home med)  - f/u TSH, Free T4, calcitonin  - [facility] Dr. Penny Dailey follow up after discharge - level 3 LN biopsy confirmed s/p partial thyroidectomy and left modified neck  dissection with Dr. Della Colby on 10/17/2023- 2/26 LN metastatic papillary  - pathology revealing mixed type with medullary component - sporadic  - on levothyroxine 125mcg (home med)  - f/u TSH, Free T4, calcitonin  - [facility] Dr. Penny Dailey follow up after discharge

## 2023-12-25 NOTE — PATIENT PROFILE ADULT - FUNCTIONAL ASSESSMENT - BASIC MOBILITY 6.
2-calculated by average/Not able to assess (calculate score using [facility] averaging method)  2-calculated by average/Not able to assess (calculate score using [facility] averaging method)

## 2023-12-25 NOTE — H&P ADULT - ASSESSMENT
68M hx urothelial cancer (on Enfortumab Vedotin and Pembrolizumab last treatment 12/13, s/p L ureteral stent s/p exchange, mets to lung), R kidney stones (s/p R ureteroscopy w/ stone removal 8/2023), BPH, parkinson disease (s/p DBS), hypothyroidism (thyroid cancer s/p partial thyroidectomy), presented to ED for weakness and fatigue.

## 2023-12-26 LAB
A1C WITH ESTIMATED AVERAGE GLUCOSE RESULT: 6.4 % — HIGH (ref 4–5.6)
A1C WITH ESTIMATED AVERAGE GLUCOSE RESULT: 6.4 % — HIGH (ref 4–5.6)
ALBUMIN SERPL ELPH-MCNC: 2 G/DL — LOW (ref 3.3–5)
ALBUMIN SERPL ELPH-MCNC: 2 G/DL — LOW (ref 3.3–5)
ALP SERPL-CCNC: 90 U/L — SIGNIFICANT CHANGE UP (ref 40–120)
ALP SERPL-CCNC: 90 U/L — SIGNIFICANT CHANGE UP (ref 40–120)
ALT FLD-CCNC: 24 U/L — SIGNIFICANT CHANGE UP (ref 12–78)
ALT FLD-CCNC: 24 U/L — SIGNIFICANT CHANGE UP (ref 12–78)
ANION GAP SERPL CALC-SCNC: 8 MMOL/L — SIGNIFICANT CHANGE UP (ref 5–17)
ANION GAP SERPL CALC-SCNC: 8 MMOL/L — SIGNIFICANT CHANGE UP (ref 5–17)
AST SERPL-CCNC: 36 U/L — SIGNIFICANT CHANGE UP (ref 15–37)
AST SERPL-CCNC: 36 U/L — SIGNIFICANT CHANGE UP (ref 15–37)
BASOPHILS # BLD AUTO: 0.04 K/UL — SIGNIFICANT CHANGE UP (ref 0–0.2)
BASOPHILS # BLD AUTO: 0.04 K/UL — SIGNIFICANT CHANGE UP (ref 0–0.2)
BASOPHILS NFR BLD AUTO: 0.7 % — SIGNIFICANT CHANGE UP (ref 0–2)
BASOPHILS NFR BLD AUTO: 0.7 % — SIGNIFICANT CHANGE UP (ref 0–2)
BILIRUB SERPL-MCNC: 0.8 MG/DL — SIGNIFICANT CHANGE UP (ref 0.2–1.2)
BILIRUB SERPL-MCNC: 0.8 MG/DL — SIGNIFICANT CHANGE UP (ref 0.2–1.2)
BUN SERPL-MCNC: 15 MG/DL — SIGNIFICANT CHANGE UP (ref 7–23)
BUN SERPL-MCNC: 15 MG/DL — SIGNIFICANT CHANGE UP (ref 7–23)
CALCIT SERPL-MCNC: 2 PG/ML — SIGNIFICANT CHANGE UP
CALCIT SERPL-MCNC: 2 PG/ML — SIGNIFICANT CHANGE UP
CALCIUM SERPL-MCNC: 7.9 MG/DL — LOW (ref 8.5–10.1)
CALCIUM SERPL-MCNC: 7.9 MG/DL — LOW (ref 8.5–10.1)
CHLORIDE SERPL-SCNC: 110 MMOL/L — HIGH (ref 96–108)
CHLORIDE SERPL-SCNC: 110 MMOL/L — HIGH (ref 96–108)
CHOLEST SERPL-MCNC: 81 MG/DL — SIGNIFICANT CHANGE UP
CHOLEST SERPL-MCNC: 81 MG/DL — SIGNIFICANT CHANGE UP
CO2 SERPL-SCNC: 22 MMOL/L — SIGNIFICANT CHANGE UP (ref 22–31)
CO2 SERPL-SCNC: 22 MMOL/L — SIGNIFICANT CHANGE UP (ref 22–31)
CREAT SERPL-MCNC: 0.95 MG/DL — SIGNIFICANT CHANGE UP (ref 0.5–1.3)
CREAT SERPL-MCNC: 0.95 MG/DL — SIGNIFICANT CHANGE UP (ref 0.5–1.3)
EGFR: 88 ML/MIN/1.73M2 — SIGNIFICANT CHANGE UP
EGFR: 88 ML/MIN/1.73M2 — SIGNIFICANT CHANGE UP
EOSINOPHIL # BLD AUTO: 0.01 K/UL — SIGNIFICANT CHANGE UP (ref 0–0.5)
EOSINOPHIL # BLD AUTO: 0.01 K/UL — SIGNIFICANT CHANGE UP (ref 0–0.5)
EOSINOPHIL NFR BLD AUTO: 0.2 % — SIGNIFICANT CHANGE UP (ref 0–6)
EOSINOPHIL NFR BLD AUTO: 0.2 % — SIGNIFICANT CHANGE UP (ref 0–6)
ESTIMATED AVERAGE GLUCOSE: 137 MG/DL — HIGH (ref 68–114)
ESTIMATED AVERAGE GLUCOSE: 137 MG/DL — HIGH (ref 68–114)
FERRITIN SERPL-MCNC: 1440 NG/ML — HIGH (ref 30–400)
FERRITIN SERPL-MCNC: 1440 NG/ML — HIGH (ref 30–400)
GLUCOSE SERPL-MCNC: 85 MG/DL — SIGNIFICANT CHANGE UP (ref 70–99)
GLUCOSE SERPL-MCNC: 85 MG/DL — SIGNIFICANT CHANGE UP (ref 70–99)
HCT VFR BLD CALC: 27.7 % — LOW (ref 39–50)
HCT VFR BLD CALC: 27.7 % — LOW (ref 39–50)
HCV AB S/CO SERPL IA: 0.15 S/CO — SIGNIFICANT CHANGE UP (ref 0–0.99)
HCV AB S/CO SERPL IA: 0.15 S/CO — SIGNIFICANT CHANGE UP (ref 0–0.99)
HCV AB SERPL-IMP: SIGNIFICANT CHANGE UP
HCV AB SERPL-IMP: SIGNIFICANT CHANGE UP
HDLC SERPL-MCNC: 27 MG/DL — LOW
HDLC SERPL-MCNC: 27 MG/DL — LOW
HGB BLD-MCNC: 8.6 G/DL — LOW (ref 13–17)
HGB BLD-MCNC: 8.6 G/DL — LOW (ref 13–17)
IMM GRANULOCYTES NFR BLD AUTO: 0.9 % — SIGNIFICANT CHANGE UP (ref 0–0.9)
IMM GRANULOCYTES NFR BLD AUTO: 0.9 % — SIGNIFICANT CHANGE UP (ref 0–0.9)
INR BLD: 1.27 RATIO — HIGH (ref 0.85–1.18)
INR BLD: 1.27 RATIO — HIGH (ref 0.85–1.18)
IRON SATN MFR SERPL: 28 % — SIGNIFICANT CHANGE UP (ref 16–55)
IRON SATN MFR SERPL: 28 % — SIGNIFICANT CHANGE UP (ref 16–55)
IRON SATN MFR SERPL: 43 UG/DL — LOW (ref 45–165)
IRON SATN MFR SERPL: 43 UG/DL — LOW (ref 45–165)
LIPID PNL WITH DIRECT LDL SERPL: 39 MG/DL — SIGNIFICANT CHANGE UP
LIPID PNL WITH DIRECT LDL SERPL: 39 MG/DL — SIGNIFICANT CHANGE UP
LYMPHOCYTES # BLD AUTO: 0.71 K/UL — LOW (ref 1–3.3)
LYMPHOCYTES # BLD AUTO: 0.71 K/UL — LOW (ref 1–3.3)
LYMPHOCYTES # BLD AUTO: 12.3 % — LOW (ref 13–44)
LYMPHOCYTES # BLD AUTO: 12.3 % — LOW (ref 13–44)
MCHC RBC-ENTMCNC: 24.1 PG — LOW (ref 27–34)
MCHC RBC-ENTMCNC: 24.1 PG — LOW (ref 27–34)
MCHC RBC-ENTMCNC: 31 GM/DL — LOW (ref 32–36)
MCHC RBC-ENTMCNC: 31 GM/DL — LOW (ref 32–36)
MCV RBC AUTO: 77.6 FL — LOW (ref 80–100)
MCV RBC AUTO: 77.6 FL — LOW (ref 80–100)
MONOCYTES # BLD AUTO: 0.88 K/UL — SIGNIFICANT CHANGE UP (ref 0–0.9)
MONOCYTES # BLD AUTO: 0.88 K/UL — SIGNIFICANT CHANGE UP (ref 0–0.9)
MONOCYTES NFR BLD AUTO: 15.3 % — HIGH (ref 2–14)
MONOCYTES NFR BLD AUTO: 15.3 % — HIGH (ref 2–14)
NEUTROPHILS # BLD AUTO: 4.06 K/UL — SIGNIFICANT CHANGE UP (ref 1.8–7.4)
NEUTROPHILS # BLD AUTO: 4.06 K/UL — SIGNIFICANT CHANGE UP (ref 1.8–7.4)
NEUTROPHILS NFR BLD AUTO: 70.6 % — SIGNIFICANT CHANGE UP (ref 43–77)
NEUTROPHILS NFR BLD AUTO: 70.6 % — SIGNIFICANT CHANGE UP (ref 43–77)
NON HDL CHOLESTEROL: 54 MG/DL — SIGNIFICANT CHANGE UP
NON HDL CHOLESTEROL: 54 MG/DL — SIGNIFICANT CHANGE UP
NRBC # BLD: 0 /100 WBCS — SIGNIFICANT CHANGE UP (ref 0–0)
NRBC # BLD: 0 /100 WBCS — SIGNIFICANT CHANGE UP (ref 0–0)
PLATELET # BLD AUTO: 310 K/UL — SIGNIFICANT CHANGE UP (ref 150–400)
PLATELET # BLD AUTO: 310 K/UL — SIGNIFICANT CHANGE UP (ref 150–400)
POTASSIUM SERPL-MCNC: 3.2 MMOL/L — LOW (ref 3.5–5.3)
POTASSIUM SERPL-MCNC: 3.2 MMOL/L — LOW (ref 3.5–5.3)
POTASSIUM SERPL-SCNC: 3.2 MMOL/L — LOW (ref 3.5–5.3)
POTASSIUM SERPL-SCNC: 3.2 MMOL/L — LOW (ref 3.5–5.3)
PROT SERPL-MCNC: 5.9 G/DL — LOW (ref 6–8.3)
PROT SERPL-MCNC: 5.9 G/DL — LOW (ref 6–8.3)
PROTHROM AB SERPL-ACNC: 14.7 SEC — HIGH (ref 9.5–13)
PROTHROM AB SERPL-ACNC: 14.7 SEC — HIGH (ref 9.5–13)
RBC # BLD: 3.57 M/UL — LOW (ref 4.2–5.8)
RBC # BLD: 3.57 M/UL — LOW (ref 4.2–5.8)
RBC # FLD: 21.7 % — HIGH (ref 10.3–14.5)
RBC # FLD: 21.7 % — HIGH (ref 10.3–14.5)
SODIUM SERPL-SCNC: 140 MMOL/L — SIGNIFICANT CHANGE UP (ref 135–145)
SODIUM SERPL-SCNC: 140 MMOL/L — SIGNIFICANT CHANGE UP (ref 135–145)
T4 AB SER-ACNC: 2.8 UG/DL — LOW (ref 4.6–12)
T4 AB SER-ACNC: 2.8 UG/DL — LOW (ref 4.6–12)
TIBC SERPL-MCNC: 157 UG/DL — LOW (ref 220–430)
TIBC SERPL-MCNC: 157 UG/DL — LOW (ref 220–430)
TRIGL SERPL-MCNC: 69 MG/DL — SIGNIFICANT CHANGE UP
TRIGL SERPL-MCNC: 69 MG/DL — SIGNIFICANT CHANGE UP
TSH SERPL-MCNC: 59.3 UIU/ML — HIGH (ref 0.36–3.74)
TSH SERPL-MCNC: 59.3 UIU/ML — HIGH (ref 0.36–3.74)
UIBC SERPL-MCNC: 114 UG/DL — SIGNIFICANT CHANGE UP (ref 110–370)
UIBC SERPL-MCNC: 114 UG/DL — SIGNIFICANT CHANGE UP (ref 110–370)
WBC # BLD: 5.75 K/UL — SIGNIFICANT CHANGE UP (ref 3.8–10.5)
WBC # BLD: 5.75 K/UL — SIGNIFICANT CHANGE UP (ref 3.8–10.5)
WBC # FLD AUTO: 5.75 K/UL — SIGNIFICANT CHANGE UP (ref 3.8–10.5)
WBC # FLD AUTO: 5.75 K/UL — SIGNIFICANT CHANGE UP (ref 3.8–10.5)

## 2023-12-26 PROCEDURE — 99232 SBSQ HOSP IP/OBS MODERATE 35: CPT

## 2023-12-26 RX ORDER — HEPARIN SODIUM 5000 [USP'U]/ML
5000 INJECTION INTRAVENOUS; SUBCUTANEOUS EVERY 8 HOURS
Refills: 0 | Status: DISCONTINUED | OUTPATIENT
Start: 2023-12-26 | End: 2023-12-28

## 2023-12-26 RX ADMIN — ATORVASTATIN CALCIUM 20 MILLIGRAM(S): 80 TABLET, FILM COATED ORAL at 20:04

## 2023-12-26 RX ADMIN — CARBIDOPA AND LEVODOPA 0.5 TABLET(S): 25; 100 TABLET ORAL at 11:57

## 2023-12-26 RX ADMIN — HYDROMORPHONE HYDROCHLORIDE 0.5 MILLIGRAM(S): 2 INJECTION INTRAMUSCULAR; INTRAVENOUS; SUBCUTANEOUS at 17:35

## 2023-12-26 RX ADMIN — HYDROMORPHONE HYDROCHLORIDE 0.5 MILLIGRAM(S): 2 INJECTION INTRAMUSCULAR; INTRAVENOUS; SUBCUTANEOUS at 23:05

## 2023-12-26 RX ADMIN — HYDROMORPHONE HYDROCHLORIDE 0.5 MILLIGRAM(S): 2 INJECTION INTRAMUSCULAR; INTRAVENOUS; SUBCUTANEOUS at 17:50

## 2023-12-26 RX ADMIN — MAGNESIUM OXIDE 400 MG ORAL TABLET 400 MILLIGRAM(S): 241.3 TABLET ORAL at 17:35

## 2023-12-26 RX ADMIN — Medication 650 MILLIGRAM(S): at 20:34

## 2023-12-26 RX ADMIN — CARBIDOPA AND LEVODOPA 0.5 TABLET(S): 25; 100 TABLET ORAL at 23:04

## 2023-12-26 RX ADMIN — HEPARIN SODIUM 5000 UNIT(S): 5000 INJECTION INTRAVENOUS; SUBCUTANEOUS at 21:19

## 2023-12-26 RX ADMIN — SODIUM CHLORIDE 75 MILLILITER(S): 9 INJECTION INTRAMUSCULAR; INTRAVENOUS; SUBCUTANEOUS at 16:04

## 2023-12-26 RX ADMIN — Medication 81 MILLIGRAM(S): at 11:57

## 2023-12-26 RX ADMIN — CARBIDOPA AND LEVODOPA 0.5 TABLET(S): 25; 100 TABLET ORAL at 17:34

## 2023-12-26 RX ADMIN — TAMSULOSIN HYDROCHLORIDE 0.8 MILLIGRAM(S): 0.4 CAPSULE ORAL at 20:04

## 2023-12-26 RX ADMIN — Medication 650 MILLIGRAM(S): at 20:04

## 2023-12-26 RX ADMIN — MAGNESIUM OXIDE 400 MG ORAL TABLET 400 MILLIGRAM(S): 241.3 TABLET ORAL at 11:57

## 2023-12-26 RX ADMIN — CARBIDOPA AND LEVODOPA 0.5 TABLET(S): 25; 100 TABLET ORAL at 08:43

## 2023-12-26 RX ADMIN — Medication 125 MICROGRAM(S): at 05:35

## 2023-12-26 RX ADMIN — MAGNESIUM OXIDE 400 MG ORAL TABLET 400 MILLIGRAM(S): 241.3 TABLET ORAL at 08:09

## 2023-12-26 RX ADMIN — HYDROMORPHONE HYDROCHLORIDE 0.5 MILLIGRAM(S): 2 INJECTION INTRAMUSCULAR; INTRAVENOUS; SUBCUTANEOUS at 05:49

## 2023-12-26 RX ADMIN — HYDROMORPHONE HYDROCHLORIDE 0.5 MILLIGRAM(S): 2 INJECTION INTRAMUSCULAR; INTRAVENOUS; SUBCUTANEOUS at 05:34

## 2023-12-26 NOTE — CONSULT NOTE ADULT - SUBJECTIVE AND OBJECTIVE BOX
All records reviewed.  pt seen in room w wife    HPI:  68man w met urothelial ca lung mets(under care Dr sagar Lazcano [facility], treatment done [place]. Post CDDP/Gemzar, then Keytruda, now Keytruda/Padcev post C#2 12/13)  s/p L ureteral stent s/p exchange, mets to lung), R kidney stones (s/p R ureteroscopy w/ stone removal 8/2023), BPH, Parkinson disease (s/p DBS), hypothyroidism (thyroid cancer s/p partial thyroidectomy),  Pt adm [facility] for weakness, diarrhea, azotemia(reports Cr 4), once home still w sig weakness/legs giving out when try to ambulate, diarrhea, and brought to [place] ER  Has been on po Lomotil and Immondium. No nausea but poor po. no rash or SOB or fever  ED Course:   Vitals: BP: 115/58, HR: 75, Temp: 97.3, RR: 17, SpO2: 94% on 3 L NC  Labs: HGB: 8.4 Creatine: 1.60 EGFR: 47  UA: Urine RBC: 25, Urine Blood: Large  CXR: as per personal read, official read pending   EKG: Normal sinus rhythm  Received in the ED:  Dilaudid, Sinemet, IV 1L Bolus   (25 Dec 2023 12:04)    today, 12/26, Hgb 8.6, BN/Cr12/0.95, still sig weakness,but no diarrhea/BM today      PAST MEDICAL & SURGICAL HISTORY:  Hypernephroma      Medullary thyroid carcinoma      No significant past surgical history          Review of System:  see above    MEDICATIONS  (STANDING):  aspirin  chewable 81 milliGRAM(s) Oral daily  atorvastatin 20 milliGRAM(s) Oral at bedtime  carbidopa/levodopa  25/250 0.5 Tablet(s) Oral four times a day  levothyroxine 125 MICROGram(s) Oral daily  magnesium oxide 400 milliGRAM(s) Oral three times a day with meals  metoprolol succinate ER 50 milliGRAM(s) Oral daily  sodium chloride 0.9%. 1000 milliLiter(s) (125 mL/Hr) IV Continuous <Continuous>  tamsulosin 0.8 milliGRAM(s) Oral at bedtime    MEDICATIONS  (PRN):  acetaminophen     Tablet .. 650 milliGRAM(s) Oral every 6 hours PRN Temp greater or equal to 38C (100.4F), Mild Pain (1 - 3)  diphenoxylate/atropine 1 Tablet(s) Oral three times a day PRN for diarrhea  HYDROmorphone  Injectable 0.5 milliGRAM(s) IV Push four times a day PRN Severe Pain (7 - 10)  HYDROmorphone  Injectable 0.2 milliGRAM(s) IV Push four times a day PRN Moderate Pain (4 - 6)  melatonin 3 milliGRAM(s) Oral at bedtime PRN Insomnia  ondansetron Injectable 4 milliGRAM(s) IV Push every 8 hours PRN Nausea and/or Vomiting      Allergies    No Known Allergies    Intolerances        SOCIAL HISTORY:  no Etoh or tobacco    FAMILY HISTORY:  denies    Vital Signs Last 24 Hrs  T(C): 36.8 (26 Dec 2023 05:10), Max: 36.8 (26 Dec 2023 05:10)  T(F): 98.2 (26 Dec 2023 05:10), Max: 98.2 (26 Dec 2023 05:10)  HR: 80 (26 Dec 2023 05:10) (74 - 81)  BP: 104/53 (26 Dec 2023 05:10) (104/53 - 115/55)  BP(mean): --  RR: 18 (26 Dec 2023 05:10) (16 - 18)  SpO2: 95% (26 Dec 2023 05:10) (93% - 95%)    Parameters below as of 26 Dec 2023 05:10  Patient On (Oxygen Delivery Method): room air  O2 Flow (L/min): 3      PHYSICAL EXAM:      General:overweight weak appearing man in bed, in no acute distress  Eyes:sclera anicteric, pupils equal and EOMI  ENMT:buccal mucosa moist, nose midline  Neck:supple, trachea midline  Lungs:clear, no wheeze/rhonchi  Cardiovascular:regular rate and rhythm, S1 S2  Abdomen:soft, nontender, no organomegaly present, bowel sounds normal  Neurological:alert and oriented x3, Cranial Nerves II-XII grossly intact  Skin:no increased ecchymosis/petechiae/purpura  Lymph Nodes:no palpable cervical/supraclavicular lymph nodes enlargements  Extremities:no cyanosis/clubbing/edema        LABS:                        8.6    5.75  )-----------( 310      ( 26 Dec 2023 07:40 )             27.7     12-26 @ 07:40  WBC5.75  RBC3.57 Hgb8.6 Hct27.7  MCV77.6  [id number]  Auto Cmcnwm37.6 Band-- Auto Lymph12.3 Atypical Lymph-- Reactive Lymph-- Auto Mono15.3 Auto Eos0.2 Auto Baso0.7        12-24 @ 20:20  WBC7.82  RBC3.49 Hgb8.4 Hct26.4  MCV75.6  [id number]  Auto Neutro-- Band-- Auto Lymph-- Atypical Lymph-- Reactive Lymph-- Auto Mono-- Auto Eos-- Auto Baso--          12-26    140  |  110<H>  |  15  ----------------------------<  85  3.2<L>   |  22  |  0.95    Ca    7.9<L>      26 Dec 2023 07:40  Mg     2.0     12-24    TPro  5.9<L>  /  Alb  2.0<L>  /  TBili  0.8  /  DBili  x   /  AST  36  /  ALT  24  /  AlkPhos  90  12-26 12-26 @ 07:40  PT14.7 INR1.27  PTT--  12-24 @ 20:20  PT14.7 INR1.27  PTT24.4    Urinalysis Basic - ( 26 Dec 2023 07:40 )    Color: x / Appearance: x / SG: x / pH: x  Gluc: 85 mg/dL / Ketone: x  / Bili: x / Urobili: x   Blood: x / Protein: x / Nitrite: x   Leuk Esterase: x / RBC: x / WBC x   Sq Epi: x / Non Sq Epi: x / Bacteria: x        PERIPHERAL BLOOD SMEAR REVIEW:      RADIOLOGY & ADDITIONAL STUDIES: All records reviewed.  pt seen in room w wife    HPI:  68man w met urothelial ca lung mets(under care Dr sagar Lazcano [facility], treatment done [place]. Post CDDP/Gemzar, then Keytruda, now Keytruda/Padcev post C#2 12/13)  s/p L ureteral stent s/p exchange, mets to lung), R kidney stones (s/p R ureteroscopy w/ stone removal 8/2023), BPH, Parkinson disease (s/p DBS), hypothyroidism (thyroid cancer s/p partial thyroidectomy),  Pt adm [facility] for weakness, diarrhea, azotemia(reports Cr 4), once home still w sig weakness/legs giving out when try to ambulate, diarrhea, and brought to [place] ER  Has been on po Lomotil and Immondium. No nausea but poor po. no rash or SOB or fever  ED Course:   Vitals: BP: 115/58, HR: 75, Temp: 97.3, RR: 17, SpO2: 94% on 3 L NC  Labs: HGB: 8.4 Creatine: 1.60 EGFR: 47  UA: Urine RBC: 25, Urine Blood: Large  CXR: as per personal read, official read pending   EKG: Normal sinus rhythm  Received in the ED:  Dilaudid, Sinemet, IV 1L Bolus   (25 Dec 2023 12:04)    today, 12/26, Hgb 8.6, BN/Cr12/0.95, still sig weakness,but no diarrhea/BM today      PAST MEDICAL & SURGICAL HISTORY:  Hypernephroma      Medullary thyroid carcinoma      No significant past surgical history          Review of System:  see above    MEDICATIONS  (STANDING):  aspirin  chewable 81 milliGRAM(s) Oral daily  atorvastatin 20 milliGRAM(s) Oral at bedtime  carbidopa/levodopa  25/250 0.5 Tablet(s) Oral four times a day  levothyroxine 125 MICROGram(s) Oral daily  magnesium oxide 400 milliGRAM(s) Oral three times a day with meals  metoprolol succinate ER 50 milliGRAM(s) Oral daily  sodium chloride 0.9%. 1000 milliLiter(s) (125 mL/Hr) IV Continuous <Continuous>  tamsulosin 0.8 milliGRAM(s) Oral at bedtime    MEDICATIONS  (PRN):  acetaminophen     Tablet .. 650 milliGRAM(s) Oral every 6 hours PRN Temp greater or equal to 38C (100.4F), Mild Pain (1 - 3)  diphenoxylate/atropine 1 Tablet(s) Oral three times a day PRN for diarrhea  HYDROmorphone  Injectable 0.5 milliGRAM(s) IV Push four times a day PRN Severe Pain (7 - 10)  HYDROmorphone  Injectable 0.2 milliGRAM(s) IV Push four times a day PRN Moderate Pain (4 - 6)  melatonin 3 milliGRAM(s) Oral at bedtime PRN Insomnia  ondansetron Injectable 4 milliGRAM(s) IV Push every 8 hours PRN Nausea and/or Vomiting      Allergies    No Known Allergies    Intolerances        SOCIAL HISTORY:  no Etoh or tobacco    FAMILY HISTORY:  denies    Vital Signs Last 24 Hrs  T(C): 36.8 (26 Dec 2023 05:10), Max: 36.8 (26 Dec 2023 05:10)  T(F): 98.2 (26 Dec 2023 05:10), Max: 98.2 (26 Dec 2023 05:10)  HR: 80 (26 Dec 2023 05:10) (74 - 81)  BP: 104/53 (26 Dec 2023 05:10) (104/53 - 115/55)  BP(mean): --  RR: 18 (26 Dec 2023 05:10) (16 - 18)  SpO2: 95% (26 Dec 2023 05:10) (93% - 95%)    Parameters below as of 26 Dec 2023 05:10  Patient On (Oxygen Delivery Method): room air  O2 Flow (L/min): 3      PHYSICAL EXAM:      General:overweight weak appearing man in bed, in no acute distress  Eyes:sclera anicteric, pupils equal and EOMI  ENMT:buccal mucosa moist, nose midline  Neck:supple, trachea midline  Lungs:clear, no wheeze/rhonchi  Cardiovascular:regular rate and rhythm, S1 S2  Abdomen:soft, nontender, no organomegaly present, bowel sounds normal  Neurological:alert and oriented x3, Cranial Nerves II-XII grossly intact  Skin:no increased ecchymosis/petechiae/purpura  Lymph Nodes:no palpable cervical/supraclavicular lymph nodes enlargements  Extremities:no cyanosis/clubbing/edema        LABS:                        8.6    5.75  )-----------( 310      ( 26 Dec 2023 07:40 )             27.7     12-26 @ 07:40  WBC5.75  RBC3.57 Hgb8.6 Hct27.7  MCV77.6  [id number]  Auto Mqgvuo89.6 Band-- Auto Lymph12.3 Atypical Lymph-- Reactive Lymph-- Auto Mono15.3 Auto Eos0.2 Auto Baso0.7        12-24 @ 20:20  WBC7.82  RBC3.49 Hgb8.4 Hct26.4  MCV75.6  Brts290  Auto Neutro-- Band-- Auto Lymph-- Atypical Lymph-- Reactive Lymph-- Auto Mono-- Auto Eos-- Auto Baso--          12-26    140  |  110<H>  |  15  ----------------------------<  85  3.2<L>   |  22  |  0.95    Ca    7.9<L>      26 Dec 2023 07:40  Mg     2.0     12-24    TPro  5.9<L>  /  Alb  2.0<L>  /  TBili  0.8  /  DBili  x   /  AST  36  /  ALT  24  /  AlkPhos  90  12-26 12-26 @ 07:40  PT14.7 INR1.27  PTT--  12-24 @ 20:20  PT14.7 INR1.27  PTT24.4    Urinalysis Basic - ( 26 Dec 2023 07:40 )    Color: x / Appearance: x / SG: x / pH: x  Gluc: 85 mg/dL / Ketone: x  / Bili: x / Urobili: x   Blood: x / Protein: x / Nitrite: x   Leuk Esterase: x / RBC: x / WBC x   Sq Epi: x / Non Sq Epi: x / Bacteria: x        PERIPHERAL BLOOD SMEAR REVIEW:      RADIOLOGY & ADDITIONAL STUDIES:

## 2023-12-26 NOTE — OCCUPATIONAL THERAPY INITIAL EVALUATION ADULT - DIAGNOSIS, OT EVAL
Pt requires assist for ADLs and functional mobility due to decreased activity tolerance, generalized weakness, decreased flexibility, and decreased balance.

## 2023-12-26 NOTE — OCCUPATIONAL THERAPY INITIAL EVALUATION ADULT - GENERAL OBSERVATIONS, REHAB EVAL
Pt received seated in bedside chair (+) [place] catheter, O2 3LPM via NC, chest port, NAD. Pt agrees to participate with OT for eval and jeevan fairly.

## 2023-12-26 NOTE — OCCUPATIONAL THERAPY INITIAL EVALUATION ADULT - ADL RETRAINING, OT EVAL
Pt will dress upper body with Wei, seated, within 2-5 sessions. Pt will dress lower body with modA, seated, AE as needed within 2-5 sessions

## 2023-12-26 NOTE — PROGRESS NOTE ADULT - PROBLEM SELECTOR PLAN 4
Baseline SCr 1.2  L renal mass a/w hydronephrosis, R renal mass non-obstructive  - [place] [facility] [place] w/o L hydro, mass increased in size from 10x8 on CT  9/6/23 to 21.5cm  - Maintenance IVF in ED  - Urology consulted at [facility] 12/22, no acute urologic intervention - hydro 2/2 tumor burden in  non-functioning kidney, believed not to be contributing to MANISHA  - trend creatinine  - nephrology  - cont home tamsulosin. Baseline SCr 1.2  L renal mass a/w hydronephrosis, R renal mass non-obstructive  - renal sono at [facility] 12/22 w/o L hydro, mass increased in size from 10x8 on CT  9/6/23 to 21.5cm  - Maintenance IVF in ED  - Urology consulted at [facility] 12/22, no acute urologic intervention - hydro 2/2 tumor burden in  non-functioning kidney, believed not to be contributing to MANISHA  - trend creatinine  - nephrology  - cont home tamsulosin.

## 2023-12-26 NOTE — CONSULT NOTE ADULT - SUBJECTIVE AND OBJECTIVE BOX
Patient is a 68y old  Male who presents with a chief complaint of Weakness/Fatigue (25 Dec 2023 12:04)    HPI:  68M hx urothelial cancer (on Enfortumab Vedotin and Pembrolizumab last treatment 12/13, s/p L ureteral stent s/p exchange, mets to lung), R kidney stones (s/p R ureteroscopy w/ stone removal 8/2023), BPH, parkinson disease (s/p DBS), hypothyroidism (thyroid cancer s/p partial thyroidectomy), presented to ED for weakness and fatigue. Patient states that yesterday he was d/c from [facility] with home PT. Patient attempted to participate in home PT was uncomfortable with their walker. Patient states that later he has two episodes of his legs giving out from underneath him when trying to use their walker. He states he didn't fall, but slowly sat down on the ground and his family wasn't able to help him up so they called the ambulance. Patient's family would like evaluation for OG placement. He states he has also had decreased PO intake and has trouble with hard foods. Patient currently on 3 L NC, but states he just got it to make him more comfortable although was doing fine without it before.     Denies fever, chills, chest pain, palpitations, SOB, cough, abdominal pain, nausea, vomiting, constipation, urinary frequency, urgency, or dysuria, headaches, changes in vision, dizziness, numbness, tingling.      ED Course:   Vitals: BP: 115/58, HR: 75, Temp: 97.3, RR: 17, SpO2: 94% on 3 L NC  Labs: HGB: 8.4 Creatine: 1.60 EGFR: 47  UA: Urine RBC: 25, Urine Blood: Large  CXR: as per personal read, official read pending   EKG: Normal sinus rhythm  Received in the ED:  Dilaudid, Sinemet, IV 1L Bolus   (25 Dec 2023 12:04)      PAST MEDICAL HISTORY:  Hypernephroma    Medullary thyroid carcinoma        PAST SURGICAL HISTORY:  No significant past surgical history        FAMILY HISTORY:      SOCIAL HISTORY:    Allergies    No Known Allergies    Intolerances      Home Medications:  aspirin 81 mg oral tablet: 1 tab(s) orally once a day (25 Dec 2023 13:04)  atorvastatin 20 mg oral tablet: 1 tab(s) orally once a day (25 Dec 2023 13:08)  carbidopa-levodopa 25 mg-250 mg oral tablet: 0.5 tab(s) orally 4 times a day (25 Dec 2023 13:08)  diphenoxylate-atropine 2.5 mg-0.025 mg oral tablet: 1 tab(s) orally 3 times a day as needed for  diarrhea (25 Dec 2023 13:09)  levothyroxine 125 mcg (0.125 mg) oral tablet: 1 tab(s) orally once a day (25 Dec 2023 13:09)  magnesium oxide 400 mg oral tablet: 1 tab(s) orally 3 times a day (25 Dec 2023 13:09)  metoprolol succinate 50 mg oral tablet, extended release: 1 tab(s) orally once a day (25 Dec 2023 13:03)  tamsulosin 0.4 mg oral capsule: 2 cap(s) orally once a day (at bedtime) (25 Dec 2023 13:09)    MEDICATIONS  (STANDING):  aspirin  chewable 81 milliGRAM(s) Oral daily  atorvastatin 20 milliGRAM(s) Oral at bedtime  carbidopa/levodopa  25/250 0.5 Tablet(s) Oral four times a day  levothyroxine 125 MICROGram(s) Oral daily  magnesium oxide 400 milliGRAM(s) Oral three times a day with meals  metoprolol succinate ER 50 milliGRAM(s) Oral daily  sodium chloride 0.9%. 1000 milliLiter(s) (125 mL/Hr) IV Continuous <Continuous>  tamsulosin 0.8 milliGRAM(s) Oral at bedtime    MEDICATIONS  (PRN):  acetaminophen     Tablet .. 650 milliGRAM(s) Oral every 6 hours PRN Temp greater or equal to 38C (100.4F), Mild Pain (1 - 3)  diphenoxylate/atropine 1 Tablet(s) Oral three times a day PRN for diarrhea  HYDROmorphone  Injectable 0.5 milliGRAM(s) IV Push four times a day PRN Severe Pain (7 - 10)  HYDROmorphone  Injectable 0.2 milliGRAM(s) IV Push four times a day PRN Moderate Pain (4 - 6)  melatonin 3 milliGRAM(s) Oral at bedtime PRN Insomnia  ondansetron Injectable 4 milliGRAM(s) IV Push every 8 hours PRN Nausea and/or Vomiting      REVIEW OF SYSTEMS:  General:   Respiratory: No cough, SOB  Cardiovascular: No CP or Palpitations	  Gastrointestinal: No nausea, Vomiting. No diarrhea  Genitourinary: No urinary complaints	  Musculoskeletal: No leg swelling, No new rash or lesions	  Neurological: 	  all other systems negative    T(F): 98.2 (12-26-23 @ 05:10), Max: 98.2 (12-26-23 @ 05:10)  HR: 80 (12-26-23 @ 05:10) (74 - 81)  BP: 104/53 (12-26-23 @ 05:10) (104/53 - 115/55)  RR: 18 (12-26-23 @ 05:10) (16 - 18)  SpO2: 95% (12-26-23 @ 05:10) (93% - 95%)  Wt(kg): --    PHYSICAL EXAM:  General: NAD  Respiratory: b/l air entry  Cardiovascular: S1 S2  Gastrointestinal: soft  Extremities: edema        12-26    140  |  110<H>  |  15  ----------------------------<  85  3.2<L>   |  22  |  0.95    Ca    7.9<L>      26 Dec 2023 07:40  Mg     2.0     12-24    TPro  x   /  Alb  2.0<L>  /  TBili  x   /  DBili  x   /  AST  36  /  ALT  24  /  AlkPhos  x   12-26                          8.6    5.75  )-----------( 310      ( 26 Dec 2023 07:40 )             27.7       Potassium: 3.2 mmol/L (12-26 @ 07:40)  Blood Urea Nitrogen: 15 mg/dL (12-26 @ 07:40)  Calcium: 7.9 mg/dL (12-26 @ 07:40)  Hemoglobin: 8.6 g/dL (12-26 @ 07:40)      Creatinine, Serum: 0.95 (12-26 @ 07:40)  Creatinine, Serum: 1.60 (12-24 @ 20:20)      Urinalysis Basic - ( 26 Dec 2023 07:40 )    Color: x / Appearance: x / SG: x / pH: x  Gluc: 85 mg/dL / Ketone: x  / Bili: x / Urobili: x   Blood: x / Protein: x / Nitrite: x   Leuk Esterase: x / RBC: x / WBC x   Sq Epi: x / Non Sq Epi: x / Bacteria: x      LIVER FUNCTIONS - ( 26 Dec 2023 07:40 )  Alb: 2.0 g/dL / Pro: x     / ALK PHOS: x     / ALT: 24 U/L / AST: 36 U/L / GGT: x                       I&O's Detail    25 Dec 2023 07:01  -  26 Dec 2023 07:00  --------------------------------------------------------  IN:    sodium chloride 0.9%: 1375 mL  Total IN: 1375 mL    OUT:    Voided (mL): 1200 mL  Total OUT: 1200 mL    Total NET: 175 mL               Patient is a 68y old  Male who presents with a chief complaint of Weakness/Fatigue (25 Dec 2023 12:04)    HPI:  68M hx urothelial cancer (on Enfortumab Vedotin and Pembrolizumab last treatment 12/13, s/p L ureteral stent s/p exchange, mets to lung), R kidney stones (s/p R ureteroscopy w/ stone removal 8/2023), BPH, parkinson disease (s/p DBS), hypothyroidism (thyroid cancer s/p partial thyroidectomy), presented to ED for weakness and fatigue. Patient states that yesterday he was d/c from [facility] with home PT. Patient attempted to participate in home PT was uncomfortable with their walker. Patient states that later he has two episodes of his legs giving out from underneath him when trying to use their walker. He states he didn't fall, but slowly sat down on the ground and his family wasn't able to help him up so they called the ambulance. Patient's family would like evaluation for OG placement. He states he has also had decreased PO intake and has trouble with hard foods. Patient currently on 3 L NC, but states he just got it to make him more comfortable although was doing fine without it before.     Denies fever, chills, chest pain, palpitations, SOB, cough, abdominal pain, nausea, vomiting, constipation, urinary frequency, urgency, or dysuria, headaches, changes in vision, dizziness, numbness, tingling.      ED Course:   Vitals: BP: 115/58, HR: 75, Temp: 97.3, RR: 17, SpO2: 94% on 3 L NC  Labs: HGB: 8.4 Creatine: 1.60 EGFR: 47  UA: Urine RBC: 25, Urine Blood: Large  CXR: as per personal read, official read pending   EKG: Normal sinus rhythm  Received in the ED:  Dilaudid, Sinemet, IV 1L Bolus   (25 Dec 2023 12:04)      PAST MEDICAL HISTORY:  Hypernephroma    Medullary thyroid carcinoma        PAST SURGICAL HISTORY:  No significant past surgical history        FAMILY HISTORY:      SOCIAL HISTORY:    Allergies    No Known Allergies    Intolerances      Home Medications:  aspirin 81 mg oral tablet: 1 tab(s) orally once a day (25 Dec 2023 13:04)  atorvastatin 20 mg oral tablet: 1 tab(s) orally once a day (25 Dec 2023 13:08)  carbidopa-levodopa 25 mg-250 mg oral tablet: 0.5 tab(s) orally 4 times a day (25 Dec 2023 13:08)  diphenoxylate-atropine 2.5 mg-0.025 mg oral tablet: 1 tab(s) orally 3 times a day as needed for  diarrhea (25 Dec 2023 13:09)  levothyroxine 125 mcg (0.125 mg) oral tablet: 1 tab(s) orally once a day (25 Dec 2023 13:09)  magnesium oxide 400 mg oral tablet: 1 tab(s) orally 3 times a day (25 Dec 2023 13:09)  metoprolol succinate 50 mg oral tablet, extended release: 1 tab(s) orally once a day (25 Dec 2023 13:03)  tamsulosin 0.4 mg oral capsule: 2 cap(s) orally once a day (at bedtime) (25 Dec 2023 13:09)    MEDICATIONS  (STANDING):  aspirin  chewable 81 milliGRAM(s) Oral daily  atorvastatin 20 milliGRAM(s) Oral at bedtime  carbidopa/levodopa  25/250 0.5 Tablet(s) Oral four times a day  levothyroxine 125 MICROGram(s) Oral daily  magnesium oxide 400 milliGRAM(s) Oral three times a day with meals  metoprolol succinate ER 50 milliGRAM(s) Oral daily  sodium chloride 0.9%. 1000 milliLiter(s) (125 mL/Hr) IV Continuous <Continuous>  tamsulosin 0.8 milliGRAM(s) Oral at bedtime    MEDICATIONS  (PRN):  acetaminophen     Tablet .. 650 milliGRAM(s) Oral every 6 hours PRN Temp greater or equal to 38C (100.4F), Mild Pain (1 - 3)  diphenoxylate/atropine 1 Tablet(s) Oral three times a day PRN for diarrhea  HYDROmorphone  Injectable 0.5 milliGRAM(s) IV Push four times a day PRN Severe Pain (7 - 10)  HYDROmorphone  Injectable 0.2 milliGRAM(s) IV Push four times a day PRN Moderate Pain (4 - 6)  melatonin 3 milliGRAM(s) Oral at bedtime PRN Insomnia  ondansetron Injectable 4 milliGRAM(s) IV Push every 8 hours PRN Nausea and/or Vomiting      REVIEW OF SYSTEMS:  General:   Respiratory: No cough, SOB  Cardiovascular: No CP or Palpitations	  Gastrointestinal: No nausea, Vomiting. No diarrhea  Genitourinary: No urinary complaints	  Musculoskeletal: No leg swelling, No new rash or lesions	  Neurological: 	  all other systems negative    T(F): 98.2 (12-26-23 @ 05:10), Max: 98.2 (12-26-23 @ 05:10)  HR: 80 (12-26-23 @ 05:10) (74 - 81)  BP: 104/53 (12-26-23 @ 05:10) (104/53 - 115/55)  RR: 18 (12-26-23 @ 05:10) (16 - 18)  SpO2: 95% (12-26-23 @ 05:10) (93% - 95%)  Wt(kg): --    PHYSICAL EXAM:  General: NAD  Respiratory: b/l air entry  Cardiovascular: S1 S2  Gastrointestinal: soft  Extremities: edema        12-26    140  |  110<H>  |  15  ----------------------------<  85  3.2<L>   |  22  |  0.95    Ca    7.9<L>      26 Dec 2023 07:40  Mg     2.0     12-24    TPro  x   /  Alb  2.0<L>  /  TBili  x   /  DBili  x   /  AST  36  /  ALT  24  /  AlkPhos  x   12-26                          8.6    5.75  )-----------( 310      ( 26 Dec 2023 07:40 )             27.7       Potassium: 3.2 mmol/L (12-26 @ 07:40)  Blood Urea Nitrogen: 15 mg/dL (12-26 @ 07:40)  Calcium: 7.9 mg/dL (12-26 @ 07:40)  Hemoglobin: 8.6 g/dL (12-26 @ 07:40)      Creatinine, Serum: 0.95 (12-26 @ 07:40)  Creatinine, Serum: 1.60 (12-24 @ 20:20)      Urinalysis Basic - ( 26 Dec 2023 07:40 )    Color: x / Appearance: x / SG: x / pH: x  Gluc: 85 mg/dL / Ketone: x  / Bili: x / Urobili: x   Blood: x / Protein: x / Nitrite: x   Leuk Esterase: x / RBC: x / WBC x   Sq Epi: x / Non Sq Epi: x / Bacteria: x      LIVER FUNCTIONS - ( 26 Dec 2023 07:40 )  Alb: 2.0 g/dL / Pro: x     / ALK PHOS: x     / ALT: 24 U/L / AST: 36 U/L / GGT: x                       I&O's Detail    25 Dec 2023 07:01  -  26 Dec 2023 07:00  --------------------------------------------------------  IN:    sodium chloride 0.9%: 1375 mL  Total IN: 1375 mL    OUT:    Voided (mL): 1200 mL  Total OUT: 1200 mL    Total NET: 175 mL               Patient is a 68y old  Male who presents with a chief complaint of Weakness/Fatigue (25 Dec 2023 12:04)    HPI:  68M hx urothelial cancer (on Enfortumab Vedotin and Pembrolizumab last treatment 12/13, s/p L ureteral stent s/p exchange, mets to lung), R kidney stones (s/p R ureteroscopy w/ stone removal 8/2023), BPH, parkinson disease (s/p DBS), hypothyroidism (thyroid cancer s/p partial thyroidectomy), presented to ED for weakness and fatigue. Patient states that yesterday he was d/c from [facility] with home PT. Patient attempted to participate in home PT was uncomfortable with their walker. Patient states that later he has two episodes of his legs giving out from underneath him when trying to use their walker. He states he didn't fall, but slowly sat down on the ground and his family wasn't able to help him up so they called the ambulance. Patient's family would like evaluation for OG placement. He states he has also had decreased PO intake and has trouble with hard foods. Patient currently on 3 L NC, but states he just got it to make him more comfortable although was doing fine without it before.     Denies fever, chills, chest pain, palpitations, SOB, cough, abdominal pain, nausea, vomiting, constipation, urinary frequency, urgency, or dysuria, headaches, changes in vision, dizziness, numbness, tingling.      ED Course:   Vitals: BP: 115/58, HR: 75, Temp: 97.3, RR: 17, SpO2: 94% on 3 L NC  Labs: HGB: 8.4 Creatine: 1.60 EGFR: 47  UA: Urine RBC: 25, Urine Blood: Large  CXR: as per personal read, official read pending   EKG: Normal sinus rhythm  Received in the ED:  Dilaudid, Sinemet, IV 1L Bolus   (25 Dec 2023 12:04)    Renal consult called for MANISHA. History obtained from chart and patient's family at bedside.       PAST MEDICAL HISTORY:  Hypernephroma    Medullary thyroid carcinoma        PAST SURGICAL HISTORY:  No significant past surgical history        FAMILY HISTORY:      SOCIAL HISTORY: No smoking or alcohol use     Allergies    No Known Allergies    Intolerances      Home Medications:  aspirin 81 mg oral tablet: 1 tab(s) orally once a day (25 Dec 2023 13:04)  atorvastatin 20 mg oral tablet: 1 tab(s) orally once a day (25 Dec 2023 13:08)  carbidopa-levodopa 25 mg-250 mg oral tablet: 0.5 tab(s) orally 4 times a day (25 Dec 2023 13:08)  diphenoxylate-atropine 2.5 mg-0.025 mg oral tablet: 1 tab(s) orally 3 times a day as needed for  diarrhea (25 Dec 2023 13:09)  levothyroxine 125 mcg (0.125 mg) oral tablet: 1 tab(s) orally once a day (25 Dec 2023 13:09)  magnesium oxide 400 mg oral tablet: 1 tab(s) orally 3 times a day (25 Dec 2023 13:09)  metoprolol succinate 50 mg oral tablet, extended release: 1 tab(s) orally once a day (25 Dec 2023 13:03)  tamsulosin 0.4 mg oral capsule: 2 cap(s) orally once a day (at bedtime) (25 Dec 2023 13:09)    MEDICATIONS  (STANDING):  aspirin  chewable 81 milliGRAM(s) Oral daily  atorvastatin 20 milliGRAM(s) Oral at bedtime  carbidopa/levodopa  25/250 0.5 Tablet(s) Oral four times a day  levothyroxine 125 MICROGram(s) Oral daily  magnesium oxide 400 milliGRAM(s) Oral three times a day with meals  metoprolol succinate ER 50 milliGRAM(s) Oral daily  sodium chloride 0.9%. 1000 milliLiter(s) (125 mL/Hr) IV Continuous <Continuous>  tamsulosin 0.8 milliGRAM(s) Oral at bedtime    MEDICATIONS  (PRN):  acetaminophen     Tablet .. 650 milliGRAM(s) Oral every 6 hours PRN Temp greater or equal to 38C (100.4F), Mild Pain (1 - 3)  diphenoxylate/atropine 1 Tablet(s) Oral three times a day PRN for diarrhea  HYDROmorphone  Injectable 0.5 milliGRAM(s) IV Push four times a day PRN Severe Pain (7 - 10)  HYDROmorphone  Injectable 0.2 milliGRAM(s) IV Push four times a day PRN Moderate Pain (4 - 6)  melatonin 3 milliGRAM(s) Oral at bedtime PRN Insomnia  ondansetron Injectable 4 milliGRAM(s) IV Push every 8 hours PRN Nausea and/or Vomiting      REVIEW OF SYSTEMS:  General: weak  Respiratory: No cough, SOB  Cardiovascular: No CP or Palpitations	  Gastrointestinal: Poor appetite  Genitourinary: No urinary complaints	  Musculoskeletal: No new rash or lesions	  all other systems negative    T(F): 98.2 (12-26-23 @ 05:10), Max: 98.2 (12-26-23 @ 05:10)  HR: 80 (12-26-23 @ 05:10) (74 - 81)  BP: 104/53 (12-26-23 @ 05:10) (104/53 - 115/55)  RR: 18 (12-26-23 @ 05:10) (16 - 18)  SpO2: 95% (12-26-23 @ 05:10) (93% - 95%)  Wt(kg): --    PHYSICAL EXAM:  General: NAD  Respiratory: b/l air entry  Cardiovascular: S1 S2  Gastrointestinal: soft  Extremities: + edema        12-26    140  |  110<H>  |  15  ----------------------------<  85  3.2<L>   |  22  |  0.95    Ca    7.9<L>      26 Dec 2023 07:40  Mg     2.0     12-24    TPro  x   /  Alb  2.0<L>  /  TBili  x   /  DBili  x   /  AST  36  /  ALT  24  /  AlkPhos  x   12-26                          8.6    5.75  )-----------( 310      ( 26 Dec 2023 07:40 )             27.7       Potassium: 3.2 mmol/L (12-26 @ 07:40)  Blood Urea Nitrogen: 15 mg/dL (12-26 @ 07:40)  Calcium: 7.9 mg/dL (12-26 @ 07:40)  Hemoglobin: 8.6 g/dL (12-26 @ 07:40)      Creatinine, Serum: 0.95 (12-26 @ 07:40)  Creatinine, Serum: 1.60 (12-24 @ 20:20)      Urinalysis Basic - ( 26 Dec 2023 07:40 )    Color: x / Appearance: x / SG: x / pH: x  Gluc: 85 mg/dL / Ketone: x  / Bili: x / Urobili: x   Blood: x / Protein: x / Nitrite: x   Leuk Esterase: x / RBC: x / WBC x   Sq Epi: x / Non Sq Epi: x / Bacteria: x      LIVER FUNCTIONS - ( 26 Dec 2023 07:40 )  Alb: 2.0 g/dL / Pro: x     / ALK PHOS: x     / ALT: 24 U/L / AST: 36 U/L / GGT: x           I&O's Detail    25 Dec 2023 07:01  -  26 Dec 2023 07:00  --------------------------------------------------------  IN:    sodium chloride 0.9%: 1375 mL  Total IN: 1375 mL    OUT:    Voided (mL): 1200 mL  Total OUT: 1200 mL    Total NET: 175 mL               Patient is a 68y old  Male who presents with a chief complaint of Weakness/Fatigue (25 Dec 2023 12:04)    HPI:  68M hx urothelial cancer (on Enfortumab Vedotin and Pembrolizumab last treatment 12/13, s/p L ureteral stent s/p exchange, mets to lung), R kidney stones (s/p R ureteroscopy w/ stone removal 8/2023), BPH, parkinson disease (s/p DBS), hypothyroidism (thyroid cancer s/p partial thyroidectomy), presented to ED for weakness and fatigue. Patient states that yesterday he was d/c from [facility] with home PT. Patient attempted to participate in home PT was uncomfortable with their walker. Patient states that later he has two episodes of his legs giving out from underneath him when trying to use their walker. He states he didn't fall, but slowly sat down on the ground and his family wasn't able to help him up so they called the ambulance. Patient's family would like evaluation for OG placement. He states he has also had decreased PO intake and has trouble with hard foods. Patient currently on 3 L NC, but states he just got it to make him more comfortable although was doing fine without it before.     Denies fever, chills, chest pain, palpitations, SOB, cough, abdominal pain, nausea, vomiting, constipation, urinary frequency, urgency, or dysuria, headaches, changes in vision, dizziness, numbness, tingling.      ED Course:   Vitals: BP: 115/58, HR: 75, Temp: 97.3, RR: 17, SpO2: 94% on 3 L NC  Labs: HGB: 8.4 Creatine: 1.60 EGFR: 47  UA: Urine RBC: 25, Urine Blood: Large  CXR: as per personal read, official read pending   EKG: Normal sinus rhythm  Received in the ED:  Dilaudid, Sinemet, IV 1L Bolus   (25 Dec 2023 12:04)    Renal consult called for MANISHA. History obtained from chart and patient's family at bedside.       PAST MEDICAL HISTORY:  Hypernephroma    Medullary thyroid carcinoma        PAST SURGICAL HISTORY:  No significant past surgical history        FAMILY HISTORY:      SOCIAL HISTORY: No smoking or alcohol use     Allergies    No Known Allergies    Intolerances      Home Medications:  aspirin 81 mg oral tablet: 1 tab(s) orally once a day (25 Dec 2023 13:04)  atorvastatin 20 mg oral tablet: 1 tab(s) orally once a day (25 Dec 2023 13:08)  carbidopa-levodopa 25 mg-250 mg oral tablet: 0.5 tab(s) orally 4 times a day (25 Dec 2023 13:08)  diphenoxylate-atropine 2.5 mg-0.025 mg oral tablet: 1 tab(s) orally 3 times a day as needed for  diarrhea (25 Dec 2023 13:09)  levothyroxine 125 mcg (0.125 mg) oral tablet: 1 tab(s) orally once a day (25 Dec 2023 13:09)  magnesium oxide 400 mg oral tablet: 1 tab(s) orally 3 times a day (25 Dec 2023 13:09)  metoprolol succinate 50 mg oral tablet, extended release: 1 tab(s) orally once a day (25 Dec 2023 13:03)  tamsulosin 0.4 mg oral capsule: 2 cap(s) orally once a day (at bedtime) (25 Dec 2023 13:09)    MEDICATIONS  (STANDING):  aspirin  chewable 81 milliGRAM(s) Oral daily  atorvastatin 20 milliGRAM(s) Oral at bedtime  carbidopa/levodopa  25/250 0.5 Tablet(s) Oral four times a day  levothyroxine 125 MICROGram(s) Oral daily  magnesium oxide 400 milliGRAM(s) Oral three times a day with meals  metoprolol succinate ER 50 milliGRAM(s) Oral daily  sodium chloride 0.9%. 1000 milliLiter(s) (125 mL/Hr) IV Continuous <Continuous>  tamsulosin 0.8 milliGRAM(s) Oral at bedtime    MEDICATIONS  (PRN):  acetaminophen     Tablet .. 650 milliGRAM(s) Oral every 6 hours PRN Temp greater or equal to 38C (100.4F), Mild Pain (1 - 3)  diphenoxylate/atropine 1 Tablet(s) Oral three times a day PRN for diarrhea  HYDROmorphone  Injectable 0.5 milliGRAM(s) IV Push four times a day PRN Severe Pain (7 - 10)  HYDROmorphone  Injectable 0.2 milliGRAM(s) IV Push four times a day PRN Moderate Pain (4 - 6)  melatonin 3 milliGRAM(s) Oral at bedtime PRN Insomnia  ondansetron Injectable 4 milliGRAM(s) IV Push every 8 hours PRN Nausea and/or Vomiting      REVIEW OF SYSTEMS:  General: weak  Respiratory: No cough, SOB  Cardiovascular: No CP or Palpitations	  Gastrointestinal: Poor appetite  Genitourinary: No urinary complaints	  Musculoskeletal: No new rash or lesions	  all other systems negative    T(F): 98.2 (12-26-23 @ 05:10), Max: 98.2 (12-26-23 @ 05:10)  HR: 80 (12-26-23 @ 05:10) (74 - 81)  BP: 104/53 (12-26-23 @ 05:10) (104/53 - 115/55)  RR: 18 (12-26-23 @ 05:10) (16 - 18)  SpO2: 95% (12-26-23 @ 05:10) (93% - 95%)  Wt(kg): --    PHYSICAL EXAM:  General: NAD  Respiratory: b/l air entry  Cardiovascular: S1 S2  Gastrointestinal: soft  Extremities: + edema        12-26    140  |  110<H>  |  15  ----------------------------<  85  3.2<L>   |  22  |  0.95    Ca    7.9<L>      26 Dec 2023 07:40  Mg     2.0     12-24    TPro  x   /  Alb  2.0<L>  /  TBili  x   /  DBili  x   /  AST  36  /  ALT  24  /  AlkPhos  x   12-26                          8.6    5.75  )-----------( 310      ( 26 Dec 2023 07:40 )             27.7       Potassium: 3.2 mmol/L (12-26 @ 07:40)  Blood Urea Nitrogen: 15 mg/dL (12-26 @ 07:40)  Calcium: 7.9 mg/dL (12-26 @ 07:40)  Hemoglobin: 8.6 g/dL (12-26 @ 07:40)      Creatinine, Serum: 0.95 (12-26 @ 07:40)  Creatinine, Serum: 1.60 (12-24 @ 20:20)      Urinalysis Basic - ( 26 Dec 2023 07:40 )    Color: x / Appearance: x / SG: x / pH: x  Gluc: 85 mg/dL / Ketone: x  / Bili: x / Urobili: x   Blood: x / Protein: x / Nitrite: x   Leuk Esterase: x / RBC: x / WBC x   Sq Epi: x / Non Sq Epi: x / Bacteria: x      LIVER FUNCTIONS - ( 26 Dec 2023 07:40 )  Alb: 2.0 g/dL / Pro: x     / ALK PHOS: x     / ALT: 24 U/L / AST: 36 U/L / GGT: x           I&O's Detail    25 Dec 2023 07:01  -  26 Dec 2023 07:00  --------------------------------------------------------  IN:    sodium chloride 0.9%: 1375 mL  Total IN: 1375 mL    OUT:    Voided (mL): 1200 mL  Total OUT: 1200 mL    Total NET: 175 mL

## 2023-12-26 NOTE — CONSULT NOTE ADULT - ASSESSMENT
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 Post CDDP/Gemzar, then Keytruda, now Keytruda/Padcev post C#2 12/13)  s/p L ureteral stent s/p exchange, mets to lung), R kidney stones (s/p R ureteroscopy w/ stone removal 8/2023), BPH, Parkinson disease (s/p DBS), hypothyroidism (thyroid cancer s/p partial thyroidectomy),  Pt adm [facility] Fri--Sun for weakness, diarrhea, azotemia(reports Cr 4), once home still w sig weakness/legs giving out when try to ambulate, diarrhea, and brought to [place] ER  Has been on po Lomotil and Immondium. No nausea but poor po. no rash or SOB or fever  Pt reports sx new since starting the new immunotherapy regimen    12/24 CBC sig for Hgb 8.4 BUN/Cr 23/1,6  today, 12/26, Hgb 8.6, BN/Cr12/0.95, still sig weakness,but no diarrhea/BM today      -likely side effect due to Keytruda/Padcev, w weakness, diarrhea, azotemia  -diarrhea and azotemia appear resolved  -anemia due to chronic ds, malignancy, acute illness, cancer treatment and poor response during stress, Hbb stable, no intervention needed at this level  -pt and wife requesting discharge to Rehab to improve mobility, strength  -they have been in touch w their [facility] Onc and updates. 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 Post CDDP/Gemzar, then Keytruda, now Keytruda/Padcev post C#2 12/13)  s/p L ureteral stent s/p exchange, mets to lung), R kidney stones (s/p R ureteroscopy w/ stone removal 8/2023), BPH, Parkinson disease (s/p DBS), hypothyroidism (thyroid cancer s/p partial thyroidectomy),  Pt adm [facility] Fri--Sun for weakness, diarrhea, azotemia(reports Cr 4), once home still w sig weakness/legs giving out when try to ambulate, diarrhea, and brought to [place] ER  Has been on po Lomotil and Immondium. No nausea but poor po. no rash or SOB or fever  Pt reports sx new since starting the new immunotherapy regimen    12/24 CBC sig for Hgb 8.4 BUN/Cr 23/1,6  today, 12/26, Hgb 8.6, BN/Cr12/0.95, still sig weakness,but no diarrhea/BM today      -likely side effect due to Keytruda/Padcev, w weakness, diarrhea, azotemia  -diarrhea and azotemia appear resolved  -anemia due to chronic ds, malignancy, acute illness, cancer treatment and poor response during stress, Hbb stable, no intervention needed at this level  -pt and wife requesting discharge to Rehab to improve mobility, strength  -they have been in touch w their [facility] Onc and updates. Hold next cycle of treatment, will need much improved performance status, may need dose adjustments  -continue supportive and symptomatic management    thank you, please call if any qeustions

## 2023-12-26 NOTE — OCCUPATIONAL THERAPY INITIAL EVALUATION ADULT - ADDITIONAL COMMENTS
Pt reports he lives with his wife in a condo with elevator access. Bathroom has a stall shower with grab bars and shower chair. PTA he used a RW for functional mobility and wife assisted PRN with ADLs. (-) . Pt has h/o PD and has been receiving chemo for Ca mets.

## 2023-12-26 NOTE — OCCUPATIONAL THERAPY INITIAL EVALUATION ADULT - TRANSFER TRAINING, PT EVAL
Pt will improve sit to/from stands to minAx1 (+) RW in prep for safe commode transfers within 2-5 sessions.

## 2023-12-26 NOTE — OCCUPATIONAL THERAPY INITIAL EVALUATION ADULT - PHYSICAL ASSIST/NONPHYSICAL ASSIST: SIT/STAND, REHAB EVAL
cues for anterior weight shift and upright posture/verbal cues/nonverbal cues (demo/gestures)/1 person assist

## 2023-12-26 NOTE — OCCUPATIONAL THERAPY INITIAL EVALUATION ADULT - PHYSICAL ASSIST/NONPHYSICAL ASSIST: GROOMING, OT EVAL
Initial Anesthesia Post-op Note    Patient: George Molina  Procedure(s) Performed: CHOLECYSTECTOMY, LAPAROSCOPIC WITH GRAMS  Anesthesia type: General    Vitals Value Taken Time   Temp 97.4 7/17/2020  5:06 PM   Pulse 70 7/17/2020  5:05 PM   Resp 23 7/17/2020  5:05 PM   SpO2 93 % 7/17/2020  5:05 PM   /79 7/17/2020  5:02 PM   Vitals shown include unvalidated device data.    Last 24 I/O:     Intake/Output Summary (Last 24 hours) at 7/17/2020 1706  Last data filed at 7/17/2020 1704  Gross per 24 hour   Intake 2096 ml   Output 500 ml   Net 1596 ml         Patient Location: PACU Phase 1  Post-op Vital Signs:stable  Level of Consciousness: participates in exam and alert  Respiratory Status: spontaneous ventilation and unassisted  Cardiovascular blood pressure returned to baseline  Hydration: euvolemic  Pain Management: well controlled  Handoff: Handoff to receiving clinician was performed and questions were answered  Nausea: None  Airway Patency:patent  Post-op Assessment: no complications and patient tolerated procedure well with no complications    
set-up required

## 2023-12-26 NOTE — PROGRESS NOTE ADULT - PROBLEM SELECTOR PLAN 2
On Enfortumab Vedotin and Pembrolizumab. Mets to lung.  L ureteral stent, c/b worsenening L hydro of non-functioning kidney  - urology consulted at [facility] 12/22, no intervention  - [place] [facility]  - pt has + RBC in urine, unclear if any mets to head, will f/u heme-onc whether to give blood thinner/DVT proph given hypervascualrity of RCC On Enfortumab Vedotin and Pembrolizumab. Mets to lung.  L ureteral stent, c/b worsenening L hydro of non-functioning kidney  - urology consulted at [facility] 12/22, no intervention  - f/u [place] [facility]  - pt has + RBC in urine, unclear if any mets to head, will f/u heme-onc whether to give blood thinner/DVT proph given hypervascualrity of RCC

## 2023-12-26 NOTE — OCCUPATIONAL THERAPY INITIAL EVALUATION ADULT - RANGE OF MOTION EXAMINATION, UPPER EXTREMITY
except B/L shoulders- pt reports h/o RTC tears. Left shoulder approx 0-90 degrees AROM, right shoulder <1/2 range. WFL BUE opposition x5 digits BUE/bilateral UE Active ROM was WFL  (within functional limits)

## 2023-12-26 NOTE — GOALS OF CARE CONVERSATION - ADVANCED CARE PLANNING - CONVERSATION DETAILS
Writer met with pt and wife /HCP Rin. Reviewed patient's medical and social history as well as events leading to patient's hospitalization. Writer discussed patient's current diagnosis (weakness, fatigue, parkinsons, hypothyroid, papillary thyroid cancer, urotelial cancer,w/ mets to lungs, kidney stones,BPH), medical condition and management, prognosis, and life expectancy. Inquired about patient's wishes regarding extent of medical care to be provided including escalation of medical care into the ICU and use of vasopressor support. In addition, the writer inquired about thoughts regarding cardiopulmonary resuscitation, artificial nutrition and hydration including use of feeding tubes and IVF, antibiotics, and further investigative studies such as blood draws and radiology. Both  showed good  insight into medical condition. All questions answered.Wife states he needs to go to rehab. He is full code and still wants treatment. Psychosocial support provided.

## 2023-12-26 NOTE — PROGRESS NOTE ADULT - PROBLEM SELECTOR PLAN 1
Patient s/p d/c from [facility] 12/24, admitted to [place] for weakness, fatigue  - maintenance IVF for BP  - increase oral intake as tolerated, Nutrition consult  - PT eval for OG placement, OT eval  - Heme/onc (Dr. Modi) consulted, recs appreciated: Hold next cycle of treatment, will need much improved performance status, may need dose adjustments Patient s/p d/c from [facility] 12/24, admitted to [place] for weakness, fatigue  - maintenance IVF for BP  - increase oral intake as tolerated, Nutrition consult  - PT eval for OG placement, OT eval  - Heme/onc (Dr. Modi) consulted, recs appreciated: Hold next cycle of treatment, will need much improved performance status, may need dose adjustments

## 2023-12-26 NOTE — PROGRESS NOTE ADULT - PROBLEM SELECTOR PLAN 3
- level 3 LN biopsy confirmed s/p partial thyroidectomy and left modified neck  dissection with Dr. Della Colby on 10/17/2023- 2/26 LN metastatic papillary  - pathology revealing mixed type with medullary component - sporadic  - on levothyroxine 125mcg (home med)  - f/u TSH, Free T4, calcitonin  - [facility] [place] Penny Dailey follow up after discharge - level 3 LN biopsy confirmed s/p partial thyroidectomy and left modified neck  dissection with Dr. Della Colby on 10/17/2023- 2/26 LN metastatic papillary  - pathology revealing mixed type with medullary component - sporadic  - on levothyroxine 125mcg (home med)  - f/u TSH, Free T4, calcitonin  - [facility] Dr. Penny Dailey follow up after discharge

## 2023-12-26 NOTE — PHYSICAL THERAPY INITIAL EVALUATION ADULT - PERTINENT HX OF CURRENT PROBLEM, REHAB EVAL
Patient is a 68-year-old white male with history of renal cell carcinoma as well as thyroid carcinoma most recently treated at [facility] and [facility] discharged from [facility] earlier today back here for worsening weakness fatigue inability to walk without significant assistance.  No vomiting no diarrhea no cough no shortness of breath no fever no chills. Wife and son were able to prevent patient from falling. Patient is a 68-year-old white male with history of renal cell carcinoma as well as thyroid carcinoma most recently treated at [facility] and [facility] discharged from [facility] earlier today back here for worsening weakness fatigue inability to walk without significant assistance.  No vomiting no diarrhea no cough no shortness of breath no fever no chills. Wife and son were able to prevent patient from falling.

## 2023-12-26 NOTE — CONSULT NOTE ADULT - ASSESSMENT
MANISHA: Prerenal azotemia  h/o Urothelial Ca  Anemia    Improving renal indices with IV hydration. Will lower rate. To continue current meds. Monitor h/h trend. Transfuse PRBC's PRN.   D/w patients family at bedside. Will follow electrolytes and renal function trend.     Further recommendations pending clinical course. Thank you for the courtesy of this referral.

## 2023-12-27 LAB
ANION GAP SERPL CALC-SCNC: 4 MMOL/L — LOW (ref 5–17)
ANION GAP SERPL CALC-SCNC: 4 MMOL/L — LOW (ref 5–17)
BASOPHILS # BLD AUTO: 0.04 K/UL — SIGNIFICANT CHANGE UP (ref 0–0.2)
BASOPHILS # BLD AUTO: 0.04 K/UL — SIGNIFICANT CHANGE UP (ref 0–0.2)
BASOPHILS NFR BLD AUTO: 0.7 % — SIGNIFICANT CHANGE UP (ref 0–2)
BASOPHILS NFR BLD AUTO: 0.7 % — SIGNIFICANT CHANGE UP (ref 0–2)
BUN SERPL-MCNC: 13 MG/DL — SIGNIFICANT CHANGE UP (ref 7–23)
BUN SERPL-MCNC: 13 MG/DL — SIGNIFICANT CHANGE UP (ref 7–23)
CALCIUM SERPL-MCNC: 7.6 MG/DL — LOW (ref 8.5–10.1)
CALCIUM SERPL-MCNC: 7.6 MG/DL — LOW (ref 8.5–10.1)
CHLORIDE SERPL-SCNC: 111 MMOL/L — HIGH (ref 96–108)
CHLORIDE SERPL-SCNC: 111 MMOL/L — HIGH (ref 96–108)
CO2 SERPL-SCNC: 26 MMOL/L — SIGNIFICANT CHANGE UP (ref 22–31)
CO2 SERPL-SCNC: 26 MMOL/L — SIGNIFICANT CHANGE UP (ref 22–31)
CREAT SERPL-MCNC: 0.86 MG/DL — SIGNIFICANT CHANGE UP (ref 0.5–1.3)
CREAT SERPL-MCNC: 0.86 MG/DL — SIGNIFICANT CHANGE UP (ref 0.5–1.3)
EGFR: 95 ML/MIN/1.73M2 — SIGNIFICANT CHANGE UP
EGFR: 95 ML/MIN/1.73M2 — SIGNIFICANT CHANGE UP
EOSINOPHIL # BLD AUTO: 0 K/UL — SIGNIFICANT CHANGE UP (ref 0–0.5)
EOSINOPHIL # BLD AUTO: 0 K/UL — SIGNIFICANT CHANGE UP (ref 0–0.5)
EOSINOPHIL NFR BLD AUTO: 0 % — SIGNIFICANT CHANGE UP (ref 0–6)
EOSINOPHIL NFR BLD AUTO: 0 % — SIGNIFICANT CHANGE UP (ref 0–6)
GLUCOSE SERPL-MCNC: 95 MG/DL — SIGNIFICANT CHANGE UP (ref 70–99)
GLUCOSE SERPL-MCNC: 95 MG/DL — SIGNIFICANT CHANGE UP (ref 70–99)
HCT VFR BLD CALC: 28.7 % — LOW (ref 39–50)
HCT VFR BLD CALC: 28.7 % — LOW (ref 39–50)
HGB BLD-MCNC: 8.7 G/DL — LOW (ref 13–17)
HGB BLD-MCNC: 8.7 G/DL — LOW (ref 13–17)
IMM GRANULOCYTES NFR BLD AUTO: 1.1 % — HIGH (ref 0–0.9)
IMM GRANULOCYTES NFR BLD AUTO: 1.1 % — HIGH (ref 0–0.9)
LYMPHOCYTES # BLD AUTO: 0.69 K/UL — LOW (ref 1–3.3)
LYMPHOCYTES # BLD AUTO: 0.69 K/UL — LOW (ref 1–3.3)
LYMPHOCYTES # BLD AUTO: 12.6 % — LOW (ref 13–44)
LYMPHOCYTES # BLD AUTO: 12.6 % — LOW (ref 13–44)
MCHC RBC-ENTMCNC: 24 PG — LOW (ref 27–34)
MCHC RBC-ENTMCNC: 24 PG — LOW (ref 27–34)
MCHC RBC-ENTMCNC: 30.3 GM/DL — LOW (ref 32–36)
MCHC RBC-ENTMCNC: 30.3 GM/DL — LOW (ref 32–36)
MCV RBC AUTO: 79.1 FL — LOW (ref 80–100)
MCV RBC AUTO: 79.1 FL — LOW (ref 80–100)
MONOCYTES # BLD AUTO: 0.9 K/UL — SIGNIFICANT CHANGE UP (ref 0–0.9)
MONOCYTES # BLD AUTO: 0.9 K/UL — SIGNIFICANT CHANGE UP (ref 0–0.9)
MONOCYTES NFR BLD AUTO: 16.4 % — HIGH (ref 2–14)
MONOCYTES NFR BLD AUTO: 16.4 % — HIGH (ref 2–14)
NEUTROPHILS # BLD AUTO: 3.79 K/UL — SIGNIFICANT CHANGE UP (ref 1.8–7.4)
NEUTROPHILS # BLD AUTO: 3.79 K/UL — SIGNIFICANT CHANGE UP (ref 1.8–7.4)
NEUTROPHILS NFR BLD AUTO: 69.2 % — SIGNIFICANT CHANGE UP (ref 43–77)
NEUTROPHILS NFR BLD AUTO: 69.2 % — SIGNIFICANT CHANGE UP (ref 43–77)
NRBC # BLD: 0 /100 WBCS — SIGNIFICANT CHANGE UP (ref 0–0)
NRBC # BLD: 0 /100 WBCS — SIGNIFICANT CHANGE UP (ref 0–0)
PLATELET # BLD AUTO: 328 K/UL — SIGNIFICANT CHANGE UP (ref 150–400)
PLATELET # BLD AUTO: 328 K/UL — SIGNIFICANT CHANGE UP (ref 150–400)
POTASSIUM SERPL-MCNC: 3.6 MMOL/L — SIGNIFICANT CHANGE UP (ref 3.5–5.3)
POTASSIUM SERPL-MCNC: 3.6 MMOL/L — SIGNIFICANT CHANGE UP (ref 3.5–5.3)
POTASSIUM SERPL-SCNC: 3.6 MMOL/L — SIGNIFICANT CHANGE UP (ref 3.5–5.3)
POTASSIUM SERPL-SCNC: 3.6 MMOL/L — SIGNIFICANT CHANGE UP (ref 3.5–5.3)
RBC # BLD: 3.63 M/UL — LOW (ref 4.2–5.8)
RBC # BLD: 3.63 M/UL — LOW (ref 4.2–5.8)
RBC # FLD: 22.3 % — HIGH (ref 10.3–14.5)
RBC # FLD: 22.3 % — HIGH (ref 10.3–14.5)
SODIUM SERPL-SCNC: 141 MMOL/L — SIGNIFICANT CHANGE UP (ref 135–145)
SODIUM SERPL-SCNC: 141 MMOL/L — SIGNIFICANT CHANGE UP (ref 135–145)
WBC # BLD: 5.48 K/UL — SIGNIFICANT CHANGE UP (ref 3.8–10.5)
WBC # BLD: 5.48 K/UL — SIGNIFICANT CHANGE UP (ref 3.8–10.5)
WBC # FLD AUTO: 5.48 K/UL — SIGNIFICANT CHANGE UP (ref 3.8–10.5)
WBC # FLD AUTO: 5.48 K/UL — SIGNIFICANT CHANGE UP (ref 3.8–10.5)

## 2023-12-27 PROCEDURE — 99232 SBSQ HOSP IP/OBS MODERATE 35: CPT

## 2023-12-27 RX ORDER — ATORVASTATIN CALCIUM 80 MG/1
1 TABLET, FILM COATED ORAL
Qty: 0 | Refills: 0 | DISCHARGE
Start: 2023-12-27

## 2023-12-27 RX ORDER — ACETAMINOPHEN 500 MG
2 TABLET ORAL
Qty: 0 | Refills: 0 | DISCHARGE
Start: 2023-12-27

## 2023-12-27 RX ORDER — LEVOTHYROXINE SODIUM 125 MCG
1 TABLET ORAL
Qty: 0 | Refills: 0 | DISCHARGE
Start: 2023-12-27

## 2023-12-27 RX ORDER — ALPRAZOLAM 0.25 MG
0.25 TABLET ORAL AT BEDTIME
Refills: 0 | Status: DISCONTINUED | OUTPATIENT
Start: 2023-12-27 | End: 2023-12-28

## 2023-12-27 RX ORDER — TAMSULOSIN HYDROCHLORIDE 0.4 MG/1
2 CAPSULE ORAL
Qty: 0 | Refills: 0 | DISCHARGE
Start: 2023-12-27

## 2023-12-27 RX ORDER — METOPROLOL TARTRATE 50 MG
1 TABLET ORAL
Qty: 0 | Refills: 0 | DISCHARGE
Start: 2023-12-27

## 2023-12-27 RX ORDER — LEVOTHYROXINE SODIUM 125 MCG
150 TABLET ORAL DAILY
Refills: 0 | Status: DISCONTINUED | OUTPATIENT
Start: 2023-12-27 | End: 2023-12-28

## 2023-12-27 RX ORDER — ASPIRIN/CALCIUM CARB/MAGNESIUM 324 MG
1 TABLET ORAL
Qty: 0 | Refills: 0 | DISCHARGE
Start: 2023-12-27

## 2023-12-27 RX ORDER — CARBIDOPA AND LEVODOPA 25; 100 MG/1; MG/1
0.5 TABLET ORAL
Qty: 0 | Refills: 0 | DISCHARGE
Start: 2023-12-27

## 2023-12-27 RX ORDER — ALPRAZOLAM 0.25 MG
1 TABLET ORAL
Qty: 0 | Refills: 0 | DISCHARGE
Start: 2023-12-27

## 2023-12-27 RX ORDER — ASCORBIC ACID 60 MG
500 TABLET,CHEWABLE ORAL
Refills: 0 | Status: DISCONTINUED | OUTPATIENT
Start: 2023-12-27 | End: 2023-12-28

## 2023-12-27 RX ORDER — MAGNESIUM OXIDE 400 MG ORAL TABLET 241.3 MG
1 TABLET ORAL
Qty: 0 | Refills: 0 | DISCHARGE
Start: 2023-12-27

## 2023-12-27 RX ORDER — ASCORBIC ACID 60 MG
1 TABLET,CHEWABLE ORAL
Qty: 0 | Refills: 0 | DISCHARGE
Start: 2023-12-27

## 2023-12-27 RX ADMIN — HYDROMORPHONE HYDROCHLORIDE 0.5 MILLIGRAM(S): 2 INJECTION INTRAMUSCULAR; INTRAVENOUS; SUBCUTANEOUS at 05:07

## 2023-12-27 RX ADMIN — TAMSULOSIN HYDROCHLORIDE 0.8 MILLIGRAM(S): 0.4 CAPSULE ORAL at 21:22

## 2023-12-27 RX ADMIN — HEPARIN SODIUM 5000 UNIT(S): 5000 INJECTION INTRAVENOUS; SUBCUTANEOUS at 21:22

## 2023-12-27 RX ADMIN — MAGNESIUM OXIDE 400 MG ORAL TABLET 400 MILLIGRAM(S): 241.3 TABLET ORAL at 17:38

## 2023-12-27 RX ADMIN — CARBIDOPA AND LEVODOPA 0.5 TABLET(S): 25; 100 TABLET ORAL at 17:40

## 2023-12-27 RX ADMIN — MAGNESIUM OXIDE 400 MG ORAL TABLET 400 MILLIGRAM(S): 241.3 TABLET ORAL at 13:16

## 2023-12-27 RX ADMIN — Medication 125 MICROGRAM(S): at 05:07

## 2023-12-27 RX ADMIN — MAGNESIUM OXIDE 400 MG ORAL TABLET 400 MILLIGRAM(S): 241.3 TABLET ORAL at 09:15

## 2023-12-27 RX ADMIN — ATORVASTATIN CALCIUM 20 MILLIGRAM(S): 80 TABLET, FILM COATED ORAL at 21:22

## 2023-12-27 RX ADMIN — CARBIDOPA AND LEVODOPA 0.5 TABLET(S): 25; 100 TABLET ORAL at 13:17

## 2023-12-27 RX ADMIN — HEPARIN SODIUM 5000 UNIT(S): 5000 INJECTION INTRAVENOUS; SUBCUTANEOUS at 05:10

## 2023-12-27 RX ADMIN — Medication 500 MILLIGRAM(S): at 17:38

## 2023-12-27 RX ADMIN — CARBIDOPA AND LEVODOPA 0.5 TABLET(S): 25; 100 TABLET ORAL at 05:08

## 2023-12-27 RX ADMIN — Medication 650 MILLIGRAM(S): at 13:42

## 2023-12-27 RX ADMIN — Medication 650 MILLIGRAM(S): at 13:33

## 2023-12-27 RX ADMIN — HYDROMORPHONE HYDROCHLORIDE 0.5 MILLIGRAM(S): 2 INJECTION INTRAMUSCULAR; INTRAVENOUS; SUBCUTANEOUS at 06:07

## 2023-12-27 RX ADMIN — Medication 0.25 MILLIGRAM(S): at 21:22

## 2023-12-27 RX ADMIN — Medication 1 TABLET(S): at 17:38

## 2023-12-27 RX ADMIN — HYDROMORPHONE HYDROCHLORIDE 0.5 MILLIGRAM(S): 2 INJECTION INTRAMUSCULAR; INTRAVENOUS; SUBCUTANEOUS at 00:05

## 2023-12-27 RX ADMIN — HEPARIN SODIUM 5000 UNIT(S): 5000 INJECTION INTRAVENOUS; SUBCUTANEOUS at 13:17

## 2023-12-27 RX ADMIN — Medication 1 TABLET(S): at 18:46

## 2023-12-27 NOTE — PROGRESS NOTE ADULT - PROBLEM SELECTOR PLAN 2
On Enfortumab Vedotin and Pembrolizumab. Mets to lung.  L ureteral stent, c/b worsenening L hydro of non-functioning kidney  - urology consulted at [facility] 12/22, no intervention  - f/u with [place] [facility] [place]  - pt has + RBC in urine, unclear if any mets to head, will f/u heme-onc whether to give blood thinner/DVT proph given hypervascualrity of RCC On Enfortumab Vedotin and Pembrolizumab. Mets to lung.  L ureteral stent, c/b worsenening L hydro of non-functioning kidney  - urology consulted at [facility] 12/22, no intervention  - f/u with Onc outpatient (Dr. [place] at [facility] [place])  - pt has + RBC in urine, unclear if any mets to head, will f/u heme-onc whether to give blood thinner/DVT proph given hypervascualrity of RCC

## 2023-12-27 NOTE — DIETITIAN INITIAL EVALUATION ADULT - PERTINENT LABORATORY DATA
12-27    141  |  111<H>  |  13  ----------------------------<  95  3.6   |  26  |  0.86    Ca    7.6<L>      27 Dec 2023 07:24    TPro  5.9<L>  /  Alb  2.0<L>  /  TBili  0.8  /  DBili  x   /  AST  36  /  ALT  24  /  AlkPhos  90  12-26  A1C with Estimated Average Glucose Result: 6.4 % (12-26-23 @ 07:40)

## 2023-12-27 NOTE — PROGRESS NOTE ADULT - PROBLEM SELECTOR PLAN 3
- level 3 LN biopsy confirmed s/p partial thyroidectomy and left modified neck  dissection with Dr. Della Colby on 10/17/2023- 2/26 LN metastatic papillary  - pathology revealing mixed type with medullary component - sporadic  - on levothyroxine 125mcg (home med)  - f/u TSH, Free T4, calcitonin  - [facility] [place]. Penny Dailey follow up after discharge - level 3 LN biopsy confirmed s/p partial thyroidectomy and left modified neck  dissection with Dr. Della Colby on 10/17/2023- 2/26 LN metastatic papillary  - pathology revealing mixed type with medullary component - sporadic  - on levothyroxine 125mcg (home med)  - f/u TSH, Free T4, calcitonin  [place] [facility] [place] after discharge

## 2023-12-27 NOTE — DIETITIAN INITIAL EVALUATION ADULT - NAME AND PHONE
Namita Marshall, MS, RD, CDN, [facility]  Namita Marshall, MS, RD, CDN, [facility]

## 2023-12-27 NOTE — DIETITIAN INITIAL EVALUATION ADULT - ADD RECOMMEND
consider ordering MVI daily and vit C 500 mg BID to help promote wound healing.   message left with MD

## 2023-12-27 NOTE — PROGRESS NOTE ADULT - PROBLEM SELECTOR PLAN 4
Baseline SCr 1.2  L renal mass a/w hydronephrosis, R renal mass non-obstructive  - renal sono at [facility] 12/22 w/o L hydro, mass increased in size from 10x8 on CT  9/6/23 to 21.5cm  - Maintenance IVF in ED  - Urology consulted at [facility] 12/22, no acute urologic intervention - hydro 2/2 tumor burden in  non-functioning kidney, believed not to be contributing to MANISHA  - trend creatinine  - nephrology  - cont home tamsulosin. Baseline SCr 1.2  L renal mass a/w hydronephrosis, R renal mass non-obstructive  - [place] [facility] [place] w/o L hydro, mass increased in size from 10x8 on CT  9/6/23 to 21.5cm  - Maintenance IVF in ED  - Urology consulted at [facility] 12/22, no acute urologic intervention - hydro 2/2 tumor burden in  non-functioning kidney, believed not to be contributing to MANISHA  - trend creatinine  - nephrology  - cont home tamsulosin.

## 2023-12-27 NOTE — DIETITIAN INITIAL EVALUATION ADULT - PROBLEM SELECTOR PLAN 1
Patient s/p d/c from [facility] 12/24, admitted to [place] for weakness, fatigue  - maintenance IVF for BP  - increase oral intake as tolerated, Nutrition consult  - PT eval for OG placement, OT eval  - b/l LE doppler for pain behind knees  - pain management as appropriate Patient s/p d/c from [facility] 12/24, admitted to [place] for weakness, fatigue  - maintenance IVF for BP  - increase oral intake as tolerated, Nutrition consult  - PT eval for OG placement, OT eval  - b/l LE doppler for pain behind knees  - pain management as appropriate

## 2023-12-27 NOTE — CONSULT NOTE ADULT - SUBJECTIVE AND OBJECTIVE BOX
Patient is a 68y old  Male who presents with a chief complaint of Weakness/Fatigue (26 Dec 2023 15:51)      Reason For Consult: hypothyroidism    HPI:  68M hx urothelial cancer (on Enfortumab Vedotin and Pembrolizumab last treatment 12/13, s/p L ureteral stent s/p exchange, mets to lung), R kidney stones (s/p R ureteroscopy w/ stone removal 8/2023), BPH, parkinson disease (s/p DBS), hypothyroidism (thyroid cancer s/p partial thyroidectomy), presented to ED for weakness and fatigue. Patient states that yesterday he was d/c from [facility] with home PT. Patient attempted to participate in home PT was uncomfortable with their walker. Patient states that later he has two episodes of his legs giving out from underneath him when trying to use their walker. He states he didn't fall, but slowly sat down on the ground and his family wasn't able to help him up so they called the ambulance. Patient's family would like evaluation for OG placement. He states he has also had decreased PO intake and has trouble with hard foods. Patient currently on 3 L NC, but states he just got it to make him more comfortable although was doing fine without it before.     Denies fever, chills, chest pain, palpitations, SOB, cough, abdominal pain, nausea, vomiting, constipation, urinary frequency, urgency, or dysuria, headaches, changes in vision, dizziness, numbness, tingling.      ED Course:   Vitals: BP: 115/58, HR: 75, Temp: 97.3, RR: 17, SpO2: 94% on 3 L NC  Labs: HGB: 8.4 Creatine: 1.60 EGFR: 47  UA: Urine RBC: 25, Urine Blood: Large  CXR: as per personal read, official read pending   EKG: Normal sinus rhythm  Received in the ED:  Dilaudid, Sinemet, IV 1L Bolus   (25 Dec 2023 12:04)      PAST MEDICAL & SURGICAL HISTORY:  Hypernephroma      Medullary thyroid carcinoma      No significant past surgical history          FAMILY HISTORY:        Social History:    MEDICATIONS  (STANDING):  aspirin  chewable 81 milliGRAM(s) Oral daily  atorvastatin 20 milliGRAM(s) Oral at bedtime  carbidopa/levodopa  25/250 0.5 Tablet(s) Oral four times a day  heparin   Injectable 5000 Unit(s) SubCutaneous every 8 hours  levothyroxine 125 MICROGram(s) Oral daily  magnesium oxide 400 milliGRAM(s) Oral three times a day with meals  metoprolol succinate ER 50 milliGRAM(s) Oral daily  sodium chloride 0.9%. 1000 milliLiter(s) (75 mL/Hr) IV Continuous <Continuous>  tamsulosin 0.8 milliGRAM(s) Oral at bedtime    MEDICATIONS  (PRN):  acetaminophen     Tablet .. 650 milliGRAM(s) Oral every 6 hours PRN Temp greater or equal to 38C (100.4F), Mild Pain (1 - 3)  diphenoxylate/atropine 1 Tablet(s) Oral three times a day PRN for diarrhea  HYDROmorphone  Injectable 0.5 milliGRAM(s) IV Push four times a day PRN Severe Pain (7 - 10)  HYDROmorphone  Injectable 0.2 milliGRAM(s) IV Push four times a day PRN Moderate Pain (4 - 6)  melatonin 3 milliGRAM(s) Oral at bedtime PRN Insomnia  ondansetron Injectable 4 milliGRAM(s) IV Push every 8 hours PRN Nausea and/or Vomiting        T(C): 36.8 (12-27-23 @ 04:28), Max: 37.2 (12-26-23 @ 20:27)  HR: 82 (12-27-23 @ 04:28) (80 - 89)  BP: 127/63 (12-27-23 @ 04:28) (107/55 - 132/68)  RR: 18 (12-27-23 @ 04:28) (17 - 18)  SpO2: 93% (12-27-23 @ 04:28) (93% - 94%)  Wt(kg): --    PHYSICAL EXAM:  CHEST/LUNG: Clear to percussion bilaterally; No rales, rhonchi, wheezing, or rubs  HEART: Regular rate and rhythm; No murmurs, rubs, or gallops  ABDOMEN: Soft, Nontender, Nondistended; Bowel sounds present  EXTREMITIES:  2+ Peripheral Pulses, No clubbing, cyanosis, or edema  SKIN: No rashes or lesions    CAPILLARY BLOOD GLUCOSE                                8.7    5.48  )-----------( 328      ( 27 Dec 2023 07:24 )             28.7       CMP:  12-27 @ 07:24  SGPT --  Albumin --   Alk Phos --   Anion Gap 4   SGOT --   Total Bili --   BUN 13   Calcium Total 7.6   CO2 26   Chloride 111   Creatinine 0.86   eGFR if AA --   eGFR if non AA --   Glucose 95   Potassium 3.6   Protein --   Sodium 141      Thyroid Function Tests:  12-26 @ 07:40 TSH 59.30 FreeT4 -- T3 -- Anti TPO -- Anti Thyroglobulin Ab -- TSI --      Diabetes Tests:       Radiology:                  Patient is a 68y old  Male who presents with a chief complaint of Weakness/Fatigue (26 Dec 2023 15:51)      Reason For Consult: hypothyroidism    HPI:  68M hx urothelial cancer (on Enfortumab Vedotin and Pembrolizumab last treatment 12/13, s/p L ureteral stent s/p exchange, mets to lung), R kidney stones (s/p R ureteroscopy w/ stone removal 8/2023), BPH, parkinson disease (s/p DBS), hypothyroidism (thyroid cancer s/p partial thyroidectomy), presented to ED for weakness and fatigue. Patient states that yesterday he was d/c from [facility] with home PT. Patient attempted to participate in home PT was uncomfortable with their walker. Patient states that later he has two episodes of his legs giving out from underneath him when trying to use their walker. He states he didn't fall, but slowly sat down on the ground and his family wasn't able to help him up so they called the ambulance. Patient's family would like evaluation for OG placement. He states he has also had decreased PO intake and has trouble with hard foods. Patient currently on 3 L NC, but states he just got it to make him more comfortable although was doing fine without it before.     Denies fever, chills, chest pain, palpitations, SOB, cough, abdominal pain, nausea, vomiting, constipation, urinary frequency, urgency, or dysuria, headaches, changes in vision, dizziness, numbness, tingling.      ED Course:   Vitals: BP: 115/58, HR: 75, Temp: 97.3, RR: 17, SpO2: 94% on 3 L NC  Labs: HGB: 8.4 Creatine: 1.60 EGFR: 47  UA: Urine RBC: 25, Urine Blood: Large  CXR: as per personal read, official read pending   EKG: Normal sinus rhythm  Received in the ED:  Dilaudid, Sinemet, IV 1L Bolus   (25 Dec 2023 12:04)      PAST MEDICAL & SURGICAL HISTORY:  Hypernephroma      Medullary thyroid carcinoma      No significant past surgical history          FAMILY HISTORY:        Social History:    MEDICATIONS  (STANDING):  aspirin  chewable 81 milliGRAM(s) Oral daily  atorvastatin 20 milliGRAM(s) Oral at bedtime  carbidopa/levodopa  25/250 0.5 Tablet(s) Oral four times a day  heparin   Injectable 5000 Unit(s) SubCutaneous every 8 hours  levothyroxine 125 MICROGram(s) Oral daily  magnesium oxide 400 milliGRAM(s) Oral three times a day with meals  metoprolol succinate ER 50 milliGRAM(s) Oral daily  sodium chloride 0.9%. 1000 milliLiter(s) (75 mL/Hr) IV Continuous <Continuous>  tamsulosin 0.8 milliGRAM(s) Oral at bedtime    MEDICATIONS  (PRN):  acetaminophen     Tablet .. 650 milliGRAM(s) Oral every 6 hours PRN Temp greater or equal to 38C (100.4F), Mild Pain (1 - 3)  diphenoxylate/atropine 1 Tablet(s) Oral three times a day PRN for diarrhea  HYDROmorphone  Injectable 0.5 milliGRAM(s) IV Push four times a day PRN Severe Pain (7 - 10)  HYDROmorphone  Injectable 0.2 milliGRAM(s) IV Push four times a day PRN Moderate Pain (4 - 6)  melatonin 3 milliGRAM(s) Oral at bedtime PRN Insomnia  ondansetron Injectable 4 milliGRAM(s) IV Push every 8 hours PRN Nausea and/or Vomiting        T(C): 36.8 (12-27-23 @ 04:28), Max: 37.2 (12-26-23 @ 20:27)  HR: 82 (12-27-23 @ 04:28) (80 - 89)  BP: 127/63 (12-27-23 @ 04:28) (107/55 - 132/68)  RR: 18 (12-27-23 @ 04:28) (17 - 18)  SpO2: 93% (12-27-23 @ 04:28) (93% - 94%)  Wt(kg): --    PHYSICAL EXAM:  CHEST/LUNG: Clear to percussion bilaterally; No rales, rhonchi, wheezing, or rubs  HEART: Regular rate and rhythm; No murmurs, rubs, or gallops  ABDOMEN: Soft, Nontender, Nondistended; Bowel sounds present  EXTREMITIES:  2+ Peripheral Pulses, No clubbing, cyanosis, or edema  SKIN: No rashes or lesions    CAPILLARY BLOOD GLUCOSE                                8.7    5.48  )-----------( 328      ( 27 Dec 2023 07:24 )             28.7       CMP:  12-27 @ 07:24  SGPT --  Albumin --   Alk Phos --   Anion Gap 4   SGOT --   Total Bili --   BUN 13   Calcium Total 7.6   CO2 26   Chloride 111   Creatinine 0.86   eGFR if AA --   eGFR if non AA --   Glucose 95   Potassium 3.6   Protein --   Sodium 141      Thyroid Function Tests:  12-26 @ 07:40 TSH 59.30 FreeT4 -- T3 -- Anti TPO -- Anti Thyroglobulin Ab -- TSI --      Diabetes Tests:       Radiology:

## 2023-12-27 NOTE — DIETITIAN INITIAL EVALUATION ADULT - PERTINENT MEDS FT
MEDICATIONS  (STANDING):  aspirin  chewable 81 milliGRAM(s) Oral daily  atorvastatin 20 milliGRAM(s) Oral at bedtime  carbidopa/levodopa  25/250 0.5 Tablet(s) Oral four times a day  heparin   Injectable 5000 Unit(s) SubCutaneous every 8 hours  levothyroxine 125 MICROGram(s) Oral daily  magnesium oxide 400 milliGRAM(s) Oral three times a day with meals  metoprolol succinate ER 50 milliGRAM(s) Oral daily  sodium chloride 0.9%. 1000 milliLiter(s) (75 mL/Hr) IV Continuous <Continuous>  tamsulosin 0.8 milliGRAM(s) Oral at bedtime    MEDICATIONS  (PRN):  acetaminophen     Tablet .. 650 milliGRAM(s) Oral every 6 hours PRN Temp greater or equal to 38C (100.4F), Mild Pain (1 - 3)  diphenoxylate/atropine 1 Tablet(s) Oral three times a day PRN for diarrhea  HYDROmorphone  Injectable 0.5 milliGRAM(s) IV Push four times a day PRN Severe Pain (7 - 10)  HYDROmorphone  Injectable 0.2 milliGRAM(s) IV Push four times a day PRN Moderate Pain (4 - 6)  melatonin 3 milliGRAM(s) Oral at bedtime PRN Insomnia  ondansetron Injectable 4 milliGRAM(s) IV Push every 8 hours PRN Nausea and/or Vomiting

## 2023-12-27 NOTE — PROGRESS NOTE ADULT - PROBLEM SELECTOR PLAN 8
- SQH for DVT ppx once heme-onc okays, for now SCD
- SQH for DVT ppx once heme-onc okays, for now SCD

## 2023-12-27 NOTE — CONSULT NOTE ADULT - PROBLEM SELECTOR RECOMMENDATION 9
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

## 2023-12-27 NOTE — PROGRESS NOTE ADULT - PROBLEM SELECTOR PLAN 6
Patient has a DBS  - if patient should require a MRI for any reason, please inquire with radiology  group about MRI compatibility of device  - cont home carbidopa-levodopa
Patient has a DBS  - if patient should require a MRI for any reason, please inquire with radiology  group about MRI compatibility of device  - cont home carbidopa-levodopa

## 2023-12-27 NOTE — DIETITIAN INITIAL EVALUATION ADULT - PROBLEM SELECTOR PLAN 2
On Enfortumab Vedotin and Pembrolizumab. Mets to lung.  L ureteral stent, c/b worsenening L hydro of non-functioning kidney  - urology consulted at [facility] 12/22, no intervention  - f/[place] [facility] [place]  - pt has + RBC in urine, unclear if any mets to head, will f/u heme-onc whether to give blood thinner/DVT proph given hypervascualrity of RCC On Enfortumab Vedotin and Pembrolizumab. Mets to lung.  L ureteral stent, c/b worsenening L hydro of non-functioning kidney  - urology consulted at [facility] 12/22, no intervention  - f/u with Onc outpatient (Dr. Soriano at [facility] [place])  - pt has + RBC in urine, unclear if any mets to head, will f/u heme-onc whether to give blood thinner/DVT proph given hypervascualrity of RCC

## 2023-12-27 NOTE — PROGRESS NOTE ADULT - SUBJECTIVE AND OBJECTIVE BOX
Patient is a 68y old  Male who presents with a chief complaint of Acute renal failure     (27 Dec 2023 12:50)    Patient seen in follow up for MANISHA.        PAST MEDICAL HISTORY:  Hypernephroma    Medullary thyroid carcinoma      MEDICATIONS  (STANDING):  ascorbic acid 500 milliGRAM(s) Oral two times a day  aspirin  chewable 81 milliGRAM(s) Oral daily  atorvastatin 20 milliGRAM(s) Oral at bedtime  carbidopa/levodopa  25/250 0.5 Tablet(s) Oral four times a day  heparin   Injectable 5000 Unit(s) SubCutaneous every 8 hours  levothyroxine 150 MICROGram(s) Oral daily  magnesium oxide 400 milliGRAM(s) Oral three times a day with meals  metoprolol succinate ER 50 milliGRAM(s) Oral daily  multivitamin 1 Tablet(s) Oral daily  sodium chloride 0.9%. 1000 milliLiter(s) (75 mL/Hr) IV Continuous <Continuous>  tamsulosin 0.8 milliGRAM(s) Oral at bedtime    MEDICATIONS  (PRN):  acetaminophen     Tablet .. 650 milliGRAM(s) Oral every 6 hours PRN Temp greater or equal to 38C (100.4F), Mild Pain (1 - 3)  ALPRAZolam 0.25 milliGRAM(s) Oral at bedtime PRN anxiety  diphenoxylate/atropine 1 Tablet(s) Oral three times a day PRN for diarrhea  HYDROmorphone  Injectable 0.5 milliGRAM(s) IV Push four times a day PRN Severe Pain (7 - 10)  HYDROmorphone  Injectable 0.2 milliGRAM(s) IV Push four times a day PRN Moderate Pain (4 - 6)  melatonin 3 milliGRAM(s) Oral at bedtime PRN Insomnia  ondansetron Injectable 4 milliGRAM(s) IV Push every 8 hours PRN Nausea and/or Vomiting    T(C): 36.6 (12-27-23 @ 12:35), Max: 37.2 (12-26-23 @ 20:27)  HR: 84 (12-27-23 @ 12:35) (74 - 89)  BP: 123/66 (12-27-23 @ 12:35) (104/53 - 132/68)  RR: 18 (12-27-23 @ 12:35)  SpO2: 93% (12-27-23 @ 12:35)  Wt(kg): --  I&O's Detail    26 Dec 2023 07:01  -  27 Dec 2023 07:00  --------------------------------------------------------  IN:  Total IN: 0 mL    OUT:    Voided (mL): 1200 mL  Total OUT: 1200 mL    Total NET: -1200 mL              PHYSICAL EXAM:  General: No distress  Respiratory: b/l air entry  Cardiovascular: S1 S2  Gastrointestinal: soft  Extremities:  edema                            LABORATORY:                        8.7    5.48  )-----------( 328      ( 27 Dec 2023 07:24 )             28.7     12-27    141  |  111<H>  |  13  ----------------------------<  95  3.6   |  26  |  0.86    Ca    7.6<L>      27 Dec 2023 07:24    TPro  5.9<L>  /  Alb  2.0<L>  /  TBili  0.8  /  DBili  x   /  AST  36  /  ALT  24  /  AlkPhos  90  12-26    Sodium: 141 mmol/L (12-27 @ 07:24)  Sodium: 140 mmol/L (12-26 @ 07:40)    Potassium: 3.6 mmol/L (12-27 @ 07:24)  Potassium: 3.2 mmol/L (12-26 @ 07:40)    Hemoglobin: 8.7 g/dL (12-27 @ 07:24)  Hemoglobin: 8.6 g/dL (12-26 @ 07:40)  Hemoglobin: 8.4 g/dL (12-24 @ 20:20)    Creatinine, Serum 0.86 (12-27 @ 07:24)  Creatinine, Serum 0.95 (12-26 @ 07:40)  Creatinine, Serum 1.60 (12-24 @ 20:20)        LIVER FUNCTIONS - ( 26 Dec 2023 07:40 )  Alb: 2.0 g/dL / Pro: 5.9 g/dL / ALK PHOS: 90 U/L / ALT: 24 U/L / AST: 36 U/L / GGT: x           Urinalysis Basic - ( 27 Dec 2023 07:24 )    Color: x / Appearance: x / SG: x / pH: x  Gluc: 95 mg/dL / Ketone: x  / Bili: x / Urobili: x   Blood: x / Protein: x / Nitrite: x   Leuk Esterase: x / RBC: x / WBC x   Sq Epi: x / Non Sq Epi: x / Bacteria: x      
Patient is a 68y old  Male who presents with a chief complaint of Weakness/Fatigue (26 Dec 2023 12:18)      INTERVAL HPI/OVERNIGHT EVENTS: Patient seen and examined at bedside. No overnight events. Tolerating diet. Denies fever, chills, chest pain, shortness of breath, abdominal pain, nausea/vomiting, headache.    MEDICATIONS  (STANDING):  aspirin  chewable 81 milliGRAM(s) Oral daily  atorvastatin 20 milliGRAM(s) Oral at bedtime  carbidopa/levodopa  25/250 0.5 Tablet(s) Oral four times a day  levothyroxine 125 MICROGram(s) Oral daily  magnesium oxide 400 milliGRAM(s) Oral three times a day with meals  metoprolol succinate ER 50 milliGRAM(s) Oral daily  sodium chloride 0.9%. 1000 milliLiter(s) (75 mL/Hr) IV Continuous <Continuous>  tamsulosin 0.8 milliGRAM(s) Oral at bedtime    MEDICATIONS  (PRN):  acetaminophen     Tablet .. 650 milliGRAM(s) Oral every 6 hours PRN Temp greater or equal to 38C (100.4F), Mild Pain (1 - 3)  diphenoxylate/atropine 1 Tablet(s) Oral three times a day PRN for diarrhea  HYDROmorphone  Injectable 0.5 milliGRAM(s) IV Push four times a day PRN Severe Pain (7 - 10)  HYDROmorphone  Injectable 0.2 milliGRAM(s) IV Push four times a day PRN Moderate Pain (4 - 6)  melatonin 3 milliGRAM(s) Oral at bedtime PRN Insomnia  ondansetron Injectable 4 milliGRAM(s) IV Push every 8 hours PRN Nausea and/or Vomiting      Allergies    No Known Allergies    Intolerances        REVIEW OF SYSTEMS:  CONSTITUTIONAL: No fever or chills  HEENT:  No headache, no sore throat  RESPIRATORY: No cough, wheezing, or shortness of breath  CARDIOVASCULAR: No chest pain, palpitations  GASTROINTESTINAL: No abd pain, nausea, vomiting, or diarrhea  GENITOURINARY: No dysuria, frequency, or hematuria  NEUROLOGICAL: no focal weakness or dizziness  MUSCULOSKELETAL: no myalgias     Vital Signs Last 24 Hrs  T(C): 36.3 (26 Dec 2023 12:40), Max: 36.8 (26 Dec 2023 05:10)  T(F): 97.4 (26 Dec 2023 12:40), Max: 98.2 (26 Dec 2023 05:10)  HR: 89 (26 Dec 2023 12:40) (74 - 89)  BP: 107/55 (26 Dec 2023 12:40) (104/53 - 115/55)  BP(mean): --  RR: 17 (26 Dec 2023 12:40) (16 - 18)  SpO2: 94% (26 Dec 2023 12:40) (93% - 95%)    Parameters below as of 26 Dec 2023 12:40  Patient On (Oxygen Delivery Method): nasal cannula        PHYSICAL EXAM:  GENERAL: NAD  HEENT:  anicteric, moist mucous membranes  CHEST/LUNG:  CTA b/l, no rales, wheezes, or rhonchi  HEART:  RRR, S1, S2  ABDOMEN:  BS+, soft, nontender, nondistended  EXTREMITIES: no edema, cyanosis, or calf tenderness  NERVOUS SYSTEM: answers questions and follows commands appropriately    LABS:                        8.6    5.75  )-----------( 310      ( 26 Dec 2023 07:40 )             27.7     CBC Full  -  ( 26 Dec 2023 07:40 )  WBC Count : 5.75 K/uL  Hemoglobin : 8.6 g/dL  Hematocrit : 27.7 %  Platelet Count - Automated : 310 K/uL  Mean Cell Volume : 77.6 fl  Mean Cell Hemoglobin : 24.1 pg  Mean Cell Hemoglobin Concentration : 31.0 gm/dL  Auto Neutrophil # : 4.06 K/uL  Auto Lymphocyte # : 0.71 K/uL  Auto Monocyte # : 0.88 K/uL  Auto Eosinophil # : 0.01 K/uL  Auto Basophil # : 0.04 K/uL  Auto Neutrophil % : 70.6 %  Auto Lymphocyte % : 12.3 %  Auto Monocyte % : 15.3 %  Auto Eosinophil % : 0.2 %  Auto Basophil % : 0.7 %    26 Dec 2023 07:40    140    |  110    |  15     ----------------------------<  85     3.2     |  22     |  0.95     Ca    7.9        26 Dec 2023 07:40    TPro  5.9    /  Alb  2.0    /  TBili  0.8    /  DBili  x      /  AST  36     /  ALT  24     /  AlkPhos  90     26 Dec 2023 07:40    PT/INR - ( 26 Dec 2023 07:40 )   PT: 14.7 sec;   INR: 1.27 ratio         PTT - ( 24 Dec 2023 20:20 )  PTT:24.4 sec  Urinalysis Basic - ( 26 Dec 2023 07:40 )    Color: x / Appearance: x / SG: x / pH: x  Gluc: 85 mg/dL / Ketone: x  / Bili: x / Urobili: x   Blood: x / Protein: x / Nitrite: x   Leuk Esterase: x / RBC: x / WBC x   Sq Epi: x / Non Sq Epi: x / Bacteria: x      CAPILLARY BLOOD GLUCOSE              RADIOLOGY & ADDITIONAL TESTS:    Personally reviewed.     Consultant(s) Notes Reviewed:  [x] YES  [ ] NO    
Patient is a 68y old  Male who presents with a chief complaint of Weakness/Fatigue (26 Dec 2023 12:18)      INTERVAL HPI/OVERNIGHT EVENTS: Patient seen and examined at bedside. No overnight events. Tolerating diet. Denies fever, chills, chest pain, shortness of breath, abdominal pain, nausea/vomiting, headache.    MEDICATIONS  (STANDING):  aspirin  chewable 81 milliGRAM(s) Oral daily  atorvastatin 20 milliGRAM(s) Oral at bedtime  carbidopa/levodopa  25/250 0.5 Tablet(s) Oral four times a day  levothyroxine 125 MICROGram(s) Oral daily  magnesium oxide 400 milliGRAM(s) Oral three times a day with meals  metoprolol succinate ER 50 milliGRAM(s) Oral daily  sodium chloride 0.9%. 1000 milliLiter(s) (75 mL/Hr) IV Continuous <Continuous>  tamsulosin 0.8 milliGRAM(s) Oral at bedtime    MEDICATIONS  (PRN):  acetaminophen     Tablet .. 650 milliGRAM(s) Oral every 6 hours PRN Temp greater or equal to 38C (100.4F), Mild Pain (1 - 3)  diphenoxylate/atropine 1 Tablet(s) Oral three times a day PRN for diarrhea  HYDROmorphone  Injectable 0.5 milliGRAM(s) IV Push four times a day PRN Severe Pain (7 - 10)  HYDROmorphone  Injectable 0.2 milliGRAM(s) IV Push four times a day PRN Moderate Pain (4 - 6)  melatonin 3 milliGRAM(s) Oral at bedtime PRN Insomnia  ondansetron Injectable 4 milliGRAM(s) IV Push every 8 hours PRN Nausea and/or Vomiting      Allergies    No Known Allergies    Intolerances        REVIEW OF SYSTEMS:  CONSTITUTIONAL: No fever or chills  HEENT:  No headache, no sore throat  RESPIRATORY: No cough, wheezing, or shortness of breath  CARDIOVASCULAR: No chest pain, palpitations  GASTROINTESTINAL: No abd pain, nausea, vomiting, or diarrhea  GENITOURINARY: No dysuria, frequency, or hematuria  NEUROLOGICAL: no focal weakness or dizziness  MUSCULOSKELETAL: no myalgias     Vital Signs Last 24 Hrs  T(C): 36.3 (26 Dec 2023 12:40), Max: 36.8 (26 Dec 2023 05:10)  T(F): 97.4 (26 Dec 2023 12:40), Max: 98.2 (26 Dec 2023 05:10)  HR: 89 (26 Dec 2023 12:40) (74 - 89)  BP: 107/55 (26 Dec 2023 12:40) (104/53 - 115/55)  BP(mean): --  RR: 17 (26 Dec 2023 12:40) (16 - 18)  SpO2: 94% (26 Dec 2023 12:40) (93% - 95%)    Parameters below as of 26 Dec 2023 12:40  Patient On (Oxygen Delivery Method): nasal cannula        PHYSICAL EXAM:  GENERAL: NAD  HEENT:  anicteric, moist mucous membranes  CHEST/LUNG:  CTA b/l, no rales, wheezes, or rhonchi  HEART:  RRR, S1, S2  ABDOMEN:  BS+, soft, nontender, nondistended  EXTREMITIES: no edema, cyanosis, or calf tenderness  NERVOUS SYSTEM: answers questions and follows commands appropriately    LABS:                        8.6    5.75  )-----------( 310      ( 26 Dec 2023 07:40 )             27.7     CBC Full  -  ( 26 Dec 2023 07:40 )  WBC Count : 5.75 K/uL  Hemoglobin : 8.6 g/dL  Hematocrit : 27.7 %  Platelet Count - Automated : 310 K/uL  Mean Cell Volume : 77.6 fl  Mean Cell Hemoglobin : 24.1 pg  Mean Cell Hemoglobin Concentration : 31.0 gm/dL  Auto Neutrophil # : 4.06 K/uL  Auto Lymphocyte # : 0.71 K/uL  Auto Monocyte # : 0.88 K/uL  Auto Eosinophil # : 0.01 K/uL  Auto Basophil # : 0.04 K/uL  Auto Neutrophil % : 70.6 %  Auto Lymphocyte % : 12.3 %  Auto Monocyte % : 15.3 %  Auto Eosinophil % : 0.2 %  Auto Basophil % : 0.7 %    26 Dec 2023 07:40    140    |  110    |  15     ----------------------------<  85     3.2     |  22     |  0.95     Ca    7.9        26 Dec 2023 07:40    TPro  5.9    /  Alb  2.0    /  TBili  0.8    /  DBili  x      /  AST  36     /  ALT  24     /  AlkPhos  90     26 Dec 2023 07:40    PT/INR - ( 26 Dec 2023 07:40 )   PT: 14.7 sec;   INR: 1.27 ratio         PTT - ( 24 Dec 2023 20:20 )  PTT:24.4 sec  Urinalysis Basic - ( 26 Dec 2023 07:40 )    Color: x / Appearance: x / SG: x / pH: x  Gluc: 85 mg/dL / Ketone: x  / Bili: x / Urobili: x   Blood: x / Protein: x / Nitrite: x   Leuk Esterase: x / RBC: x / WBC x   Sq Epi: x / Non Sq Epi: x / Bacteria: x      CAPILLARY BLOOD GLUCOSE              RADIOLOGY & ADDITIONAL TESTS:    Personally reviewed.     Consultant(s) Notes Reviewed:  [x] YES  [ ] NO

## 2023-12-27 NOTE — PROGRESS NOTE ADULT - PROBLEM SELECTOR PLAN 5
2/2 chronic disease / malignancy  - trend CBC, f/u iron studies  - Type and screen  - transfusion goal >7
2/2 chronic disease / malignancy  - trend CBC, f/u iron studies  - Type and screen  - transfusion goal >7

## 2023-12-27 NOTE — DIETITIAN INITIAL EVALUATION ADULT - NSFNSPHYEXAMSKINFT_GEN_A_CORE
Pressure Injury 1: Right:, buttocks, Stage II  Pressure Injury 2: none, none  Pressure Injury 3: none, none  Pressure Injury 4: none, none  Pressure Injury 5: none, none  Pressure Injury 6: none, none  Pressure Injury 7: none, none  Pressure Injury 8: none, none  Pressure Injury 9: none, none  Pressure Injury 10: none, none  Pressure Injury 11: none, none

## 2023-12-27 NOTE — DIETITIAN INITIAL EVALUATION ADULT - OTHER INFO
69 y/o male adm with ARF. PMH hypernephroma, medullary thyroid carcinoma. Pt visited at bedside this morning. Pt sitting up in chair. Pt tolerating diet well, no complaints. Pt states that his appetite has improved since admission. Eats well normally. Prior to adm, pt's po intake noted to be decreased.

## 2023-12-27 NOTE — PROGRESS NOTE ADULT - PROBLEM SELECTOR PLAN 1
Patient s/p d/c from [facility] 12/24, admitted to [place] for weakness, fatigue  - maintenance IVF for BP  - increase oral intake as tolerated, Nutrition consult  - Pending OG placement. Patient to follow up with [facility] to discuss schedule of next cycle.   - Heme/onc (Dr. Modi) consulted, recs appreciated: Hold next cycle of treatment, will need much improved performance status, may need dose adjustments Patient s/p d/c from [facility] 12/24, admitted to [facility] for weakness, fatigue  - maintenance IVF for BP  - increase oral intake as tolerated, Nutrition consult  - Pending OG placement. Patient to follow up with [facility] to discuss schedule of next cycle.   - Heme/onc (Dr. Modi) consulted, recs appreciated: Hold next cycle of treatment, will need much improved performance status, may need dose adjustments

## 2023-12-27 NOTE — DIETITIAN INITIAL EVALUATION ADULT - PROBLEM SELECTOR PLAN 3
- level 3 LN biopsy confirmed s/p partial thyroidectomy and left modified neck  dissection with Dr. Della Colby on 10/17/2023- 2/26 LN metastatic papillary  - pathology revealing mixed type with medullary component - sporadic  - on levothyroxine 125mcg (home med)  - f/u TSH, Free T4, calcitonin  - [facility] [place]. Penny Dailey follow up after discharge - level 3 LN biopsy confirmed s/p partial thyroidectomy and left modified neck  dissection with Dr. Della Colby on 10/17/2023- 2/26 LN metastatic papillary  - pathology revealing mixed type with medullary component - sporadic  - on levothyroxine 125mcg (home med)  - f/u TSH, Free T4, calcitonin  - [facility] [place] Dr. Penny Dailey follow up after discharge

## 2023-12-27 NOTE — PROGRESS NOTE ADULT - ASSESSMENT
MANISHA: Prerenal azotemia  h/o Urothelial Ca  Anemia    Improved and stable renal indices. To continue current meds. Monitor h/h trend. Transfuse PRBC's PRN.   D/w patients family at bedside. Will follow electrolytes and renal function trend. D/c planning. 
68M hx urothelial cancer (on Enfortumab Vedotin and Pembrolizumab last treatment 12/13, s/p L ureteral stent s/p exchange, mets to lung), R kidney stones (s/p R ureteroscopy w/ stone removal 8/2023), BPH, parkinson disease (s/p DBS), hypothyroidism (thyroid cancer s/p partial thyroidectomy), presented to ED for weakness and fatigue. 
68M hx urothelial cancer (on Enfortumab Vedotin and Pembrolizumab last treatment 12/13, s/p L ureteral stent s/p exchange, mets to lung), R kidney stones (s/p R ureteroscopy w/ stone removal 8/2023), BPH, parkinson disease (s/p DBS), hypothyroidism (thyroid cancer s/p partial thyroidectomy), presented to ED for weakness and fatigue.

## 2023-12-27 NOTE — DIETITIAN INITIAL EVALUATION ADULT - PROBLEM SELECTOR PLAN 4
Baseline SCr 1.2  L renal mass a/w hydronephrosis, R renal mass non-obstructive  - renal sono at [facility] [place] 12/22 w/o L hydro, mass increased in size from 10x8 on CT  9/6/23 to 21.5cm  - Maintenance IVF in ED  - Urology consulted at [facility] 12/22, no acute urologic intervention - hydro 2/2 tumor burden in  non-functioning kidney, believed not to be contributing to MANISHA  - trend creatinine  - nephrology  - cont home tamsulosin. Baseline SCr 1.2  L renal mass a/w hydronephrosis, R renal mass non-obstructive  - renal sono at [facility] [place] 12/22 w/o L hydro, mass increased in size from 10x8 on CT  9/6/23 to 21.5cm  - Maintenance IVF in ED  - Urology consulted at [facility] 12/22, no acute urologic intervention - hydro 2/2 tumor burden in  non-functioning kidney, believed not to be contributing to MANISHA  - trend creatinine  - nephrology  - cont home tamsulosin.

## 2023-12-28 ENCOUNTER — TRANSCRIPTION ENCOUNTER (OUTPATIENT)
Age: 68
End: 2023-12-28

## 2023-12-28 VITALS
TEMPERATURE: 98 F | RESPIRATION RATE: 18 BRPM | OXYGEN SATURATION: 95 % | SYSTOLIC BLOOD PRESSURE: 115 MMHG | HEART RATE: 89 BPM | DIASTOLIC BLOOD PRESSURE: 66 MMHG

## 2023-12-28 LAB
ANION GAP SERPL CALC-SCNC: 7 MMOL/L — SIGNIFICANT CHANGE UP (ref 5–17)
ANION GAP SERPL CALC-SCNC: 7 MMOL/L — SIGNIFICANT CHANGE UP (ref 5–17)
BASOPHILS # BLD AUTO: 0.05 K/UL — SIGNIFICANT CHANGE UP (ref 0–0.2)
BASOPHILS # BLD AUTO: 0.05 K/UL — SIGNIFICANT CHANGE UP (ref 0–0.2)
BASOPHILS NFR BLD AUTO: 1.2 % — SIGNIFICANT CHANGE UP (ref 0–2)
BASOPHILS NFR BLD AUTO: 1.2 % — SIGNIFICANT CHANGE UP (ref 0–2)
BUN SERPL-MCNC: 10 MG/DL — SIGNIFICANT CHANGE UP (ref 7–23)
BUN SERPL-MCNC: 10 MG/DL — SIGNIFICANT CHANGE UP (ref 7–23)
CALCIUM SERPL-MCNC: 7.4 MG/DL — LOW (ref 8.5–10.1)
CALCIUM SERPL-MCNC: 7.4 MG/DL — LOW (ref 8.5–10.1)
CHLORIDE SERPL-SCNC: 110 MMOL/L — HIGH (ref 96–108)
CHLORIDE SERPL-SCNC: 110 MMOL/L — HIGH (ref 96–108)
CO2 SERPL-SCNC: 23 MMOL/L — SIGNIFICANT CHANGE UP (ref 22–31)
CO2 SERPL-SCNC: 23 MMOL/L — SIGNIFICANT CHANGE UP (ref 22–31)
CREAT SERPL-MCNC: 0.76 MG/DL — SIGNIFICANT CHANGE UP (ref 0.5–1.3)
CREAT SERPL-MCNC: 0.76 MG/DL — SIGNIFICANT CHANGE UP (ref 0.5–1.3)
CULTURE RESULTS: SIGNIFICANT CHANGE UP
EGFR: 99 ML/MIN/1.73M2 — SIGNIFICANT CHANGE UP
EGFR: 99 ML/MIN/1.73M2 — SIGNIFICANT CHANGE UP
EOSINOPHIL # BLD AUTO: 0 K/UL — SIGNIFICANT CHANGE UP (ref 0–0.5)
EOSINOPHIL # BLD AUTO: 0 K/UL — SIGNIFICANT CHANGE UP (ref 0–0.5)
EOSINOPHIL NFR BLD AUTO: 0 % — SIGNIFICANT CHANGE UP (ref 0–6)
EOSINOPHIL NFR BLD AUTO: 0 % — SIGNIFICANT CHANGE UP (ref 0–6)
GLUCOSE SERPL-MCNC: 88 MG/DL — SIGNIFICANT CHANGE UP (ref 70–99)
GLUCOSE SERPL-MCNC: 88 MG/DL — SIGNIFICANT CHANGE UP (ref 70–99)
HCT VFR BLD CALC: 27.4 % — LOW (ref 39–50)
HCT VFR BLD CALC: 27.4 % — LOW (ref 39–50)
HGB BLD-MCNC: 8.6 G/DL — LOW (ref 13–17)
HGB BLD-MCNC: 8.6 G/DL — LOW (ref 13–17)
IMM GRANULOCYTES NFR BLD AUTO: 0.9 % — SIGNIFICANT CHANGE UP (ref 0–0.9)
IMM GRANULOCYTES NFR BLD AUTO: 0.9 % — SIGNIFICANT CHANGE UP (ref 0–0.9)
LYMPHOCYTES # BLD AUTO: 0.8 K/UL — LOW (ref 1–3.3)
LYMPHOCYTES # BLD AUTO: 0.8 K/UL — LOW (ref 1–3.3)
LYMPHOCYTES # BLD AUTO: 18.5 % — SIGNIFICANT CHANGE UP (ref 13–44)
LYMPHOCYTES # BLD AUTO: 18.5 % — SIGNIFICANT CHANGE UP (ref 13–44)
MCHC RBC-ENTMCNC: 24.2 PG — LOW (ref 27–34)
MCHC RBC-ENTMCNC: 24.2 PG — LOW (ref 27–34)
MCHC RBC-ENTMCNC: 31.4 GM/DL — LOW (ref 32–36)
MCHC RBC-ENTMCNC: 31.4 GM/DL — LOW (ref 32–36)
MCV RBC AUTO: 77.2 FL — LOW (ref 80–100)
MCV RBC AUTO: 77.2 FL — LOW (ref 80–100)
MONOCYTES # BLD AUTO: 0.93 K/UL — HIGH (ref 0–0.9)
MONOCYTES # BLD AUTO: 0.93 K/UL — HIGH (ref 0–0.9)
MONOCYTES NFR BLD AUTO: 21.5 % — HIGH (ref 2–14)
MONOCYTES NFR BLD AUTO: 21.5 % — HIGH (ref 2–14)
NEUTROPHILS # BLD AUTO: 2.5 K/UL — SIGNIFICANT CHANGE UP (ref 1.8–7.4)
NEUTROPHILS # BLD AUTO: 2.5 K/UL — SIGNIFICANT CHANGE UP (ref 1.8–7.4)
NEUTROPHILS NFR BLD AUTO: 57.9 % — SIGNIFICANT CHANGE UP (ref 43–77)
NEUTROPHILS NFR BLD AUTO: 57.9 % — SIGNIFICANT CHANGE UP (ref 43–77)
NRBC # BLD: 0 /100 WBCS — SIGNIFICANT CHANGE UP (ref 0–0)
NRBC # BLD: 0 /100 WBCS — SIGNIFICANT CHANGE UP (ref 0–0)
PLATELET # BLD AUTO: 373 K/UL — SIGNIFICANT CHANGE UP (ref 150–400)
PLATELET # BLD AUTO: 373 K/UL — SIGNIFICANT CHANGE UP (ref 150–400)
POTASSIUM SERPL-MCNC: 3 MMOL/L — LOW (ref 3.5–5.3)
POTASSIUM SERPL-MCNC: 3 MMOL/L — LOW (ref 3.5–5.3)
POTASSIUM SERPL-SCNC: 3 MMOL/L — LOW (ref 3.5–5.3)
POTASSIUM SERPL-SCNC: 3 MMOL/L — LOW (ref 3.5–5.3)
RBC # BLD: 3.55 M/UL — LOW (ref 4.2–5.8)
RBC # BLD: 3.55 M/UL — LOW (ref 4.2–5.8)
RBC # FLD: 21.8 % — HIGH (ref 10.3–14.5)
RBC # FLD: 21.8 % — HIGH (ref 10.3–14.5)
SODIUM SERPL-SCNC: 140 MMOL/L — SIGNIFICANT CHANGE UP (ref 135–145)
SODIUM SERPL-SCNC: 140 MMOL/L — SIGNIFICANT CHANGE UP (ref 135–145)
SPECIMEN SOURCE: SIGNIFICANT CHANGE UP
WBC # BLD: 4.32 K/UL — SIGNIFICANT CHANGE UP (ref 3.8–10.5)
WBC # BLD: 4.32 K/UL — SIGNIFICANT CHANGE UP (ref 3.8–10.5)
WBC # FLD AUTO: 4.32 K/UL — SIGNIFICANT CHANGE UP (ref 3.8–10.5)
WBC # FLD AUTO: 4.32 K/UL — SIGNIFICANT CHANGE UP (ref 3.8–10.5)

## 2023-12-28 PROCEDURE — 86803 HEPATITIS C AB TEST: CPT

## 2023-12-28 PROCEDURE — 94640 AIRWAY INHALATION TREATMENT: CPT

## 2023-12-28 PROCEDURE — 83540 ASSAY OF IRON: CPT

## 2023-12-28 PROCEDURE — 96375 TX/PRO/DX INJ NEW DRUG ADDON: CPT

## 2023-12-28 PROCEDURE — 83036 HEMOGLOBIN GLYCOSYLATED A1C: CPT

## 2023-12-28 PROCEDURE — 85610 PROTHROMBIN TIME: CPT

## 2023-12-28 PROCEDURE — 85025 COMPLETE CBC W/AUTO DIFF WBC: CPT

## 2023-12-28 PROCEDURE — 80061 LIPID PANEL: CPT

## 2023-12-28 PROCEDURE — 96365 THER/PROPH/DIAG IV INF INIT: CPT

## 2023-12-28 PROCEDURE — 97166 OT EVAL MOD COMPLEX 45 MIN: CPT

## 2023-12-28 PROCEDURE — 82728 ASSAY OF FERRITIN: CPT

## 2023-12-28 PROCEDURE — 80048 BASIC METABOLIC PNL TOTAL CA: CPT

## 2023-12-28 PROCEDURE — 80053 COMPREHEN METABOLIC PANEL: CPT

## 2023-12-28 PROCEDURE — 97110 THERAPEUTIC EXERCISES: CPT

## 2023-12-28 PROCEDURE — 97112 NEUROMUSCULAR REEDUCATION: CPT

## 2023-12-28 PROCEDURE — 81001 URINALYSIS AUTO W/SCOPE: CPT

## 2023-12-28 PROCEDURE — 97162 PT EVAL MOD COMPLEX 30 MIN: CPT

## 2023-12-28 PROCEDURE — 83735 ASSAY OF MAGNESIUM: CPT

## 2023-12-28 PROCEDURE — 99285 EMERGENCY DEPT VISIT HI MDM: CPT | Mod: 25

## 2023-12-28 PROCEDURE — 36415 COLL VENOUS BLD VENIPUNCTURE: CPT

## 2023-12-28 PROCEDURE — 82308 ASSAY OF CALCITONIN: CPT

## 2023-12-28 PROCEDURE — 85027 COMPLETE CBC AUTOMATED: CPT

## 2023-12-28 PROCEDURE — 84443 ASSAY THYROID STIM HORMONE: CPT

## 2023-12-28 PROCEDURE — 99239 HOSP IP/OBS DSCHRG MGMT >30: CPT

## 2023-12-28 PROCEDURE — 97116 GAIT TRAINING THERAPY: CPT

## 2023-12-28 PROCEDURE — 83550 IRON BINDING TEST: CPT

## 2023-12-28 PROCEDURE — 84436 ASSAY OF TOTAL THYROXINE: CPT

## 2023-12-28 PROCEDURE — 93005 ELECTROCARDIOGRAM TRACING: CPT

## 2023-12-28 PROCEDURE — 85730 THROMBOPLASTIN TIME PARTIAL: CPT

## 2023-12-28 RX ORDER — POTASSIUM CHLORIDE 20 MEQ
40 PACKET (EA) ORAL EVERY 4 HOURS
Refills: 0 | Status: DISCONTINUED | OUTPATIENT
Start: 2023-12-28 | End: 2023-12-28

## 2023-12-28 RX ORDER — IPRATROPIUM/ALBUTEROL SULFATE 18-103MCG
3 AEROSOL WITH ADAPTER (GRAM) INHALATION ONCE
Refills: 0 | Status: COMPLETED | OUTPATIENT
Start: 2023-12-28 | End: 2023-12-28

## 2023-12-28 RX ADMIN — MAGNESIUM OXIDE 400 MG ORAL TABLET 400 MILLIGRAM(S): 241.3 TABLET ORAL at 08:15

## 2023-12-28 RX ADMIN — Medication 50 MILLIGRAM(S): at 05:20

## 2023-12-28 RX ADMIN — Medication 500 MILLIGRAM(S): at 05:20

## 2023-12-28 RX ADMIN — Medication 3 MILLILITER(S): at 09:25

## 2023-12-28 RX ADMIN — Medication 40 MILLIEQUIVALENT(S): at 09:10

## 2023-12-28 RX ADMIN — Medication 150 MICROGRAM(S): at 05:19

## 2023-12-28 RX ADMIN — CARBIDOPA AND LEVODOPA 0.5 TABLET(S): 25; 100 TABLET ORAL at 06:17

## 2023-12-28 RX ADMIN — CARBIDOPA AND LEVODOPA 0.5 TABLET(S): 25; 100 TABLET ORAL at 11:53

## 2023-12-28 RX ADMIN — MAGNESIUM OXIDE 400 MG ORAL TABLET 400 MILLIGRAM(S): 241.3 TABLET ORAL at 11:53

## 2023-12-28 RX ADMIN — Medication 3 MILLILITER(S): at 05:42

## 2023-12-28 RX ADMIN — HEPARIN SODIUM 5000 UNIT(S): 5000 INJECTION INTRAVENOUS; SUBCUTANEOUS at 05:20

## 2023-12-28 RX ADMIN — Medication 100 UNIT(S): at 12:45

## 2023-12-28 RX ADMIN — Medication 1 TABLET(S): at 12:14

## 2023-12-28 NOTE — DISCHARGE NOTE NURSING/CASE MANAGEMENT/SOCIAL WORK - NSDCPEFALRISK_GEN_ALL_CORE
For information on Fall & Injury Prevention, visit: [email or web address] OR  [email or web address] OR  https://www.cdc.gov/steadi/patient.html For information on Fall & Injury Prevention, visit: [email or web address] OR  [email or web address] OR  https://www.cdc.gov/steadi/patient.html

## 2023-12-28 NOTE — CHART NOTE - NSCHARTNOTEFT_GEN_A_CORE
Called by RN for pt complaining of wheezing. Pt currently on 3L NC and satting ~92%. Pt denies SOB, chest pain, palpitations.     Plan:  -Ordered duonebs x1  - CXR ordered, f/u results

## 2023-12-28 NOTE — DISCHARGE NOTE NURSING/CASE MANAGEMENT/SOCIAL WORK - PATIENT PORTAL LINK FT
You can access the FollowMyHealth Patient Portal offered by [facility] by registering at the following website: [email or web address]. By joining [employer]’s FollowMyHealth portal, you will also be able to view your health information using other applications (apps) compatible with our system. You can access the FollowMyHealth Patient Portal offered by [facility] by registering at the following website: [email or web address]. By joining [employer]’s FollowMyHealth portal, you will also be able to view your health information using other applications (apps) compatible with our system.

## 2023-12-28 NOTE — PATIENT CHOICE NOTE. - NSPTCHOICESTATE_GEN_ALL_CORE
I have met with the patient and/or caregiver to discuss discharge goals and treatment plan. Patient and/or caregiver also provided with instructions on accessing the CMS Compare websites for additional information related to Post Acute Provider quality and resource use measures to assist them in evaluation of the providers and in selecting their post-acute provider of choice. Patient and caregiver were informed of the facilities that are owned and/or operated by [facility]. I have discussed with the patient the availability of in-network facilities and providers. Patient and caregiver provided with a list of post-acute providers whose services are appropriate to the discharge plans and patient needs.     For patient requiring durable medical equipment, patient and/or caregiver were informed that they have the right to request who provides the required equipment. I have met with the patient and/or caregiver to discuss discharge goals and treatment plan. Patient and/or caregiver also provided with instructions on accessing the CMS Compare websites for additional information related to Post Acute Provider quality and resource use measures to assist them in evaluation of the providers and in selecting their post-acute provider of choice. Patient and caregiver were informed of the facilities that are owned and/or operated by [facility]. I have discussed with the patient the availability of in-network facilities and providers. Patient and caregiver provided with a list of post-acute providers whose services are appropriate to the discharge plans and patient needs.     For patient requiring durable medical equipment, patient and/or caregiver were informed that they have the right to request who provides the required equipment.

## 2023-12-28 NOTE — DISCHARGE NOTE PROVIDER - HOSPITAL COURSE
ADMISSION H+P:    HPI:  68M hx urothelial cancer (on Enfortumab Vedotin and Pembrolizumab last treatment 12/13, s/p L ureteral stent s/p exchange, mets to lung), R kidney stones (s/p R ureteroscopy w/ stone removal 8/2023), BPH, parkinson disease (s/p DBS), hypothyroidism (thyroid cancer s/p partial thyroidectomy), presented to ED for weakness and fatigue. Patient states that yesterday he was d/c from [facility] with home PT. Patient attempted to participate in home PT was uncomfortable with their walker. Patient states that later he has two episodes of his legs giving out from underneath him when trying to use their walker. He states he didn't fall, but slowly sat down on the ground and his family wasn't able to help him up so they called the ambulance. Patient's family would like evaluation for OG placement. He states he has also had decreased PO intake and has trouble with hard foods. Patient currently on 3 L NC, but states he just got it to make him more comfortable although was doing fine without it before.     Denies fever, chills, chest pain, palpitations, SOB, cough, abdominal pain, nausea, vomiting, constipation, urinary frequency, urgency, or dysuria, headaches, changes in vision, dizziness, numbness, tingling.      ED Course:   Vitals: BP: 115/58, HR: 75, Temp: 97.3, RR: 17, SpO2: 94% on 3 L NC  Labs: HGB: 8.4 Creatine: 1.60 EGFR: 47  UA: Urine RBC: 25, Urine Blood: Large  CXR: as per personal read, official read pending   EKG: Normal sinus rhythm  Received in the ED:  Dilaudid, Sinemet, IV 1L Bolus   (25 Dec 2023 12:04)      ---  HOSPITAL COURSE: Patient admitted with weakness and fatigue. PT consulted and recommended discharge to [facility]. Discussed with heme/onc (Dr. Modi), who advised to hold next cycle of treatment, will need much improved performance status, may need dose adjustments. Wife spoke with patient's oncologist, Dr. Regis Lazcano, who recommended to also hold next cycle of chemo.     Patient was medically optimized and improved clinically throughout hospital course. Patient seen and examined on day of discharge.    Vital Signs  T(C): 36.6 (28 Dec 2023 04:34), Max: 36.8 (27 Dec 2023 19:55)  T(F): 97.9 (28 Dec 2023 04:34), Max: 98.3 (27 Dec 2023 19:55)  HR: 84 (28 Dec 2023 04:34) (83 - 84)  BP: 125/64 (28 Dec 2023 04:34) (106/58 - 125/64)  RR: 18 (28 Dec 2023 04:34) (18 - 18)  SpO2: 92% (28 Dec 2023 04:34) (92% - 94%)    Physical Exam:  General: well-developed, well-nourished, NAD  HEENT: normocephalic, atraumatic, EOMI, moist mucous membranes   Neck: supple, non-tender, no masses  Neurology: AAOx3, sensation intact  Respiratory: clear to auscultation bilaterally; no wheezes, rhonchi, or rales  CV: regular rate and rhythm, soft S1/S2, no murmurs, rubs, or gallops  Abdominal: soft, non-tender, non-distended, bowel sounds present  Extremities: no clubbing, cyanosis, or edema; palpable peripheral pulses  Musculoskeletal: no joint erythema or warmth, no joint swelling   Skin: warm, dry, normal color    Patient is medically stable for discharge to [facility] with outpatient follow up.  ---  CONSULTANTS:   Heme/onc: Dr. Modi    ---  TIME SPENT:   I, the attending physician, was physically present for the key portions of the evaluation and management (E/M) service provided. The total amount of time spent reviewing the hospital course, laboratory values, imaging findings, assessing/counseling the patient, discussing with consultant physicians, social work, nursing staff was 35 minutes.     ---  FINAL DISCHARGE DIAGNOSIS LIST:  Please see last daily progress note for final discharge diagnoses         ADMISSION H+P:    HPI:  68M hx urothelial cancer (on Enfortumab Vedotin and Pembrolizumab last treatment 12/13, s/p L ureteral stent s/p exchange, mets to lung), R kidney stones (s/p R ureteroscopy w/ stone removal 8/2023), BPH, parkinson disease (s/p DBS), hypothyroidism (thyroid cancer s/p partial thyroidectomy), presented to ED for weakness and fatigue. Patient states that yesterday he was d/c from [facility] with home PT. Patient attempted to participate in home PT was uncomfortable with their walker. Patient states that later he has two episodes of his legs giving out from underneath him when trying to use their walker. He states he didn't fall, but slowly sat down on the ground and his family wasn't able to help him up so they called the ambulance. Patient's family would like evaluation for OG placement. He states he has also had decreased PO intake and has trouble with hard foods. Patient currently on 3 L NC, but states he just got it to make him more comfortable although was doing fine without it before.     Denies fever, chills, chest pain, palpitations, SOB, cough, abdominal pain, nausea, vomiting, constipation, urinary frequency, urgency, or dysuria, headaches, changes in vision, dizziness, numbness, tingling.      ED Course:   Vitals: BP: 115/58, HR: 75, Temp: 97.3, RR: 17, SpO2: 94% on 3 L NC  Labs: HGB: 8.4 Creatine: 1.60 EGFR: 47  UA: Urine RBC: 25, Urine Blood: Large  CXR: as per personal read, official read pending   EKG: Normal sinus rhythm  Received in the ED:  Dilaudid, Sinemet, IV 1L Bolus   (25 Dec 2023 12:04)      ---  HOSPITAL COURSE: Patient admitted with weakness and fatigue. PT consulted and recommended discharge to [facility]. Discussed with heme/onc (Dr. Modi), who advised to hold next cycle of treatment, will need much improved performance status, may need dose adjustments. Wife spoke with patient's oncologist, Dr. Regis Lazcano, who recommended to also hold next cycle of chemo.     Patient was medically optimized and improved clinically throughout hospital course. Patient seen and examined on day of discharge.    Vital Signs  T(C): 36.6 (28 Dec 2023 04:34), Max: 36.8 (27 Dec 2023 19:55)  T(F): 97.9 (28 Dec 2023 04:34), Max: 98.3 (27 Dec 2023 19:55)  HR: 84 (28 Dec 2023 04:34) (83 - 84)  BP: 125/64 (28 Dec 2023 04:34) (106/58 - 125/64)  RR: 18 (28 Dec 2023 04:34) (18 - 18)  SpO2: 92% (28 Dec 2023 04:34) (92% - 94%)    Physical Exam:  General: well-developed, well-nourished, NAD  HEENT: normocephalic, atraumatic, EOMI, moist mucous membranes   Neck: supple, non-tender, no masses  Neurology: AAOx3, sensation intact  Respiratory: clear to auscultation bilaterally; no wheezes, rhonchi, or rales  CV: regular rate and rhythm, soft S1/S2, no murmurs, rubs, or gallops  Abdominal: soft, non-tender, non-distended, bowel sounds present  Extremities: no clubbing, cyanosis, or edema; palpable peripheral pulses  Musculoskeletal: no joint erythema or warmth, no joint swelling   Skin: warm, dry, normal color    Patient is medically stable for discharge to [facility] with outpatient follow up.  ---  CONSULTANTS:   Heme/onc: Dr. Modi    ---  TIME SPENT:   I, the attending physician, was physically present for the key portions of the evaluation and management (E/M) service provided. The total amount of time spent reviewing the hospital course, laboratory values, imaging findings, assessing/counseling the patient, discussing with consultant physicians, social work, nursing staff was 35 minutes.     ---  FINAL DISCHARGE DIAGNOSIS LIST:  Please see last daily progress note for final discharge diagnoses

## 2023-12-28 NOTE — DISCHARGE NOTE PROVIDER - CARE PROVIDER_API CALL
MAR REYES  [address] [place] [address]  Phone: [phone]  Follow Up Time:    MAR REYES  [address] [place] [address]  Phone: [phone]  Follow Up Time:

## 2023-12-28 NOTE — DISCHARGE NOTE PROVIDER - NSDCCPCAREPLAN_GEN_ALL_CORE_FT
PRINCIPAL DISCHARGE DIAGNOSIS  Diagnosis: Metastatic urothelial carcinoma  Assessment and Plan of Treatment: Please follow up with your oncologist within 1 week of discharge.

## 2023-12-28 NOTE — DISCHARGE NOTE PROVIDER - NSDCMRMEDTOKEN_GEN_ALL_CORE_FT
acetaminophen 325 mg oral tablet: 2 tab(s) orally every 6 hours As needed Temp greater or equal to 38C (100.4F), Mild Pain (1 - 3)  ALPRAZolam 0.25 mg oral tablet: 1 tab(s) orally once a day (at bedtime) As needed anxiety  ascorbic acid 500 mg oral tablet: 1 tab(s) orally 2 times a day  aspirin 81 mg oral tablet, chewable: 1 tab(s) orally once a day  atorvastatin 20 mg oral tablet: 1 tab(s) orally once a day (at bedtime)  carbidopa-levodopa 25 mg-250 mg oral tablet: 0.5 tab(s) orally 4 times a day  diphenoxylate-atropine 2.5 mg-0.025 mg oral tablet: 1 tab(s) orally 3 times a day as needed for  diarrhea  levothyroxine 150 mcg (0.15 mg) oral tablet: 1 tab(s) orally once a day  magnesium oxide 400 mg oral tablet: 1 tab(s) orally 3 times a day (with meals)  metoprolol succinate 50 mg oral tablet, extended release: 1 tab(s) orally once a day  Multiple Vitamins oral tablet: 1 tab(s) orally once a day  tamsulosin 0.4 mg oral capsule: 2 cap(s) orally once a day (at bedtime)

## 2023-12-28 NOTE — SOCIAL WORK PROGRESS NOTE - NSSWPROGRESSNOTE_GEN_ALL_CORE
Patient to be discharged to [facility]  subacute rehab at 1:00 via [facility]. All in agreement . Copy of chart to follow.  Patient to be discharged to [facility]  subacute rehab at 1:00 via [facility]. All in agreement . Copy of chart to follow.

## 2024-01-27 RX ORDER — CARBIDOPA AND LEVODOPA 25; 100 MG/1; MG/1
0.5 TABLET ORAL
Refills: 0 | DISCHARGE

## 2024-01-27 RX ORDER — DIPHENOXYLATE HCL/ATROPINE 2.5-.025MG
1 TABLET ORAL
Refills: 0 | DISCHARGE

## 2024-01-27 RX ORDER — TAMSULOSIN HYDROCHLORIDE 0.4 MG/1
2 CAPSULE ORAL
Refills: 0 | DISCHARGE

## 2024-01-27 RX ORDER — METOPROLOL TARTRATE 50 MG
1 TABLET ORAL
Refills: 0 | DISCHARGE

## 2024-01-27 RX ORDER — MAGNESIUM OXIDE 400 MG ORAL TABLET 241.3 MG
1 TABLET ORAL
Refills: 0 | DISCHARGE

## 2024-01-27 RX ORDER — ATORVASTATIN CALCIUM 80 MG/1
1 TABLET, FILM COATED ORAL
Refills: 0 | DISCHARGE

## 2024-01-27 RX ORDER — ASPIRIN/CALCIUM CARB/MAGNESIUM 324 MG
1 TABLET ORAL
Refills: 0 | DISCHARGE

## 2024-01-27 RX ORDER — LEVOTHYROXINE SODIUM 125 MCG
1 TABLET ORAL
Refills: 0 | DISCHARGE

## 2024-02-21 NOTE — DISCHARGE NOTE NURSING/CASE MANAGEMENT/SOCIAL WORK - NSCORESITESY/N_GEN_A_CORE_RD
You can access the FollowMyHealth Patient Portal offered by [facility] by registering at the following website: [email or web address]. By joining [facility]’s FollowMyHealth portal, you will also be able to view your health information using other applications (apps) compatible with our system.
Yes

## 2024-02-26 NOTE — CARE COORDINATION ASSESSMENT. - READMITTED REASON YN
[facility]  Post Procedure Instructions    Today you had:  trigger point injections   occipital nerve block   bursa injection  Joint Injection    Medications used:  lidocaine   bupivacaine   kenalog   dexamethasone        Go to the emergency room if you develop any shortness of breath  Monitor the injection sites for signs and symptoms of infection-fever, chills, redness, swelling, warmth, or drainage to areas.  You may have soreness at injection sites for up to 24 hours.  It may take up to 14 days for the steroid medication to start working although you may feel the effect as early as a few days after the procedure.     You may apply ice to the painful areas to help minimize the discomfort of the needle pokes.  Do not apply heat to sites for at least 12 hours.  You may use anti-inflammatory medications or Tylenol for pain control if necessary    Pain Clinic phone number during work hours (Monday through Friday 8 am-4:30 pm) at [phone] or the Provider Line after hours at [phone]:     Pt was dc from [facility]/Yes

## 2024-09-10 NOTE — PRE-OP CHECKLIST - ASSESSMENT, HISTORY & PHYSICAL COMPLETED AND ON MEDICAL RECORD
UA and EKG ordered - both can be completed at appointment.    Recommend cardiac clearance due to recent note from cardiologist on 6/17/24 stating that patient would be due for stress test at next visit.   done

## 2025-04-14 NOTE — ED ADULT NURSE NOTE - PAIN RATING/NUMBER SCALE (0-10): ACTIVITY
[Time Spent: ___ minutes] : I have spent [unfilled] minutes of time on the encounter which excludes teaching and separately reported services.
5 (moderate pain)

## 2025-04-28 NOTE — PROGRESS NOTE ADULT - SUBJECTIVE AND OBJECTIVE BOX
Fabiola-  It looks like you are generally doing okay.  Lets leave your medications the same.  As we discussed with your thyroid I do not think it is time yet to start you on any thyroid medication but we will continue to watch these numbers every 6 months.  Because of your heart murmur and your carotid bruit lets just get an ultrasound of your carotid arteries and an echocardiogram of your heart-I will let you know how those results come out.    I will plan on seeing you back in 6 months   INTERVAL HPI/OVERNIGHT EVENTS:  Patient S&E at bedside. No o/n events. s/p PRBC overnight with improvement in hemoglobin. creatinine much improved. Supplement K. SAHARA planning today.     VITAL SIGNS:  T(F): 97.8 (12-24-23 @ 12:46)  HR: 81 (12-24-23 @ 12:46)  BP: 102/58 (12-24-23 @ 12:46)  RR: 18 (12-24-23 @ 12:46)  SpO2: 95% (12-24-23 @ 12:46)  Wt(kg): --    PHYSICAL EXAM:    Constitutional: NAD  Eyes: EOMI, sclera non-icteric  Neck: supple  Respiratory: CTAB, no wheezes or crackles   Cardiovascular: RRR  Gastrointestinal: soft, NTND, + BS  Extremities: no cyanosis, clubbing or edema   Neurological: awake and alert      MEDICATIONS  (STANDING):  aspirin enteric coated 81 milliGRAM(s) Oral daily  atorvastatin 20 milliGRAM(s) Oral at bedtime  carbidopa/levodopa  25/250 0.5 Tablet(s) Oral four times a day  heparin   Injectable 5000 Unit(s) SubCutaneous every 8 hours  lactated ringers. 1000 milliLiter(s) (100 mL/Hr) IV Continuous <Continuous>  levothyroxine 125 MICROGram(s) Oral daily  magnesium oxide 400 milliGRAM(s) Oral three times a day with meals  mupirocin 2% Ointment 1 Application(s) Topical two times a day  tamsulosin 0.8 milliGRAM(s) Oral at bedtime    MEDICATIONS  (PRN):  acetaminophen     Tablet .. 650 milliGRAM(s) Oral every 6 hours PRN Temp greater or equal to 38C (100.4F), Mild Pain (1 - 3)  aluminum hydroxide/magnesium hydroxide/simethicone Suspension 30 milliLiter(s) Oral every 4 hours PRN Dyspepsia  melatonin 3 milliGRAM(s) Oral at bedtime PRN Insomnia  ondansetron Injectable 4 milliGRAM(s) IV Push every 8 hours PRN Nausea and/or Vomiting  senna 2 Tablet(s) Oral at bedtime PRN Constipation      Allergies    No Known Allergies    Intolerances        LABS:                        8.3    7.51  )-----------( 333      ( 24 Dec 2023 05:32 )             26.2     12-24    136  |  103  |  22  ----------------------------<  88  2.9<LL>   |  18<L>  |  1.65<H>    Ca    7.7<L>      24 Dec 2023 05:32  Phos  3.0     12-24  Mg     1.80     12-24    TPro  6.1  /  Alb  2.6<L>  /  TBili  1.0  /  DBili  x   /  AST  30  /  ALT  9   /  AlkPhos  109  12-24      Urinalysis Basic - ( 24 Dec 2023 05:32 )    Color: x / Appearance: x / SG: x / pH: x  Gluc: 88 mg/dL / Ketone: x  / Bili: x / Urobili: x   Blood: x / Protein: x / Nitrite: x   Leuk Esterase: x / RBC: x / WBC x   Sq Epi: x / Non Sq Epi: x / Bacteria: x        RADIOLOGY & ADDITIONAL TESTS:  Studies reviewed.

## 2025-07-30 NOTE — PHYSICAL THERAPY INITIAL EVALUATION ADULT - NAME OF CLINICIAN
CHRIS/ [employer]    Stating fax for cpap supplies was faxed on 7/14/2025 / I do not see anything in chart and or scanned into media. CJ will re fax / central fax # provided  
Signed order scanned in chart & faxed  
PILAR Cardona

## (undated) DEVICE — GLV 8 PROTEXIS (WHITE)

## (undated) DEVICE — DRAPE COVER SNAP 36X30"

## (undated) DEVICE — TUBING LEVEL ONE NORMOFLO SET

## (undated) DEVICE — GLV 8 PROTEXIS (CREAM) NEU-THERA

## (undated) DEVICE — VENODYNE/SCD SLEEVE CALF MEDIUM

## (undated) DEVICE — TUBING THERMADX UROLOGY

## (undated) DEVICE — WARMING BLANKET UPPER ADULT

## (undated) DEVICE — BAG URINE W METER 2L

## (undated) DEVICE — SOL IRR BAG NS 0.9% 3000ML

## (undated) DEVICE — DRAPE C ARM UNIVERSAL

## (undated) DEVICE — GLV 6.5 PROTEXIS (WHITE)

## (undated) DEVICE — POSITIONER FOAM HEADREST (PINK)

## (undated) DEVICE — PACK CYSTO

## (undated) DEVICE — TUBING RANGER FLUID IRRIGATION SET DISP

## (undated) DEVICE — POSITIONER FOAM EGG CRATE ULNAR 2PCS (PINK)

## (undated) DEVICE — DRAPE DRAINAGE BAG RELAX & GEMINI

## (undated) DEVICE — ADAPTER CHECK FLO 9FR STERILE

## (undated) DEVICE — DRSG CURITY GAUZE SPONGE 4 X 4" 12-PLY

## (undated) DEVICE — SYR LUER LOK 10CC

## (undated) DEVICE — PROTECTOR HEEL / ELBOW FLUFFY

## (undated) DEVICE — SPECIMEN CONTAINER 100ML

## (undated) DEVICE — SOL IRR POUR H2O 1500ML

## (undated) DEVICE — CLAMP BX CUP 3FRX115CM OVL 85CM

## (undated) DEVICE — CANISTER DISPOSABLE THIN WALL 3000CC

## (undated) DEVICE — IRR BULB PATHFINDER + 10"

## (undated) DEVICE — CLAMP ALLIGATOR (SINGLE JAW) 3FR X 115CM

## (undated) DEVICE — POSITIONER STRAP ARMBOARD VELCRO TS-30

## (undated) DEVICE — TUBING SUCTION 20FT

## (undated) DEVICE — BOSTON SCIENTIFC PUMPING SYSTEM SAPS SINGLE ACTION 10CC

## (undated) DEVICE — GOWN TRIMAX LG

## (undated) DEVICE — ACMI SELF-SEALING SEAL UP TO 7FR

## (undated) DEVICE — DRAPE EQUIPMENT BANDED BAG 30 X 30" (SHOWER CAP)

## (undated) DEVICE — GLV 7.5 PROTEXIS (WHITE)

## (undated) DEVICE — GLV 7 PROTEXIS (WHITE)

## (undated) DEVICE — VENODYNE/SCD SLEEVE CALF LARGE

## (undated) DEVICE — SOL IRR BAG H2O 3000ML